# Patient Record
Sex: FEMALE | Race: BLACK OR AFRICAN AMERICAN | Employment: FULL TIME | URBAN - METROPOLITAN AREA
[De-identification: names, ages, dates, MRNs, and addresses within clinical notes are randomized per-mention and may not be internally consistent; named-entity substitution may affect disease eponyms.]

---

## 2018-08-03 ENCOUNTER — OFFICE VISIT (OUTPATIENT)
Dept: CARDIOLOGY CLINIC | Age: 33
End: 2018-08-03

## 2018-08-03 VITALS
BODY MASS INDEX: 43.4 KG/M2 | HEART RATE: 78 BPM | HEIGHT: 69 IN | SYSTOLIC BLOOD PRESSURE: 136 MMHG | WEIGHT: 293 LBS | DIASTOLIC BLOOD PRESSURE: 82 MMHG

## 2018-08-03 DIAGNOSIS — R06.09 DOE (DYSPNEA ON EXERTION): ICD-10-CM

## 2018-08-03 DIAGNOSIS — R00.2 PALPITATIONS: Primary | ICD-10-CM

## 2018-08-03 DIAGNOSIS — E66.9 OBESITY (BMI 30-39.9): ICD-10-CM

## 2018-08-03 DIAGNOSIS — Q24.9 CONGENITAL HEART ANOMALY: ICD-10-CM

## 2018-08-03 PROCEDURE — 99204 OFFICE O/P NEW MOD 45 MIN: CPT | Performed by: INTERNAL MEDICINE

## 2018-08-03 PROCEDURE — 93000 ELECTROCARDIOGRAM COMPLETE: CPT | Performed by: INTERNAL MEDICINE

## 2018-08-03 RX ORDER — LANOLIN ALCOHOL/MO/W.PET/CERES
65 CREAM (GRAM) TOPICAL DAILY
COMMUNITY
Start: 2018-07-08 | End: 2019-10-02 | Stop reason: ALTCHOICE

## 2018-08-03 NOTE — PROGRESS NOTES
[address]   Electrophysiology Consultation   Date: 8/3/2018  Reason for Consultation: atrial fibrillation   Consult Requesting Physician: Brianne Archuleta MD      Chief Complaint   Patient presents with    New Patient    Atrial Fibrillation        HPI: Andrea Starkey is a 35 y.o. female with pmh repaired DORV with LV dysfunction. She underwent interventricular tunnel VSD closure in 1985. 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 She developed atrial tachycardia in 2010 and she underwent successful cardioversion. She is referred today for further evaluation of palpitations. She reports she has intermittent palpitations that make her acutely SOB and dizzy, no arrhythmias have been captured by EKG. She reports the episodes last 5-7 minutes in duration. Patient denies lightheadedness, orthopnea, edema, presyncope or syncope. Past Medical History:   Diagnosis Date    Asthma     Migraines, basilar     Palpitations     VSD (ventricular septal defect and aortic arch hypoplasia         Past Surgical History:   Procedure Laterality Date    VSD REPAIR      baby     Allergies:  No Known Allergies    Social History:   reports that she has never smoked. She has never used smokeless tobacco. She reports that she drinks alcohol. She reports that she does not use drugs. Family History:     Reviewed. Denies family history of sudden cardiac death, arrhythmia, premature CAD    Review of System:  All other systems reviewed and are negative except for that noted above.  Pertinent negatives are:   General: negative for fever, chills   Ophthalmic ROS: negative for - eye pain or loss of vision  ENT ROS: negative for - headaches, sore throat   Respiratory: negative for - cough, sputum  Cardiovascular: Reviewed in HPI  Gastrointestinal: negative for - abdominal pain, diarrhea, N/V  Hematology: negative for - bleeding, blood clots, bruising or and the systolic function is normal.  10. Left ventricle is normal in size and the systolic function is normal.  11. The ascending aorta, transverse arch and descending aorta are unobstructed. 12. There is no significant pericardial effusion. Cath:     Medication:  Current Outpatient Prescriptions   Medication Sig Dispense Refill    ferrous sulfate 325 (65 Fe) MG EC tablet Take 65 mg by mouth daily      aspirin 81 MG tablet Take 81 mg by mouth daily      albuterol (PROVENTIL) (2.5 MG/3ML) 0.083% nebulizer solution Take 2.5 mg by nebulization every 6 hours as needed. No current facility-administered medications for this visit. Assessment and plan:     1. Palpitations  Her episodes last 5-7 minutes duration  She reports episodes symptomatic with SOB  Discussed option of ILR placement vs. alivecor  No EKG showing arrhythmia  She decided to proceed with ILR implantation    2. Congenital Heart disease   Stable    3. dyspnea on exertion     Normal ventricular function  4. Obesity   Diet and exercise        Thank you for allowing me to participate in the care of peng Sick. Further evaluation will be based upon the patient's clinical course and testing results. All questions and concerns were addressed to the patient/family. Alternatives to my treatment were discussed. I have discussed the above stated plan and the patient verbalized understanding and agreed with the plan. Scribe's attestation: This note was scribed in the presence of Janee Claros M.D. by Mati Dennis RN.      Physician Attestation: I, Dr. Gareth Diaz, confirm that the scribe's documentation has been prepared under my direction and personally reviewed by me in its entirety. I also confirm that the note above accurately reflects all work, treatment, procedures, and medical decision making performed by me. NOTE: This report was transcribed using voice recognition software.  Every effort was made to ensure accuracy, however, inadvertent computerized transcription errors may be present.      Chanelle Block MD, Dy[facility] Friend [address] [place] [address]   Office: [phone]

## 2018-08-03 NOTE — PATIENT INSTRUCTIONS
condition or this instruction, always ask your healthcare professional. [place] [address] any warranty or liability for your use of this information.

## 2018-09-06 ENCOUNTER — TELEPHONE (OUTPATIENT)
Dept: CARDIOLOGY CLINIC | Age: 33
End: 2018-09-06

## 2018-09-12 PROBLEM — Z45.09 ENCOUNTER FOR ELECTRONIC ANALYSIS OF REVEAL EVENT RECORDER: Status: ACTIVE | Noted: 2018-09-12

## 2018-09-19 ENCOUNTER — PROCEDURE VISIT (OUTPATIENT)
Dept: CARDIOLOGY CLINIC | Age: 33
End: 2018-09-19

## 2018-09-19 DIAGNOSIS — Z45.09 ENCOUNTER FOR ELECTRONIC ANALYSIS OF REVEAL EVENT RECORDER: Primary | ICD-10-CM

## 2018-09-19 DIAGNOSIS — I48.0 PAF (PAROXYSMAL ATRIAL FIBRILLATION) (HCC): ICD-10-CM

## 2018-09-19 PROCEDURE — 93291 INTERROG DEV EVAL SCRMS IP: CPT | Performed by: INTERNAL MEDICINE

## 2018-10-23 ENCOUNTER — NURSE ONLY (OUTPATIENT)
Dept: CARDIOLOGY CLINIC | Age: 33
End: 2018-10-23
Payer: COMMERCIAL

## 2018-10-23 DIAGNOSIS — I48.0 PAF (PAROXYSMAL ATRIAL FIBRILLATION) (HCC): ICD-10-CM

## 2018-10-23 DIAGNOSIS — Z45.09 ENCOUNTER FOR ELECTRONIC ANALYSIS OF REVEAL EVENT RECORDER: ICD-10-CM

## 2018-10-23 PROCEDURE — 93299 PR REM INTERROG ICPMS/SCRMS <30 D TECH REVIEW: CPT | Performed by: INTERNAL MEDICINE

## 2018-10-23 PROCEDURE — 93298 REM INTERROG DEV EVAL SCRMS: CPT | Performed by: INTERNAL MEDICINE

## 2018-12-04 ENCOUNTER — NURSE ONLY (OUTPATIENT)
Dept: CARDIOLOGY CLINIC | Age: 33
End: 2018-12-04
Payer: COMMERCIAL

## 2018-12-04 DIAGNOSIS — Z45.09 ENCOUNTER FOR ELECTRONIC ANALYSIS OF REVEAL EVENT RECORDER: ICD-10-CM

## 2018-12-04 DIAGNOSIS — I48.0 PAF (PAROXYSMAL ATRIAL FIBRILLATION) (HCC): ICD-10-CM

## 2018-12-04 PROCEDURE — 93299 PR REM INTERROG ICPMS/SCRMS <30 D TECH REVIEW: CPT | Performed by: INTERNAL MEDICINE

## 2018-12-04 PROCEDURE — 93298 REM INTERROG DEV EVAL SCRMS: CPT | Performed by: INTERNAL MEDICINE

## 2019-01-07 PROCEDURE — 93298 REM INTERROG DEV EVAL SCRMS: CPT | Performed by: INTERNAL MEDICINE

## 2019-01-07 PROCEDURE — 93299 PR REM INTERROG ICPMS/SCRMS <30 D TECH REVIEW: CPT | Performed by: INTERNAL MEDICINE

## 2019-01-08 ENCOUNTER — NURSE ONLY (OUTPATIENT)
Dept: CARDIOLOGY CLINIC | Age: 34
End: 2019-01-08
Payer: COMMERCIAL

## 2019-01-08 DIAGNOSIS — Z45.09 ENCOUNTER FOR ELECTRONIC ANALYSIS OF REVEAL EVENT RECORDER: ICD-10-CM

## 2019-01-08 DIAGNOSIS — I48.0 PAF (PAROXYSMAL ATRIAL FIBRILLATION) (HCC): ICD-10-CM

## 2019-02-12 ENCOUNTER — NURSE ONLY (OUTPATIENT)
Dept: CARDIOLOGY CLINIC | Age: 34
End: 2019-02-12
Payer: COMMERCIAL

## 2019-02-12 DIAGNOSIS — Z45.09 ENCOUNTER FOR ELECTRONIC ANALYSIS OF REVEAL EVENT RECORDER: ICD-10-CM

## 2019-02-12 DIAGNOSIS — I48.0 PAF (PAROXYSMAL ATRIAL FIBRILLATION) (HCC): ICD-10-CM

## 2019-02-12 PROCEDURE — 93299 PR REM INTERROG ICPMS/SCRMS <30 D TECH REVIEW: CPT | Performed by: INTERNAL MEDICINE

## 2019-02-12 PROCEDURE — 93298 REM INTERROG DEV EVAL SCRMS: CPT | Performed by: INTERNAL MEDICINE

## 2019-03-26 ENCOUNTER — NURSE ONLY (OUTPATIENT)
Dept: CARDIOLOGY CLINIC | Age: 34
End: 2019-03-26
Payer: COMMERCIAL

## 2019-03-26 DIAGNOSIS — Z45.09 ENCOUNTER FOR ELECTRONIC ANALYSIS OF REVEAL EVENT RECORDER: ICD-10-CM

## 2019-03-26 DIAGNOSIS — I48.0 PAF (PAROXYSMAL ATRIAL FIBRILLATION) (HCC): ICD-10-CM

## 2019-03-26 PROCEDURE — 93298 REM INTERROG DEV EVAL SCRMS: CPT | Performed by: INTERNAL MEDICINE

## 2019-03-26 PROCEDURE — 93299 PR REM INTERROG ICPMS/SCRMS <30 D TECH REVIEW: CPT | Performed by: INTERNAL MEDICINE

## 2019-04-30 ENCOUNTER — NURSE ONLY (OUTPATIENT)
Dept: CARDIOLOGY CLINIC | Age: 34
End: 2019-04-30
Payer: COMMERCIAL

## 2019-04-30 DIAGNOSIS — Z45.09 ENCOUNTER FOR ELECTRONIC ANALYSIS OF REVEAL EVENT RECORDER: ICD-10-CM

## 2019-04-30 DIAGNOSIS — I48.0 PAF (PAROXYSMAL ATRIAL FIBRILLATION) (HCC): ICD-10-CM

## 2019-04-30 PROCEDURE — 93299 PR REM INTERROG ICPMS/SCRMS <30 D TECH REVIEW: CPT | Performed by: INTERNAL MEDICINE

## 2019-04-30 PROCEDURE — 93298 REM INTERROG DEV EVAL SCRMS: CPT | Performed by: INTERNAL MEDICINE

## 2019-04-30 NOTE — PROGRESS NOTES
Carelink/loop recorder transmission shows no AF. Symptom recordings show what appears to be ectopy. Implanted for palps. See PACEART report under Cardiology tab.

## 2019-05-03 ENCOUNTER — TELEPHONE (OUTPATIENT)
Dept: CARDIOLOGY CLINIC | Age: 34
End: 2019-05-03

## 2019-05-03 NOTE — TELEPHONE ENCOUNTER
Hospital nurse Benjamin Cowden asked to notify Dr Rondell Ormond about message in in basket. Notified Amor Sim and she will look at what was sent from loop.

## 2019-05-03 NOTE — TELEPHONE ENCOUNTER
Updated remote interrogation sent via "[employer] to Dr Pili Umanzor for review. No symptom episodes from today have been downloaded. If she is not currently at home, she would need to go home to manually send to get any episodes recorded from this morning so we could obtain for review.  kasey

## 2019-05-03 NOTE — TELEPHONE ENCOUNTER
I spoke with pt . She stated that she is having moderate CP in the middle of her chest that is intermittent. She is having palpitations and weakness. Like she needs to lay down. She stated that she just got a loop recorder and pushed the button. She is at work. What should she do?

## 2019-05-06 ENCOUNTER — TELEPHONE (OUTPATIENT)
Dept: CARDIOLOGY | Age: 34
End: 2019-05-06

## 2019-05-06 NOTE — TELEPHONE ENCOUNTER
Left message to call to see if patient received message. She needs an appt with . withdrawn/eyes open wide

## 2019-05-06 NOTE — TELEPHONE ENCOUNTER
Dr. Lauren Yates called and left a voicemail regarding her symptom. Can you please call and verify that she received the message. No episodes on ILR correlating w/symptom. However she did have a 5 sec pause on April 5th.   She needs to be scene in office to [facility] to discuss this

## 2019-05-13 NOTE — PROGRESS NOTES
[address]   Electrophysiology Follow Up   Date: 5/15/2019    CC:  Palpitations/Chestpain    HPI: Renetta Boone is a 35 y.o. female with pmh repaired DORV with LV dysfunction. She underwent interventricular tunnel VSD closure in 1985. 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 She developed atrial tachycardia in 2010 and she underwent successful cardioversion. She is referred today for further evaluation of palpitations. She reports she has intermittent palpitations that make her acutely SOB and dizzy, no arrhythmias have been captured by EKG. She reports the episodes last 5-7 minutes in duration. Patient denies lightheadedness, orthopnea, edema, presyncope or syncope. She is being seen today for a 5 second pause noted on her ILR interrogation. She presents today for follow up for this reason. She did not report symptoms with this episode. She did feel palpitations and she had an episode approximately 3 seconds of NSVT. She is feeling ok today but does have complaints of palpitations and chest pain. Past Medical History:   Diagnosis Date    Asthma     Migraines, basilar     Palpitations     VSD (ventricular septal defect and aortic arch hypoplasia         Past Surgical History:   Procedure Laterality Date    VSD REPAIR      baby     Allergies:  No Known Allergies    Social History:   reports that she has never smoked. She has never used smokeless tobacco. She reports that she drinks alcohol. She reports that she does not use drugs. Family History:     Reviewed. Denies family history of sudden cardiac death, arrhythmia, premature CAD    Review of System:  All other systems reviewed and are negative except for that noted above.  Pertinent negatives are:   General: negative for fever, chills   Ophthalmic ROS: negative for - eye pain or loss of vision  ENT ROS: negative for - headaches, sore throat   Respiratory: negative for - cough, sputum  Cardiovascular: Reviewed in HPI  Gastrointestinal: negative for - abdominal pain, diarrhea, N/V  Hematology: negative for - bleeding, blood clots, bruising or jaundice  Genito-Urinary:  negative for - Dysuria or incontinence  Musculoskeletal: negative for - Joint swelling, muscle pain  Neurological: negative for - confusion, dizziness, headaches   Psychiatric: No anxiety, no depression. Dermatological: negative for - rash    Physical Examination:  Vitals:    05/15/19 1142   BP: 134/84   Pulse: 67      Wt Readings from Last 3 Encounters:   05/15/19 293 lb 6.4 oz (133.1 kg)   18 (!) 304 lb 1.9 oz (137.9 kg)       · Constitutional: Oriented. No distress. · Head: Normocephalic and atraumatic. · Mouth/Throat: Oropharynx is clear and moist.   · Eyes: Conjunctivae normal. EOM are normal.   · Neck: Neck supple. No rigidity. No JVD present. · Cardiovascular: Normal rate, regular rhythm, S1&S2. · Pulmonary/Chest: Bilateral respiratory sounds. No wheezes, No rhonchi. · Abdominal: Soft. Bowel sounds present. No distension, No tenderness. · Musculoskeletal: No tenderness. No edema    · Lymphadenopathy: Has no cervical adenopathy. · Neurological: Alert and oriented. Cranial nerve appears intact, No Gross deficit   · Skin: Skin is warm and dry. No rash noted. · Psychiatric: Has a normal behavior     Labs, diagnostic and imaging results reviewed. Reviewed. No results found for: TSHREFLEX, TSH, CREATININE, AMIOD, AST, ALT      EC/15/19  Sinus Rhythm RBBB  QTc 440    Echo: 3/28/18    Summary:   1. s/p double outlet right ventricle with doubly committed VSD      -s/p interventricular tunnel VSD closure, 10/17/85      -s/p resection of RV muscle bundles, 8/10/00. 2. Compared to the previous echocardiogram of 2017, there is no significant change. 3. Mild LVOT narrowing secondary to interventricular tunnel repair, peak 23 mmHg, mean 12 mmHg. 4. Trivial pulmonary valve stenosis. 5. Mild tricuspid valve regurgitation. 6. No aortic valve regurgitation. 7. Mild right ventricular hypertension (42 mm Hg if CVP assumed to be 10 mm Hg, SBP of 124 mm Hg). 8. The pulmonary arteries are of good size. 9. Right ventricle is mildly dilated and the systolic function is normal.  10. Left ventricle is normal in size and the systolic function is normal.  11. The ascending aorta, transverse arch and descending aorta are unobstructed. 12. There is no significant pericardial effusion. Cath:     Medication:  Current Outpatient Medications   Medication Sig Dispense Refill    ferrous sulfate 325 (65 Fe) MG EC tablet Take 65 mg by mouth daily      aspirin 81 MG tablet Take 81 mg by mouth daily      albuterol (PROVENTIL) (2.5 MG/3ML) 0.083% nebulizer solution Take 2.5 mg by nebulization every 6 hours as needed. No current facility-administered medications for this visit. Assessment and plan:     Palpitations  Her episodes last 5-7 minutes duration  She reports episodes symptomatic with SOB  At least one episode of NSVT      S/p ILR implantation 9/12/18  The CIED was interrogated and programmed and I supervised and reviewed all the data. All findings and changes are in device interrogation sheat and reflect my personal interpretation and changes and is scanned to Epic.    5 second AV block noted on her ILR interrogation. 3 second episode of NSVT  The risks, benefits and alternatives of the procedure were discussed with the patient. The risks including, but not limited to, the risks of bleeding, infection, pain, device malfunction, lead dislodgement, radiation exposure, injury to cardiac and surrounding structures (including pneumothorax), stroke, cardiac perforation, tamponade, need for emergent heart surgery, myocardial infarction and death were discussed in detail. Dual vs single chamber device, including risks and benefits were discussed with patient.        NSVT on device interrogation   Discussion of EPS to determine if inducible and if inducible we will implant a     High grade AV block   One episode during awake hours. Can be due to AV node disease due to past surgery. More likely paroxysmal and therefore unpredictable if it can occur again and how severe. Congenital Heart disease   Stable    dyspnea on exertion   Normal ventricular function    Obesity   Body mass index is 43.33 kg/m². Diet and exercise discussed    Plan:   I think both NSVT and AV block are related to surgical scar. She will likely benefit from a Permanent pacemaker due to high risk of recurrence. I think we can do an electrophysiological study(EPS) and assess conduction and more importantly inducibility of VT and if positive, to proceed with ICD. Will start with electrophysiological study(EPS) and then decide about Permanent pacemaker (?defib RV lead?) vs ICD    - The patient is counseled to follow a low salt diet to assure blood pressure remains controlled for cardiovascular risk factor modification.   - The patient is counseled to avoid excess caffeine, and energy drinks as this may exacerbated ectopy and arrhythmia. - The patient is counseled to get regular exercise 3-5 times per week to control cardiovascular risk factors. - The patient is counseled to lose weigt to control cardiovascular risk factors. Thank you for allowing me to participate in the care of Debi Hightower. Further evaluation will be based upon the patient's clinical course and testing results. All questions and concerns were addressed to the patient/family. Alternatives to my treatment were discussed. I have discussed the above stated plan and the patient verbalized understanding and agreed with the plan. NOTE: This report was transcribed using voice recognition software. Every effort was made to ensure accuracy, however, inadvertent computerized transcription errors may be present.      Tenzin Shepard MD, [facility], [address] [facility]   Office: [phone]       Scribe attestation:  This note was scribed in the presence of Ashish Bowers MD by Edwina Bell RN    I, Dr. Ashish Bowers personally performed the services described in this documentation as scribed by RN in my presence, and it is both accurate and complete.

## 2019-05-15 ENCOUNTER — PROCEDURE VISIT (OUTPATIENT)
Dept: CARDIOLOGY CLINIC | Age: 34
End: 2019-05-15
Payer: COMMERCIAL

## 2019-05-15 ENCOUNTER — OFFICE VISIT (OUTPATIENT)
Dept: CARDIOLOGY CLINIC | Age: 34
End: 2019-05-15
Payer: COMMERCIAL

## 2019-05-15 VITALS
HEART RATE: 67 BPM | SYSTOLIC BLOOD PRESSURE: 134 MMHG | BODY MASS INDEX: 43.4 KG/M2 | WEIGHT: 293 LBS | HEIGHT: 69 IN | DIASTOLIC BLOOD PRESSURE: 84 MMHG

## 2019-05-15 DIAGNOSIS — Z45.09 ENCOUNTER FOR ELECTRONIC ANALYSIS OF REVEAL EVENT RECORDER: ICD-10-CM

## 2019-05-15 DIAGNOSIS — I47.29 NSVT (NONSUSTAINED VENTRICULAR TACHYCARDIA): Primary | ICD-10-CM

## 2019-05-15 DIAGNOSIS — Q24.9 CONGENITAL HEART ANOMALY: ICD-10-CM

## 2019-05-15 DIAGNOSIS — I44.1 AV BLOCK, 2ND DEGREE: ICD-10-CM

## 2019-05-15 DIAGNOSIS — I48.0 PAF (PAROXYSMAL ATRIAL FIBRILLATION) (HCC): ICD-10-CM

## 2019-05-15 PROCEDURE — 99215 OFFICE O/P EST HI 40 MIN: CPT | Performed by: INTERNAL MEDICINE

## 2019-05-15 PROCEDURE — 93000 ELECTROCARDIOGRAM COMPLETE: CPT | Performed by: INTERNAL MEDICINE

## 2019-05-15 PROCEDURE — 93291 INTERROG DEV EVAL SCRMS IP: CPT | Performed by: INTERNAL MEDICINE

## 2019-05-15 NOTE — LETTER
[facility]  EP Procedure Sheet  Dell Ortiz  1985  EP Procedures     Pacemaker implant (single/dual) xxx EP Study    ICD implant (single/dual)  Atrial flutter ablation (MEJIA Y/N)    Biv implant ICD  Cryoablation    Biv implant PPM  Atrial fibrillation ablation (MEJIA Yes)    Generator Change (PPM/ICD/BiV)  SVT ablation    Lead revision (RV/LA/RA) (<1 month)  VT ablation      Lead extraction +/- upgrade (BiV/PPM/ICD)  VT Ischemic/ non-ischemic    Loop implant/ removal  VT RVOT    Cardioversion  VT Left sided    MEJIA  AVN ablation     Equipment     [employer]onic   JULIA Mapping System    [place]  [id number] [address] [employer]  CryoAblation    [employer]  Laser Lead Extraction    Special Equipment       EP Procedures Scheduling Request    # hours Requested     Specific Day    Anesthesia    CT surgery backup    Location [facility]     Pre-Procedure Labs / Imaging     PT/INR  Type & cross    CBC  Units PRBC    BMP/Mg  Units FFP    Venogram  CXR    Echo  Cardiac CTA for Pulmonary vein mapping     RN INITIALS:RH  Patient Instructions  Do not eat or drink after midnight the night prior to procedure  Dx:NSVT/Pauses

## 2019-05-15 NOTE — PROGRESS NOTES
Patient comes in for interrogation of her implanted loop recorder. Interrogation shows 1 pause in April and NSVT on May the 3rd. She will see Dr. Isabella Gonzalez today.

## 2019-06-13 ENCOUNTER — TELEPHONE (OUTPATIENT)
Dept: CARDIOLOGY CLINIC | Age: 34
End: 2019-06-13

## 2019-06-13 NOTE — TELEPHONE ENCOUNTER
----- Message from Zonia Cuellar MA sent at 6/13/2019 10:01 AM EDT -----      ----- Message -----  From: Channing Moritz  Sent: 6/13/2019   8:50 AM  To: Zonia Cuellar MA    Mother (Rhett Peterson) upset, states pt should have an appt. Soon with [facility]. Her daughter is having heart difficulties and requesting to speak directly to [facility]. States if pt can't be seen promptly she will have to go to another doctor. Please call Rhett Peterson at [phone].  Lonnie Adrian

## 2019-06-13 NOTE — TELEPHONE ENCOUNTER
----- Message from Glendy Barreto MA sent at 6/13/2019 10:01 AM EDT -----      ----- Message -----  From: Stephanie Fowler  Sent: 6/13/2019   8:50 AM  To: Glendy Barreto MA    Mother (Rosalino Shelleyradha) upset, states pt should have an appt. Soon with [facility]. Her daughter is having heart difficulties and requesting to speak directly to [facility]. States if pt can't be seen promptly she will have to go to another doctor. Please call Rosalino Presley at [phone].  Enrigue Schlatter

## 2019-06-14 ENCOUNTER — TELEPHONE (OUTPATIENT)
Dept: CARDIOLOGY CLINIC | Age: 34
End: 2019-06-14

## 2019-06-14 NOTE — TELEPHONE ENCOUNTER
Ms. Mariana Villegas called office c/o frequent bursts of palps since yesterday; her phone number is [phone]. She feels very tired with some cp and sob. She has been in bed most of the last 24hrs. She recently saw Dr. Chirag Whitaker in office. She has svt ablation scheduled 7/19/19. I texted him this message. GEN: Well appearing, in no apparent distress. Obese, sitting upright in chair.    HEAD:  Normocephalic, atraumatic.    EYES:  Clear conjunctivae without injection, drainage or discharge.    ENMT:  Nasal mucosa moist; mouth moist without ulcerations or lesions; throat moist without erythema, exudate, ulcerations or lesions.    NECK:  Supple, no masses. Normal ROM.    CARDIAC:  RRR, normal S1 and S2, no murmurs, rubs or gallops.    RESP:  Respiratory rate and effort appear normal; lungs are clear to auscultation bilaterally; no rhonchi, rales or wheezes.    ABDOMEN:  Soft, non-tender, non-distended, no masses. Normal BS throughout.    MUSCULOSKELETAL: (+) B/L leg swelling, no calf tenderness. Patient able to ambulate but difficulty at baseline 2/2 obesity.  NEURO:  AAO x 3.     SKIN:  Normal skin color for age and race, well-perfused; warm and dry.

## 2019-06-14 NOTE — TELEPHONE ENCOUNTER
Patient complains of some shortness of breath and chest pain. Her ILR looked grossly normal from today will forward to [facility].  I told the patient if she was feeling that bad she needed to be evaluated in the ED

## 2019-07-05 ENCOUNTER — TELEPHONE (OUTPATIENT)
Dept: CARDIOLOGY CLINIC | Age: 34
End: 2019-07-05

## 2019-07-22 ENCOUNTER — TELEPHONE (OUTPATIENT)
Dept: CARDIOLOGY CLINIC | Age: 34
End: 2019-07-22

## 2019-07-24 ENCOUNTER — NURSE ONLY (OUTPATIENT)
Dept: CARDIOLOGY CLINIC | Age: 34
End: 2019-07-24
Payer: COMMERCIAL

## 2019-07-24 ENCOUNTER — OFFICE VISIT (OUTPATIENT)
Dept: CARDIOLOGY CLINIC | Age: 34
End: 2019-07-24
Payer: COMMERCIAL

## 2019-07-24 VITALS
SYSTOLIC BLOOD PRESSURE: 100 MMHG | WEIGHT: 284 LBS | HEART RATE: 80 BPM | HEIGHT: 69 IN | BODY MASS INDEX: 42.06 KG/M2 | DIASTOLIC BLOOD PRESSURE: 68 MMHG

## 2019-07-24 DIAGNOSIS — Q24.9 CONGENITAL HEART ANOMALY: ICD-10-CM

## 2019-07-24 DIAGNOSIS — Z45.09 ENCOUNTER FOR ELECTRONIC ANALYSIS OF REVEAL EVENT RECORDER: ICD-10-CM

## 2019-07-24 DIAGNOSIS — I44.2 AV BLOCK, COMPLETE (HCC): Primary | ICD-10-CM

## 2019-07-24 DIAGNOSIS — I48.0 PAF (PAROXYSMAL ATRIAL FIBRILLATION) (HCC): ICD-10-CM

## 2019-07-24 DIAGNOSIS — E66.9 OBESITY (BMI 30-39.9): ICD-10-CM

## 2019-07-24 DIAGNOSIS — R00.2 PALPITATION: ICD-10-CM

## 2019-07-24 DIAGNOSIS — R55 SYNCOPE, UNSPECIFIED SYNCOPE TYPE: ICD-10-CM

## 2019-07-24 DIAGNOSIS — I47.29 NSVT (NONSUSTAINED VENTRICULAR TACHYCARDIA): ICD-10-CM

## 2019-07-24 PROCEDURE — 99215 OFFICE O/P EST HI 40 MIN: CPT | Performed by: INTERNAL MEDICINE

## 2019-07-24 PROCEDURE — 93291 INTERROG DEV EVAL SCRMS IP: CPT | Performed by: INTERNAL MEDICINE

## 2019-07-24 RX ORDER — PYRIDOXINE HCL (VITAMIN B6) 25 MG
25 TABLET ORAL 3 TIMES DAILY
COMMUNITY
End: 2019-10-02

## 2019-07-24 NOTE — PROGRESS NOTES
pregnancy. Palpitations  Her episodes last 5-7 minutes duration  She reports episodes symptomatic with SOB  At least one episode of NSVT  No correlation with rhythm      S/p ILR implantation 9/12/18  The CIED was interrogated and programmed and I supervised and reviewed all the data. All findings and changes are in device interrogation sheat and reflect my personal interpretation and changes and is scanned to Epic.    5 second AV block noted on her ILR interrogation. X2    3 second episode of NSVT, no recurrence   The risks, benefits and alternatives of the procedure were discussed with the patient. The risks including, but not limited to, the risks of bleeding, infection, pain, device malfunction, lead dislodgement, radiation exposure, injury to cardiac and surrounding structures (including pneumothorax), stroke, cardiac perforation, tamponade, need for emergent heart surgery, myocardial infarction and death were discussed in detail. Dual vs single chamber device, including risks and benefits were discussed with patient. Given being pregnant, the additional risk to fetus and her were discussed  Will use shielding       NSVT    No VT noted on today's device interrogation      High grade AV block   2 episodes during awake hours, one with near syncope. Can be due to AV node disease due to past surgery. More likely paroxysmal and therefore unpredictable if it can occur again and how severe. Needs Permanent pacemaker     Congenital Heart disease   Stable. [facility] at [facility]    dyspnea on exertion   Normal ventricular function    Obesity   Body mass index is 41.94 kg/m². Diet and exercise discussed    Plan:   Schedule PPM        - The patient is counseled to follow a low salt diet to assure blood pressure remains controlled for cardiovascular risk factor modification.   - The patient is counseled to avoid excess caffeine, and energy drinks as this may exacerbated ectopy and arrhythmia.    - The patient is counseled to get regular exercise 3-5 times per week to control cardiovascular risk factors. - The patient is counseled to lose weigt to control cardiovascular risk factors. Thank you for allowing me to participate in the care of Sugey Khan. Further evaluation will be based upon the patient's clinical course and testing results. All questions and concerns were addressed to the patient/family. Alternatives to my treatment were discussed. I have discussed the above stated plan and the patient verbalized understanding and agreed with the plan. NOTE: This report was transcribed using voice recognition software. Every effort was made to ensure accuracy, however, inadvertent computerized transcription errors may be present. Boubacar Cortez MD, [facility] [address]   Office: [phone]     Scribe attestation:  This note was scribed in the presence of Boubacar Cortez MD by Zenaida Velázquez, ESPERANZA    I, Dr. Boubacar Cortez personally performed the services described in this documentation as scribed by RN in my presence, and it is both accurate and complete.

## 2019-07-24 NOTE — LETTER
[facility]  EP Procedure Sheet    Libertad Kelsey  1985    EP Procedures    xxx Pacemaker implant (single)  EP Study    ICD implant (single/dual)  Atrial flutter ablation (MEJIA Y/N)    Biv implant ICD  Cryoablation    Biv implant PPM  Atrial fibrillation ablation (MEJIA Yes)    Generator Change (PPM/ICD/BiV)  SVT ablation    Lead revision (RV/LA/RA) (<1 month)  VT ablation      Lead extraction +/- upgrade (BiV/PPM/ICD)  VT Ischemic/ non-ischemic    Loop implant/ removal  VT RVOT    Cardioversion  VT Left sided    MEJIA  AVN ablation     Equipment    xxx Medtronic   JULIA Mapping System    [employer]  [employer] Mapping System    [employer]  CryoAblation    [employer]  Laser Lead Extraction     EP Procedures Scheduling Request    # hours Requested     Specific Day Next week   Anesthesia    CT surgery backup    Overnight stay    Location [facility]     Pre-Procedure Labs / Imaging     PT/INR  Type & cross    CBC  Units PRBC    BMP/Mg  Units FFP    Venogram  CXR    Echo  Cardiac CTA for Pulmonary vein mapping     RN INITIALS:RH     Patient Instructions  Do not eat or drink after midnight the night prior to procedure  Dx:Syncope/Pause

## 2019-07-31 ENCOUNTER — APPOINTMENT (OUTPATIENT)
Dept: ULTRASOUND IMAGING | Age: 34
End: 2019-07-31
Attending: INTERNAL MEDICINE
Payer: COMMERCIAL

## 2019-07-31 ENCOUNTER — HOSPITAL ENCOUNTER (OUTPATIENT)
Dept: CARDIAC CATH/INVASIVE PROCEDURES | Age: 34
Discharge: HOME | End: 2019-08-01
Attending: INTERNAL MEDICINE | Admitting: INTERNAL MEDICINE
Payer: COMMERCIAL

## 2019-07-31 PROBLEM — I44.2 CHB (COMPLETE HEART BLOCK) (HCC): Status: ACTIVE | Noted: 2019-07-31

## 2019-07-31 LAB
GFR AFRICAN AMERICAN: >60
GFR NON-AFRICAN AMERICAN: >60
GLUCOSE BLD-MCNC: 92 MG/DL (ref 70–99)
HEMOGLOBIN: 11.1 G/DL (ref 12–16)
PERFORMED ON: ABNORMAL
PLATELET # BLD: 281 K/UL (ref 135–450)
POC BUN: 5 MG/DL (ref 7–18)
POC CREATININE: 0.7 MG/DL (ref 0.6–1.1)
POC HEMATOCRIT: 32 % (ref 36–48)
POC POTASSIUM: 3.6 MMOL/L (ref 3.5–5.1)
POC SAMPLE TYPE: ABNORMAL
POC SODIUM: 138 MMOL/L (ref 136–145)
WBC # BLD: 4.2 K/UL (ref 4–11)

## 2019-07-31 PROCEDURE — 85018 HEMOGLOBIN: CPT

## 2019-07-31 PROCEDURE — 84132 ASSAY OF SERUM POTASSIUM: CPT

## 2019-07-31 PROCEDURE — 85048 AUTOMATED LEUKOCYTE COUNT: CPT

## 2019-07-31 PROCEDURE — 2580000003 HC RX 258: Performed by: INTERNAL MEDICINE

## 2019-07-31 PROCEDURE — 6370000000 HC RX 637 (ALT 250 FOR IP): Performed by: INTERNAL MEDICINE

## 2019-07-31 PROCEDURE — 99152 MOD SED SAME PHYS/QHP 5/>YRS: CPT

## 2019-07-31 PROCEDURE — 2500000003 HC RX 250 WO HCPCS

## 2019-07-31 PROCEDURE — 82947 ASSAY GLUCOSE BLOOD QUANT: CPT

## 2019-07-31 PROCEDURE — 84295 ASSAY OF SERUM SODIUM: CPT

## 2019-07-31 PROCEDURE — 85014 HEMATOCRIT: CPT

## 2019-07-31 PROCEDURE — 36415 COLL VENOUS BLD VENIPUNCTURE: CPT

## 2019-07-31 PROCEDURE — 33208 INSRT HEART PM ATRIAL & VENT: CPT | Performed by: INTERNAL MEDICINE

## 2019-07-31 PROCEDURE — 76817 TRANSVAGINAL US OBSTETRIC: CPT

## 2019-07-31 PROCEDURE — C1898 LEAD, PMKR, OTHER THAN TRANS: HCPCS

## 2019-07-31 PROCEDURE — 99153 MOD SED SAME PHYS/QHP EA: CPT

## 2019-07-31 PROCEDURE — C1785 PMKR, DUAL, RATE-RESP: HCPCS

## 2019-07-31 PROCEDURE — C1730 CATH, EP, 19 OR FEW ELECT: HCPCS

## 2019-07-31 PROCEDURE — 33208 INSRT HEART PM ATRIAL & VENT: CPT

## 2019-07-31 PROCEDURE — 85049 AUTOMATED PLATELET COUNT: CPT

## 2019-07-31 PROCEDURE — 6360000002 HC RX W HCPCS

## 2019-07-31 PROCEDURE — C1894 INTRO/SHEATH, NON-LASER: HCPCS

## 2019-07-31 PROCEDURE — 82565 ASSAY OF CREATININE: CPT

## 2019-07-31 PROCEDURE — 6360000002 HC RX W HCPCS: Performed by: INTERNAL MEDICINE

## 2019-07-31 PROCEDURE — 84520 ASSAY OF UREA NITROGEN: CPT

## 2019-07-31 RX ORDER — FERROUS SULFATE 325(65) MG
325 TABLET ORAL DAILY
Status: DISCONTINUED | OUTPATIENT
Start: 2019-07-31 | End: 2019-08-01 | Stop reason: HOSPADM

## 2019-07-31 RX ORDER — ACETAMINOPHEN 325 MG/1
650 TABLET ORAL EVERY 4 HOURS PRN
Status: DISCONTINUED | OUTPATIENT
Start: 2019-07-31 | End: 2019-08-01 | Stop reason: HOSPADM

## 2019-07-31 RX ORDER — CEFAZOLIN SODIUM 2 G/100ML
2 INJECTION, SOLUTION INTRAVENOUS EVERY 8 HOURS
Status: COMPLETED | OUTPATIENT
Start: 2019-07-31 | End: 2019-08-01

## 2019-07-31 RX ORDER — ALBUTEROL SULFATE 2.5 MG/3ML
2.5 SOLUTION RESPIRATORY (INHALATION) EVERY 6 HOURS PRN
Status: DISCONTINUED | OUTPATIENT
Start: 2019-07-31 | End: 2019-08-01 | Stop reason: HOSPADM

## 2019-07-31 RX ORDER — SODIUM CHLORIDE 0.9 % (FLUSH) 0.9 %
10 SYRINGE (ML) INJECTION PRN
Status: DISCONTINUED | OUTPATIENT
Start: 2019-07-31 | End: 2019-08-01 | Stop reason: HOSPADM

## 2019-07-31 RX ORDER — SODIUM CHLORIDE 0.9 % (FLUSH) 0.9 %
10 SYRINGE (ML) INJECTION EVERY 12 HOURS SCHEDULED
Status: DISCONTINUED | OUTPATIENT
Start: 2019-07-31 | End: 2019-08-01 | Stop reason: HOSPADM

## 2019-07-31 RX ORDER — PNV,CALCIUM 72/IRON/FOLIC ACID 27 MG-1 MG
1 TABLET ORAL DAILY
Status: DISCONTINUED | OUTPATIENT
Start: 2019-07-31 | End: 2019-08-01 | Stop reason: HOSPADM

## 2019-07-31 RX ORDER — LANOLIN ALCOHOL/MO/W.PET/CERES
25 CREAM (GRAM) TOPICAL 3 TIMES DAILY
Status: DISCONTINUED | OUTPATIENT
Start: 2019-07-31 | End: 2019-08-01 | Stop reason: HOSPADM

## 2019-07-31 RX ORDER — ASPIRIN 81 MG/1
81 TABLET, CHEWABLE ORAL DAILY
Status: DISCONTINUED | OUTPATIENT
Start: 2019-07-31 | End: 2019-08-01 | Stop reason: HOSPADM

## 2019-07-31 RX ADMIN — Medication 10 ML: at 21:19

## 2019-07-31 RX ADMIN — ACETAMINOPHEN 650 MG: 325 TABLET, FILM COATED ORAL at 21:52

## 2019-07-31 RX ADMIN — Medication 25 MG: at 14:05

## 2019-07-31 RX ADMIN — Medication 25 MG: at 21:19

## 2019-07-31 RX ADMIN — CEFAZOLIN SODIUM 2 G: 2 INJECTION, SOLUTION INTRAVENOUS at 18:04

## 2019-07-31 RX ADMIN — ASPIRIN 81 MG 81 MG: 81 TABLET ORAL at 16:08

## 2019-07-31 RX ADMIN — ACETAMINOPHEN 650 MG: 325 TABLET, FILM COATED ORAL at 16:08

## 2019-07-31 ASSESSMENT — PAIN SCALES - GENERAL
PAINLEVEL_OUTOF10: 0
PAINLEVEL_OUTOF10: 6
PAINLEVEL_OUTOF10: 8
PAINLEVEL_OUTOF10: 6

## 2019-07-31 NOTE — H&P
[facility]   Electrophysiology       CC:  Syncope with pause    HPI: Sugey Khan is a 35 y.o. female with pmh repaired DORV with LV dysfunction. She underwent interventricular tunnel VSD closure in 1985. 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 She developed atrial tachycardia in 2010 and she underwent successful cardioversion. She is referred today for further evaluation of palpitations. She reports she has intermittent palpitations that make her acutely SOB and dizzy, no arrhythmias have been captured by EKG. She reports the episodes last 5-7 minutes in duration. Today Jean Claude Baldwin presents for a visit to discuss episode of syncope related to a pause that was recorded on her ILR. She is 8 weeks pregnant today. We discussed the necessity of a pacemaker as before to protect restriction of blood flow for her and her baby. Past Medical History:   Diagnosis Date    Asthma     Migraines, basilar     Palpitations     VSD (ventricular septal defect and aortic arch hypoplasia         Past Surgical History:   Procedure Laterality Date    VSD REPAIR      baby     Allergies:  No Known Allergies    Social History:   reports that she has never smoked. She has never used smokeless tobacco. She reports that she drinks alcohol. She reports that she does not use drugs. Family History:     Reviewed. Denies family history of sudden cardiac death, arrhythmia, premature CAD    Review of System:  All other systems reviewed and are negative except for that noted above.  Pertinent negatives are:   General: negative for fever, chills   Ophthalmic ROS: negative for - eye pain or loss of vision  ENT ROS: negative for - headaches, sore throat   Respiratory: negative for - cough, sputum  Cardiovascular: Reviewed in HPI  Gastrointestinal: negative for - abdominal pain, diarrhea, N/V  Hematology: negative for - bleeding, blood clots, bruising or ascending aorta, transverse arch and descending aorta are unobstructed. 8. There is no significant pericardial effusion. 9. Compared to the previous echocardiogram of 3/28/2018, there is no longer RV hypertension. Echo: 3/28/18    Summary:   1. s/p double outlet right ventricle with doubly committed VSD      -s/p interventricular tunnel VSD closure, 10/17/85      -s/p resection of RV muscle bundles, 8/10/00. 2. Compared to the previous echocardiogram of 2/23/2017, there is no significant change. 3. Mild LVOT narrowing secondary to interventricular tunnel repair, peak 23 mmHg, mean 12 mmHg. 4. Trivial pulmonary valve stenosis. 5. Mild tricuspid valve regurgitation. 6. No aortic valve regurgitation. 7. Mild right ventricular hypertension (42 mm Hg if CVP assumed to be 10 mm Hg, SBP of 124 mm Hg). 8. The pulmonary arteries are of good size. 9. Right ventricle is mildly dilated and the systolic function is normal.  10. Left ventricle is normal in size and the systolic function is normal.  11. The ascending aorta, transverse arch and descending aorta are unobstructed. 12. There is no significant pericardial effusion. Cath:     Medication:  Current Outpatient Medications   Medication Sig Dispense Refill    Prenatal Vit-Fe Fumarate-FA (PREPLUS PO) Take 1 tablet by mouth daily      pyridoxine (B-6) 25 MG tablet Take 25 mg by mouth 3 times daily      ferrous sulfate 325 (65 Fe) MG EC tablet Take 65 mg by mouth daily      aspirin 81 MG tablet Take 81 mg by mouth daily      albuterol (PROVENTIL) (2.5 MG/3ML) 0.083% nebulizer solution Take 2.5 mg by nebulization every 6 hours as needed. No current facility-administered medications for this visit. Assessment and plan:   Syncope/Pause   D/t syncopal episode related to AV block we discussed need for pacemaker. All risks and benefits discussed with patient and family regarding risks with pregnancy.      Palpitations  Her episodes last 5-7

## 2019-08-01 VITALS
WEIGHT: 286.82 LBS | RESPIRATION RATE: 16 BRPM | HEART RATE: 81 BPM | OXYGEN SATURATION: 100 % | BODY MASS INDEX: 42.48 KG/M2 | DIASTOLIC BLOOD PRESSURE: 71 MMHG | SYSTOLIC BLOOD PRESSURE: 115 MMHG | TEMPERATURE: 98.1 F | HEIGHT: 69 IN

## 2019-08-01 PROCEDURE — 2580000003 HC RX 258: Performed by: INTERNAL MEDICINE

## 2019-08-01 PROCEDURE — 6370000000 HC RX 637 (ALT 250 FOR IP): Performed by: INTERNAL MEDICINE

## 2019-08-01 PROCEDURE — 93279 PRGRMG DEV EVAL PM/LDLS PM: CPT | Performed by: INTERNAL MEDICINE

## 2019-08-01 PROCEDURE — 6360000002 HC RX W HCPCS: Performed by: INTERNAL MEDICINE

## 2019-08-01 RX ADMIN — Medication 25 MG: at 08:57

## 2019-08-01 RX ADMIN — Medication 10 ML: at 08:57

## 2019-08-01 RX ADMIN — ASPIRIN 81 MG 81 MG: 81 TABLET ORAL at 08:56

## 2019-08-01 RX ADMIN — Medication 27 MG: at 08:56

## 2019-08-01 RX ADMIN — ACETAMINOPHEN 650 MG: 325 TABLET, FILM COATED ORAL at 08:56

## 2019-08-01 RX ADMIN — FERROUS SULFATE TAB 325 MG (65 MG ELEMENTAL FE) 325 MG: 325 (65 FE) TAB at 08:56

## 2019-08-01 RX ADMIN — CEFAZOLIN SODIUM 2 G: 2 INJECTION, SOLUTION INTRAVENOUS at 03:05

## 2019-08-01 ASSESSMENT — PAIN DESCRIPTION - PAIN TYPE: TYPE: ACUTE PAIN

## 2019-08-01 ASSESSMENT — PAIN DESCRIPTION - LOCATION: LOCATION: ARM

## 2019-08-01 ASSESSMENT — PAIN SCALES - GENERAL: PAINLEVEL_OUTOF10: 5

## 2019-08-01 ASSESSMENT — PAIN DESCRIPTION - DESCRIPTORS: DESCRIPTORS: DISCOMFORT

## 2019-08-01 ASSESSMENT — PAIN DESCRIPTION - ORIENTATION: ORIENTATION: LEFT

## 2019-08-01 NOTE — PROGRESS NOTES
Data- discharge order received, pt verbalized agreement to discharge, disposition to previous residence, no needs for HHC/DME. Action- discharge instructions prepared and given to pt and family, pt verbalized understanding. Medication information packet given r/t NEW and/or CHANGED prescriptions emphasizing name/purpose/side effects, pt verbalized understanding. Discharge instruction summary: Diet- general, no caffine, Activity- as tolerated, Primary Care Physician as follows: Delvin Weaver -[phone] f/u appointment made for pt within a week, prescription medications filled in pt preferred pharmacy. Response- Pt belongings gathered, IV removed. Disposition is home (no HHC/DME needs), transported with belongings, taken to lobby via w/c w/ family, no complications.

## 2019-08-01 NOTE — DISCHARGE SUMMARY
[address] [place] [address] SUMMARY      Patient ID:  Kranthi Lizama  [id number] 29 y.o. 1985    Admit date: 7/31/2019    Discharge date:      Admitting Physician: Shari Weldon MD     Discharge Physician: Shari Weldon     Admission Diagnoses: Paroxysmal atrial fibrillation [I48.0]  CHB (complete heart block) ([facility] [I44.2]    Discharge Diagnoses:   Patient Active Problem List   Diagnosis    PAF (paroxysmal atrial fibrillation) ([facility]    Encounter for electronic analysis of reveal event recorder    CHB (complete heart block) [facility])        Discharged Condition: good    Hospital Course: Kranthi Lizama was admitted for Permanent pacemaker implant. No complications    Consults: none    Significant Diagnostic Studies:   Echocardiography: not done  Stress test:   Labs:   Lab Results   Component Value Date    CREATININE 0.7 07/31/2019      Lab Results   Component Value Date    WBC 4.2 07/31/2019    HGB 11.1 (L) 07/31/2019     07/31/2019    No results found for: INR, PROTIME No results found for: BNP      Disposition: home    Patient Instructions:    Danny Keyes   Home Medication Instructions VOB:[id number]    Printed on:08/01/19 0955   Medication Information                      albuterol (PROVENTIL) (2.5 MG/3ML) 0.083% nebulizer solution  Take 2.5 mg by nebulization every 6 hours as needed.              aspirin 81 MG tablet  Take 81 mg by mouth daily             ferrous sulfate 325 (65 Fe) MG EC tablet  Take 65 mg by mouth daily             Prenatal Vit-Fe Fumarate-FA (PREPLUS PO)  Take 1 tablet by mouth daily             pyridoxine (B-6) 25 MG tablet  Take 25 mg by mouth 3 times daily               Current Discharge Medication List      CONTINUE these medications which have NOT CHANGED    Details   Prenatal Vit-Fe Fumarate-FA (PREPLUS PO) Take 1 tablet by mouth daily      pyridoxine (B-6) 25 MG tablet Take 25 mg by mouth 3 times daily      ferrous sulfate 325 (65 Fe) MG EC tablet Take 65 mg

## 2019-08-07 ENCOUNTER — NURSE ONLY (OUTPATIENT)
Dept: CARDIOLOGY CLINIC | Age: 34
End: 2019-08-07
Payer: COMMERCIAL

## 2019-08-07 DIAGNOSIS — I48.0 PAF (PAROXYSMAL ATRIAL FIBRILLATION) (HCC): ICD-10-CM

## 2019-08-07 DIAGNOSIS — Z95.0 PACEMAKER: Primary | ICD-10-CM

## 2019-08-07 DIAGNOSIS — I44.2 CHB (COMPLETE HEART BLOCK) (HCC): ICD-10-CM

## 2019-08-07 PROCEDURE — 93280 PM DEVICE PROGR EVAL DUAL: CPT | Performed by: INTERNAL MEDICINE

## 2019-08-09 ENCOUNTER — TELEPHONE (OUTPATIENT)
Dept: CARDIOLOGY CLINIC | Age: 34
End: 2019-08-09

## 2019-08-09 NOTE — TELEPHONE ENCOUNTER
LT ankle swelling, SOB, CP - sharp in between her heart and her device. She states that she is staying with chuck Leon while recovering and she cant run the check till later today- about 1:00. I advised her to go to the hospital with worsening s/s. She verbalized understanding.

## 2019-08-16 ENCOUNTER — TELEPHONE (OUTPATIENT)
Dept: CARDIOLOGY CLINIC | Age: 34
End: 2019-08-16

## 2019-08-27 ENCOUNTER — TELEPHONE (OUTPATIENT)
Dept: CARDIOLOGY CLINIC | Age: 34
End: 2019-08-27

## 2019-08-27 NOTE — TELEPHONE ENCOUNTER
[employer] had pacemaker implanted a few weeks ago. She tried to send a transmission and it failed, she called the company and they told her she needed to call here because the device isn't registered yet. Please call her to advise when this will be done and when she can start transmitting. Thank you.

## 2019-08-28 ENCOUNTER — APPOINTMENT (OUTPATIENT)
Dept: GENERAL RADIOLOGY | Age: 34
End: 2019-08-28
Payer: COMMERCIAL

## 2019-08-28 ENCOUNTER — HOSPITAL ENCOUNTER (OUTPATIENT)
Age: 34
Setting detail: OBSERVATION
Discharge: HOME | End: 2019-08-31
Attending: EMERGENCY MEDICINE | Admitting: HOSPITALIST
Payer: COMMERCIAL

## 2019-08-28 DIAGNOSIS — R06.00 DYSPNEA AND RESPIRATORY ABNORMALITIES: Primary | ICD-10-CM

## 2019-08-28 DIAGNOSIS — R06.89 DYSPNEA AND RESPIRATORY ABNORMALITIES: Primary | ICD-10-CM

## 2019-08-28 DIAGNOSIS — R07.9 CHEST PAIN, UNSPECIFIED TYPE: ICD-10-CM

## 2019-08-28 LAB
A/G RATIO: 1.1 (ref 1.1–2.2)
ALBUMIN SERPL-MCNC: 3.7 G/DL (ref 3.4–5)
ALP BLD-CCNC: 43 U/L (ref 40–129)
ALT SERPL-CCNC: 65 U/L (ref 10–40)
ANION GAP SERPL CALCULATED.3IONS-SCNC: 10 MMOL/L (ref 3–16)
AST SERPL-CCNC: 37 U/L (ref 15–37)
BASOPHILS ABSOLUTE: 0.1 K/UL (ref 0–0.2)
BASOPHILS RELATIVE PERCENT: 0.9 %
BILIRUB SERPL-MCNC: <0.2 MG/DL (ref 0–1)
BUN BLDV-MCNC: 8 MG/DL (ref 7–20)
CALCIUM SERPL-MCNC: 9 MG/DL (ref 8.3–10.6)
CHLORIDE BLD-SCNC: 103 MMOL/L (ref 99–110)
CO2: 22 MMOL/L (ref 21–32)
CREAT SERPL-MCNC: <0.5 MG/DL (ref 0.6–1.1)
EOSINOPHILS ABSOLUTE: 0.2 K/UL (ref 0–0.6)
EOSINOPHILS RELATIVE PERCENT: 2.6 %
GFR AFRICAN AMERICAN: >60
GFR NON-AFRICAN AMERICAN: >60
GLOBULIN: 3.3 G/DL
GLUCOSE BLD-MCNC: 105 MG/DL (ref 70–99)
HCT VFR BLD CALC: 32 % (ref 36–48)
HCT VFR BLD CALC: 32.7 % (ref 36–48)
HEMOGLOBIN: 10.5 G/DL (ref 12–16)
IMMATURE RETIC FRACT: 0.52 (ref 0.21–0.37)
INR BLD: 1.02 (ref 0.86–1.14)
LV EF: 55 %
LVEF MODALITY: NORMAL
LYMPHOCYTES ABSOLUTE: 1.6 K/UL (ref 1–5.1)
LYMPHOCYTES RELATIVE PERCENT: 26.7 %
MAGNESIUM: 1.9 MG/DL (ref 1.8–2.4)
MCH RBC QN AUTO: 24.1 PG (ref 26–34)
MCHC RBC AUTO-ENTMCNC: 32.7 G/DL (ref 31–36)
MCV RBC AUTO: 73.5 FL (ref 80–100)
MONOCYTES ABSOLUTE: 0.7 K/UL (ref 0–1.3)
MONOCYTES RELATIVE PERCENT: 12.3 %
NEUTROPHILS ABSOLUTE: 3.4 K/UL (ref 1.7–7.7)
NEUTROPHILS RELATIVE PERCENT: 57.5 %
PDW BLD-RTO: 15 % (ref 12.4–15.4)
PLATELET # BLD: 231 K/UL (ref 135–450)
PMV BLD AUTO: 7.8 FL (ref 5–10.5)
POTASSIUM REFLEX MAGNESIUM: 3.4 MMOL/L (ref 3.5–5.1)
PRO-BNP: 76 PG/ML (ref 0–124)
PROTHROMBIN TIME: 11.6 SEC (ref 9.8–13)
RBC # BLD: 4.35 M/UL (ref 4–5.2)
RETICULOCYTE ABSOLUTE COUNT: 0.09 M/UL (ref 0.02–0.1)
RETICULOCYTE COUNT PCT: 2.12 % (ref 0.5–2.18)
SODIUM BLD-SCNC: 135 MMOL/L (ref 136–145)
TOTAL PROTEIN: 7 G/DL (ref 6.4–8.2)
TROPONIN: <0.01 NG/ML
WBC # BLD: 5.9 K/UL (ref 4–11)

## 2019-08-28 PROCEDURE — 93280 PM DEVICE PROGR EVAL DUAL: CPT | Performed by: INTERNAL MEDICINE

## 2019-08-28 PROCEDURE — 84484 ASSAY OF TROPONIN QUANT: CPT

## 2019-08-28 PROCEDURE — 96361 HYDRATE IV INFUSION ADD-ON: CPT

## 2019-08-28 PROCEDURE — 83880 ASSAY OF NATRIURETIC PEPTIDE: CPT

## 2019-08-28 PROCEDURE — 99285 EMERGENCY DEPT VISIT HI MDM: CPT

## 2019-08-28 PROCEDURE — 82728 ASSAY OF FERRITIN: CPT

## 2019-08-28 PROCEDURE — 6370000000 HC RX 637 (ALT 250 FOR IP): Performed by: HOSPITALIST

## 2019-08-28 PROCEDURE — 99245 OFF/OP CONSLTJ NEW/EST HI 55: CPT | Performed by: INTERNAL MEDICINE

## 2019-08-28 PROCEDURE — 36415 COLL VENOUS BLD VENIPUNCTURE: CPT

## 2019-08-28 PROCEDURE — 80053 COMPREHEN METABOLIC PANEL: CPT

## 2019-08-28 PROCEDURE — 93010 ELECTROCARDIOGRAM REPORT: CPT | Performed by: INTERNAL MEDICINE

## 2019-08-28 PROCEDURE — 94760 N-INVAS EAR/PLS OXIMETRY 1: CPT

## 2019-08-28 PROCEDURE — 83550 IRON BINDING TEST: CPT

## 2019-08-28 PROCEDURE — 93005 ELECTROCARDIOGRAM TRACING: CPT | Performed by: EMERGENCY MEDICINE

## 2019-08-28 PROCEDURE — 83540 ASSAY OF IRON: CPT

## 2019-08-28 PROCEDURE — 85610 PROTHROMBIN TIME: CPT

## 2019-08-28 PROCEDURE — 85045 AUTOMATED RETICULOCYTE COUNT: CPT

## 2019-08-28 PROCEDURE — 99213 OFFICE O/P EST LOW 20 MIN: CPT | Performed by: INTERNAL MEDICINE

## 2019-08-28 PROCEDURE — G0378 HOSPITAL OBSERVATION PER HR: HCPCS

## 2019-08-28 PROCEDURE — 93306 TTE W/DOPPLER COMPLETE: CPT

## 2019-08-28 PROCEDURE — 96360 HYDRATION IV INFUSION INIT: CPT

## 2019-08-28 PROCEDURE — 85025 COMPLETE CBC W/AUTO DIFF WBC: CPT

## 2019-08-28 PROCEDURE — 2580000003 HC RX 258: Performed by: HOSPITALIST

## 2019-08-28 PROCEDURE — 83735 ASSAY OF MAGNESIUM: CPT

## 2019-08-28 PROCEDURE — 71046 X-RAY EXAM CHEST 2 VIEWS: CPT

## 2019-08-28 RX ORDER — POTASSIUM CHLORIDE 20 MEQ/1
40 TABLET, EXTENDED RELEASE ORAL ONCE
Status: COMPLETED | OUTPATIENT
Start: 2019-08-28 | End: 2019-08-28

## 2019-08-28 RX ORDER — ACETAMINOPHEN 325 MG/1
650 TABLET ORAL EVERY 4 HOURS PRN
Status: DISCONTINUED | OUTPATIENT
Start: 2019-08-28 | End: 2019-08-31 | Stop reason: HOSPADM

## 2019-08-28 RX ORDER — ASPIRIN 81 MG/1
81 TABLET ORAL DAILY
Status: DISCONTINUED | OUTPATIENT
Start: 2019-08-28 | End: 2019-08-31 | Stop reason: HOSPADM

## 2019-08-28 RX ORDER — 0.9 % SODIUM CHLORIDE 0.9 %
1000 INTRAVENOUS SOLUTION INTRAVENOUS ONCE
Status: COMPLETED | OUTPATIENT
Start: 2019-08-28 | End: 2019-08-28

## 2019-08-28 RX ORDER — SODIUM CHLORIDE 0.9 % (FLUSH) 0.9 %
10 SYRINGE (ML) INJECTION PRN
Status: DISCONTINUED | OUTPATIENT
Start: 2019-08-28 | End: 2019-08-31 | Stop reason: HOSPADM

## 2019-08-28 RX ORDER — SODIUM CHLORIDE 0.9 % (FLUSH) 0.9 %
10 SYRINGE (ML) INJECTION EVERY 12 HOURS SCHEDULED
Status: DISCONTINUED | OUTPATIENT
Start: 2019-08-28 | End: 2019-08-31 | Stop reason: HOSPADM

## 2019-08-28 RX ORDER — MAGNESIUM SULFATE 1 G/100ML
1 INJECTION INTRAVENOUS PRN
Status: DISCONTINUED | OUTPATIENT
Start: 2019-08-28 | End: 2019-08-31 | Stop reason: HOSPADM

## 2019-08-28 RX ORDER — POTASSIUM CHLORIDE 7.45 MG/ML
10 INJECTION INTRAVENOUS PRN
Status: DISCONTINUED | OUTPATIENT
Start: 2019-08-28 | End: 2019-08-31 | Stop reason: HOSPADM

## 2019-08-28 RX ORDER — FUROSEMIDE 20 MG/1
20 TABLET ORAL DAILY PRN
Status: DISCONTINUED | OUTPATIENT
Start: 2019-08-28 | End: 2019-08-31 | Stop reason: HOSPADM

## 2019-08-28 RX ADMIN — ASPIRIN 81 MG: 81 TABLET, COATED ORAL at 08:27

## 2019-08-28 RX ADMIN — SODIUM CHLORIDE 1000 ML: 9 INJECTION, SOLUTION INTRAVENOUS at 06:09

## 2019-08-28 RX ADMIN — POTASSIUM CHLORIDE 40 MEQ: 1500 TABLET, EXTENDED RELEASE ORAL at 06:09

## 2019-08-28 RX ADMIN — Medication 10 ML: at 23:00

## 2019-08-28 ASSESSMENT — PAIN SCALES - GENERAL
PAINLEVEL_OUTOF10: 0

## 2019-08-28 ASSESSMENT — HEART SCORE: ECG: 0

## 2019-08-28 NOTE — CONSULTS
32yo G1 BF at 13 weeks gestation with long hx of cardiac problems being taken care of by [facility] presented with acute SOB. PMHx see chart  PSHx see chart, also tonsils and adenoids. Meds-see chart     Soc Hx- works for [employer], denies tobacco, alcohol, etc.  Birth control- condoms. PE    HEENT unremarkable  Abdomen soft  FHT found and normal rate. IUP 13 weeks  Multiple cardiac issues    Plan is per cardiology.   Routine care per [facility]

## 2019-08-28 NOTE — ED PROVIDER NOTES
repair at 4 months    PACEMAKER PLACEMENT  07/31/2019    TONSILLECTOMY      and adnoids removed    VSD REPAIR      baby         CURRENT MEDICATIONS       Previous Medications    ALBUTEROL (PROVENTIL) (2.5 MG/3ML) 0.083% NEBULIZER SOLUTION    Take 2.5 mg by nebulization every 6 hours as needed. ASPIRIN 81 MG TABLET    Take 81 mg by mouth daily    FERROUS SULFATE 325 (65 FE) MG EC TABLET    Take 65 mg by mouth daily    PRENATAL VIT-FE FUMARATE-FA (PREPLUS PO)    Take 1 tablet by mouth daily    PYRIDOXINE (B-6) 25 MG TABLET    Take 25 mg by mouth 3 times daily       ALLERGIES     Caffeine and Red dye    FAMILY HISTORY     History reviewed. No pertinent family history.        SOCIAL HISTORY       Social History     Socioeconomic History    Marital status: Single     Spouse name: None    Number of children: None    Years of education: None    Highest education level: None   Occupational History    None   Social Needs    Financial resource strain: None    Food insecurity:     Worry: None     Inability: None    Transportation needs:     Medical: None     Non-medical: None   Tobacco Use    Smoking status: Never Smoker    Smokeless tobacco: Never Used   Substance and Sexual Activity    Alcohol use: Yes     Comment: occ    Drug use: No    Sexual activity: None   Lifestyle    Physical activity:     Days per week: None     Minutes per session: None    Stress: None   Relationships    Social connections:     Talks on phone: None     Gets together: None     Attends [employer] service: None     Active member of club or organization: None     Attends meetings of clubs or organizations: None     Relationship status: None    Intimate partner violence:     Fear of current or ex partner: None     Emotionally abused: None     Physically abused: None     Forced sexual activity: None   Other Topics Concern    None   Social History Narrative    None       SCREENINGS      Heart Score for chest pain patients  History: Performed by ED Physician - none    LABS:  Labs Reviewed   CBC WITH AUTO DIFFERENTIAL - Abnormal; Notable for the following components:       Result Value    Hemoglobin 10.5 (*)     Hematocrit 32.0 (*)     MCV 73.5 (*)     MCH 24.1 (*)     All other components within normal limits    Narrative:     Performed at:  [facility] [address], [facility], [address]   Phone [phone]   COMPREHENSIVE METABOLIC PANEL W/ REFLEX TO MG FOR LOW K - Abnormal; Notable for the following components:    Sodium 135 (*)     Potassium reflex Magnesium 3.4 (*)     Glucose 105 (*)     CREATININE <0.5 (*)     ALT 65 (*)     All other components within normal limits    Narrative:     Performed at:  [facility] [address], [facility], [address]   Phone [phone] PEPTIDE    Narrative:     Performed at:  [facility] [address], [facility], [address]   Phone [phone]   TROPONIN    Narrative:     Performed at:  [facility] [address], [facility], [address]   Phone [phone]   PROTIME-INR    Narrative:     Performed at:  [facility] [address], [facility], [address]   Phone [phone]   MAGNESIUM    Narrative:     Performed at:  [facility] [address], [facility], [address]   Phone [phone]       All other labs were within normal range or not returned as of this dictation. EMERGENCY DEPARTMENT COURSE and DIFFERENTIAL DIAGNOSIS/MDM:   Vitals:    Vitals:    08/28/19 0120 08/28/19 0140 08/28/19 0240 08/28/19 0300   BP: (!) 105/44 (!) 101/54 120/63 118/60   Pulse: 72 79 72 76   Resp:       Temp:       SpO2: 96% 100% 99% 100%   Weight:       Height:           Adult female who is 13 weeks pregnant who comes in for shortness of breath and chest pain.   The patient's chart is reviewed she has extensive cardiac history including atrial fibrillation, AV block. VSD. The patient's chart also revealed that she had a pacemaker placed July 31, 2019. Laboratory studies are nondiagnostic. I did discuss with the patient the risk versus benefit of obtaining a chest x-ray. No acute process is seen. Pacemaker leads appear in place. Given the patient extensive cardiac history and the fact that she has had these episodes in the past and was recently seen by cardiology also the fact that she is pregnant I believe that the patient is high risk for serious or life-threatening emergencies and therefore she should be admitted to the hospital for further care. I discussed this with the patient and she is agreeable with this plan. I spoke to the hospitalist on duty about this patient and he is agreed to admit this patient to his service. CRITICAL CARE TIME   None       CONSULTS:  IP CONSULT TO HOSPITALIST  IP CONSULT TO CARDIOLOGY    PROCEDURES:  Unless otherwise noted above, none     Procedures    FINAL IMPRESSION      1. Dyspnea and respiratory abnormalities    2.  Chest pain, unspecified type          DISPOSITION/PLAN   DISPOSITION Admitted 08/28/2019 03:19:28 AM      PATIENT REFERREDTO:  Nazario Barber MD  [facility].  [address]            DISCHARGEMEDICATIONS:  New Prescriptions    No medications on file          (Please note that portions of this note were completed with a voice recognition program.  Efforts were made to edit the dictations but occasionally words are mis-transcribed.)    Rose Mary Gan MD (electronically signed)  Attending Emergency Physician       Rose Mary Gan MD  08/28/19 0666

## 2019-08-28 NOTE — ED NOTES
Report called to , RN verbalized understanding and denied any need for further information, patient visible on tele monitor on unit, patient to be transported to unit at this time      Oralia Aguilar RN  08/28/19 9910

## 2019-08-28 NOTE — H&P
_    [facility] HOSPITALIST HISTORY AND PHYSICAL    8/28/2019 3:23 AM        Patient Information:  Jeni Serra is a 29 y.o. female [id number]    PCP:  Austin Knutson MD (Tel: [phone] )        Date of Service:   Pt seen/examined on 8/28/2019     Placed in Observation. Chief complaint:    Chief Complaint   Patient presents with    Shortness of Breath     pt states shortness of breath woke her up out of her sleep, had CP enroute to hospital, 3 months pregnant first pregnancy, extensive cardiac hx       History of Present Illness:  Starr Mckenzie is a 29 y.o. female who presented with   · Mode of arrival : Walk in  · States that she woke up from sleep feeling SOB and \" feeling frozen and unable to respond \" for few minutes per her BF. Tried to sit upright by keeping pillows under her head but remained w/ SOB symptoms . Decided to come to ED. Also Chest pain and palpitations reported en route to hospital . Chest pain was substernal location , w/ no radiation and resolved in few minutes only   · No calf pain   · No recent Travel   · Has a PPM and Loop Recorder in place . History obtained from   · Patient      · Prior chart  As well       So far, ED Findings/Workup/Labs shows :   · BP. Stable     · HR. Stable     · Saturations. Stable   · Temperature. Afebrile       · Imaging done in ED as below. And is/are s/o No acute or concerning findings/pathology noted on review   · Pertinent Abnormal Labs and assessment as below. While in ED, patient care and medications given :   · Imaging  CXR  , done in ED   · ASA       Currently, on my evaluation, patient is :   · Since arrival, post above Rx, patient is feeling OK   · No acute symptoms currently @ rest   · Does get SOB w/ exertion though   · No chest pain on inspiration   · No chest pain @ rest   · No hypoxemia   · No tachycardia noted     10 complete review of symptoms performed. Pertinent positives and negatives listed above in HPI.      REVIEW OF SYSTEMS:     Constitutional:  Negative for fever, chills or night sweats  ENT:   Negative for rhinorrhea, epistaxis, hoarseness, sore throat. Respiratory:  As above Cardiovascular: as above    Gastrointestinal:   Negative for nausea, vomiting, diarrhea  Genitourinary:   Negative for polyuria, dysuria   Hematologic/Lymphatic:   Negative for  bleeding tendency, easy bruising  Musculoskeletal:   Negative for myalgias and arthralgias  Neurologic:   Negative for  Confusion, dysarthria. Skin:   Negative for itching,rash  Psychiatric:  Negative for depression, anxiety, agitation. Endocrine:  Negative for polydipsia, polyuria,heat /cold intolerance. Past Medical History:         has a past medical history of Asthma, CHF (congestive heart failure) ([facility] Congenital heart defect, Migraines, basilar, Palpitations, Sciatica, and VSD (ventricular septal defect and aortic arch hypoplasia. Past Surgical History:         has a past surgical history that includes VSD repair; Cardiac surgery; pacemaker placement (07/31/2019); and Tonsillectomy. Medications:      Current Outpatient Medications on File Prior to Encounter   Medication Sig Dispense Refill    Prenatal Vit-Fe Fumarate-FA (PREPLUS PO) Take 1 tablet by mouth daily      aspirin 81 MG tablet Take 81 mg by mouth daily      albuterol (PROVENTIL) (2.5 MG/3ML) 0.083% nebulizer solution Take 2.5 mg by nebulization every 6 hours as needed.  pyridoxine (B-6) 25 MG tablet Take 25 mg by mouth 3 times daily      ferrous sulfate 325 (65 Fe) MG EC tablet Take 65 mg by mouth daily           Allergies: Allergies   Allergen Reactions    Caffeine Hives and Palpitations    Red Dye Hives and Palpitations          Social History:   reports that she has never smoked. She has never used smokeless tobacco. She reports that she drinks alcohol. She reports that she does not use drugs. Family History:      History reviewed.  No pertinent family Admission EKG reviewed. EKG prn chest pain. Cardiac enzymes on admission in ED is negative. Will trend cardiac enzymes further while inpatient. Fasting lipid panel in AM. => Medication Plan : ASA QD . Further Mgt per cardiology recommendations   · PAF w/ CHB  : recent PPM placement in 8/2019 by EP/Cardiology . ? Device interrogation in AM   · H/o VSD repair in past in 1985   · H/o double chamber right ventricle requiring RV muscle bundle resection in 2000  · H/o AT s/p CVN in past in 2010   · Syncope 2/2 SSS/pause  : is now s/p PPM recently placed by EP   · Hypokalemia : replace prn   · Hyponatremia : replace w/ IVF prn   · Anemia , Microcytic : Suspected iron deficiency anemia : Will Check iron studies . Check B12/Folate levels . =>  Planned PRBC Transfusion. Monitor H/H post Transfusion => further on, will follow a restrictive transfusion protocol with PRBC for H/H < 7 or symptomatic anemia or acute Blood loss => Will f/u trend during inpatient stay and monitor closely. · Pregnant patient + ~ 3 months : gets her Obg care @        · Home medications for Chronic medical problems reviewed. · Home medications Held/Resumed, and Pertinent changes made in home medications on admission, as needed, according to current medical status, as mentioned above. Please see EPIC orders for detailed recommendations of plan and medications       · DVT Prophylaxis : SCDs   · GI Prophylaxis :  None as per orders   · Diet : NPO       · Code status : Full   · PT/OT and ambulatory Eval Status: Ambulate As tolerated   · Probable LOS & future Disposition planned post discharge  - home in 1-2 days       Medical Decision Making : HIGH     Total patient Care time spent in evaluating the patient an discussing plan with appropriate staff/patient/family members is 48 min       Rissa Lester MD    Hospitalist, [facility].    8/28/2019 3:51 AM

## 2019-08-28 NOTE — PLAN OF CARE
Problem: Pain Control  Goal: Maintain pain level at or below patient's acceptable level (or 5 if patient is unable to determine acceptable level)  Outcome: Ongoing  Denies pain when assessed, did complain of 4/10 pain to student but said it resolved when reassessed     Problem: Respiratory  Goal: O2 Sat > 90%  Outcome: Ongoing  Pt on room air, denies any issues or SOB

## 2019-08-29 LAB
A/G RATIO: 1.1 (ref 1.1–2.2)
ALBUMIN SERPL-MCNC: 3.5 G/DL (ref 3.4–5)
ALP BLD-CCNC: 42 U/L (ref 40–129)
ALT SERPL-CCNC: 61 U/L (ref 10–40)
ANION GAP SERPL CALCULATED.3IONS-SCNC: 11 MMOL/L (ref 3–16)
AST SERPL-CCNC: 35 U/L (ref 15–37)
BILIRUB SERPL-MCNC: 0.3 MG/DL (ref 0–1)
BUN BLDV-MCNC: 8 MG/DL (ref 7–20)
CALCIUM SERPL-MCNC: 9.1 MG/DL (ref 8.3–10.6)
CHLORIDE BLD-SCNC: 105 MMOL/L (ref 99–110)
CHOLESTEROL, TOTAL: 153 MG/DL (ref 0–199)
CO2: 21 MMOL/L (ref 21–32)
CREAT SERPL-MCNC: <0.5 MG/DL (ref 0.6–1.1)
D DIMER: 791 NG/ML DDU (ref 0–229)
FERRITIN: 28.5 NG/ML (ref 15–150)
GFR AFRICAN AMERICAN: >60
GFR NON-AFRICAN AMERICAN: >60
GLOBULIN: 3.3 G/DL
GLUCOSE BLD-MCNC: 106 MG/DL (ref 70–99)
HCT VFR BLD CALC: 33.9 % (ref 36–48)
HDLC SERPL-MCNC: 53 MG/DL (ref 40–60)
HEMOGLOBIN: 10.8 G/DL (ref 12–16)
IRON SATURATION: 18 % (ref 15–50)
IRON: 59 UG/DL (ref 37–145)
LDL CHOLESTEROL CALCULATED: 85 MG/DL
MAGNESIUM: 1.9 MG/DL (ref 1.8–2.4)
MCH RBC QN AUTO: 23.8 PG (ref 26–34)
MCHC RBC AUTO-ENTMCNC: 31.9 G/DL (ref 31–36)
MCV RBC AUTO: 74.6 FL (ref 80–100)
PDW BLD-RTO: 15.4 % (ref 12.4–15.4)
PLATELET # BLD: 232 K/UL (ref 135–450)
PMV BLD AUTO: 8.1 FL (ref 5–10.5)
POTASSIUM REFLEX MAGNESIUM: 4.1 MMOL/L (ref 3.5–5.1)
RBC # BLD: 4.54 M/UL (ref 4–5.2)
SODIUM BLD-SCNC: 137 MMOL/L (ref 136–145)
TOTAL IRON BINDING CAPACITY: 337 UG/DL (ref 260–445)
TOTAL PROTEIN: 6.8 G/DL (ref 6.4–8.2)
TRIGL SERPL-MCNC: 75 MG/DL (ref 0–150)
VLDLC SERPL CALC-MCNC: 15 MG/DL
WBC # BLD: 5.2 K/UL (ref 4–11)

## 2019-08-29 PROCEDURE — 85379 FIBRIN DEGRADATION QUANT: CPT

## 2019-08-29 PROCEDURE — 94760 N-INVAS EAR/PLS OXIMETRY 1: CPT

## 2019-08-29 PROCEDURE — 83735 ASSAY OF MAGNESIUM: CPT

## 2019-08-29 PROCEDURE — 80061 LIPID PANEL: CPT

## 2019-08-29 PROCEDURE — 99225 PR SBSQ OBSERVATION CARE/DAY 25 MINUTES: CPT | Performed by: INTERNAL MEDICINE

## 2019-08-29 PROCEDURE — 2580000003 HC RX 258: Performed by: HOSPITALIST

## 2019-08-29 PROCEDURE — 99219 PR INITIAL OBSERVATION CARE/DAY 50 MINUTES: CPT | Performed by: INTERNAL MEDICINE

## 2019-08-29 PROCEDURE — G0378 HOSPITAL OBSERVATION PER HR: HCPCS

## 2019-08-29 PROCEDURE — 85027 COMPLETE CBC AUTOMATED: CPT

## 2019-08-29 PROCEDURE — 80053 COMPREHEN METABOLIC PANEL: CPT

## 2019-08-29 PROCEDURE — 6370000000 HC RX 637 (ALT 250 FOR IP): Performed by: HOSPITALIST

## 2019-08-29 PROCEDURE — 36415 COLL VENOUS BLD VENIPUNCTURE: CPT

## 2019-08-29 PROCEDURE — 99226 PR SBSQ OBSERVATION CARE/DAY 35 MINUTES: CPT | Performed by: INTERNAL MEDICINE

## 2019-08-29 RX ORDER — FUROSEMIDE 20 MG/1
20 TABLET ORAL DAILY PRN
Qty: 10 TABLET | Refills: 0 | OUTPATIENT
Start: 2019-08-29 | End: 2019-09-08

## 2019-08-29 RX ORDER — PRENATAL VIT/IRON FUM/FOLIC AC 27MG-0.8MG
1 TABLET ORAL DAILY
Status: DISCONTINUED | OUTPATIENT
Start: 2019-08-29 | End: 2019-08-31 | Stop reason: HOSPADM

## 2019-08-29 RX ADMIN — Medication 10 ML: at 09:37

## 2019-08-29 RX ADMIN — Medication 10 ML: at 20:34

## 2019-08-29 RX ADMIN — ASPIRIN 81 MG: 81 TABLET, COATED ORAL at 09:37

## 2019-08-29 RX ADMIN — Medication 1 TABLET: at 09:37

## 2019-08-29 ASSESSMENT — PAIN SCALES - GENERAL
PAINLEVEL_OUTOF10: 0

## 2019-08-29 NOTE — PLAN OF CARE
I was called to initiate the transfer to [facility] per patient /her ob-gynecology request.  I spoke with attending hospitalist as well as pulmonology about need for CTPA . we called transfer center and spoke with hospitalist on the OB/GYN fellow there. After talking with the fellow, it felt like he does not really necessary to do the transfer unless the patient wanted to. Patient was little emotional and upset about mismatch between her OB/GYN and fellow's recommendations. She wants to wait until tomorrow morning and speak with Dr. Irais Adair again-at that point she will make decision about UC transfer. Watch her closely here.

## 2019-08-29 NOTE — PROGRESS NOTES
[facility]   Progress Note  CHF/Pulmonary Hypertension Cardiology    Chief complaint: We are following this patient for shortness of breath, relatively acute  HPI:  Dana Crump is a 30 yo female with a PMH including repaired DORV with LV dysfunction. She underwent interventricular tunnel VSD closure in 1985. She developed a double chamber RV muscle bundle resection in 2000. She developed atrial tachycardia in 2010 and she underwent successful cardioversion. She was referred last month to Dr. Candi Franco for palpitations. These episodes made her acutely SOB and dizzy. She also had syncope that was related to a pause that was recorded on her ILR. She is in her 2nd trimester of pregnancy.     She underwent pacemaker placement by Dr. Candi Franco  Several weeks ago. Last night she woke up from sleep feeling SOB. She felt frozen and was unable to respond for a few minutes. She propped herself up on pillows and felt beter. She came to the ED. She also noticed chest pain and palpitations en route to the hospital.  No calf pain, no recent travel.     She was not hypoxic on admission. troponins were negative and BNP was not elevated. Her chest xray was unremarkable, no pulmonary edema. The patient admits that she had a similar episode of SOB in the past before she became pregnant. The shortness of breath got better on its own. She ws not on any meds on admission     Echo:  8/28/19:   -Normal left ventricle size, wall thickness, and systolic function with an   estimated ejection fraction of 55%.  -Abnormal septal motion.   -Trivial mitral regurgitation.   -Patient has a history of a ventricular septal defect and VSD repair.   -Patient has a history of interventricular tunnel repair.   -Patient has a history of interventricular tunnel repair. The aortic valve   is poorly visualized.  There is a fixed gradient through the LV outflow area.   The peak gradient is estimated at 47 mmHg with a mean pressure

## 2019-08-29 NOTE — CONSULTS
[address]  Advanced CHF/Pulmonary Hypertension   Cardiac Evaluation      Rock Hall  YOB: 1985    REquesting PHysician:  Dr. Samira Jackman      Chief Complaint   Patient presents with    Shortness of Breath     pt states shortness of breath woke her up out of her sleep, had CP enroute to hospital, 3 months pregnant first pregnancy, extensive cardiac hx        History of Present Illness:  Rock Hall is a 28 yo female with a PMH including repaired DORV with LV dysfunction. She underwent interventricular tunnel VSD closure in 1985. She developed a double chamber RV muscle bundle resection in 2000. She developed atrial tachycardia in 2010 and she underwent successful cardioversion. She was referred last month to Dr. Samira Jackman for palpitations. These episodes made her acutely SOB and dizzy. She also had syncope that was related to a pause that was recorded on her ILR. She is in her 2nd trimester of pregnancy. She underwent pacemaker placement by Dr. Samira Jackman  Several weeks ago. Last night she woke up from sleep feeling SOB. She felt frozen and was unable to respond for a few minutes. She propped herself up on pillows and felt beter. She came to the ED. She also noticed chest pain and palpitations en route to the hospital.  No calf pain, no recent travel. She was not hypoxic on admission. troponins were negative and BNP was not elevated. Her chest xray was unremarkable, no pulmonary edema. The patient admits that she had a similar episode of SOB in the past before she became pregnant. The shortness of breath got better on its own. She ws not on any meds on admission    Echo:  8/28/19:   -Normal left ventricle size, wall thickness, and systolic function with an   estimated ejection fraction of 55%.    -Abnormal septal motion.   -Trivial mitral regurgitation.   -Patient has a history of a ventricular septal defect and VSD repair.   -Patient has a history of interventricular throat. · Cardiovascular: Reviewed in HPI  · Respiratory: No cough or wheezing, no sputum production. No hematemesis. · Gastrointestinal: No abdominal pain, appetite loss, blood in stools. No change in bowel or bladder habits. · Genitourinary: No dysuria, trouble voiding, or hematuria. · Musculoskeletal:  No gait disturbance, weakness or joint complaints. · Integumentary: No rash or pruritis. · Neurological: No headache, diplopia, change in muscle strength, numbness or tingling. No change in gait, balance, coordination, mood, affect, memory, mentation, behavior. · Psychiatric: No anxiety, no depression. · Endocrine: No malaise, fatigue or temperature intolerance. No excessive thirst, fluid intake, or urination. No tremor. · Hematologic/Lymphatic: No abnormal bruising or bleeding, blood clots or swollen lymph nodes. · Allergic/Immunologic: No nasal congestion or hives. Physical Examination:    Vitals:    08/28/19 0415 08/28/19 0748 08/28/19 0912 08/28/19 1231   BP: 115/66  127/82 111/72   Pulse: 72  77 73   Resp: 20 18 16 16   Temp: 96.1 °F (35.6 °C)  97.4 °F (36.3 °C) 97.4 °F (36.3 °C)   TempSrc: Temporal  Temporal    SpO2: 98% 98% 99% 100%   Weight: 291 lb 8 oz (132.2 kg)      Height: 5' 7\" (1.702 m)        Body mass index is 45.66 kg/m².      Wt Readings from Last 3 Encounters:   08/28/19 291 lb 8 oz (132.2 kg)   08/01/19 286 lb 13.1 oz (130.1 kg)   07/24/19 284 lb (128.8 kg)     BP Readings from Last 3 Encounters:   08/28/19 111/72   08/01/19 115/71   07/24/19 100/68     Constitutional and General Appearance:   WD/WN in NAD, overweight female  HEENT:  NC/AT  HERVE  No problems with hearing  Skin:  Warm, dry  Respiratory:  · Normal excursion and expansion without use of accessory muscles  · Resp Auscultation: Normal breath sounds without dullness  Cardiovascular:  · The apical impulses not displaced  · Heart tones are crisp and normal  · Cervical veins are not engorged  · The carotid upstroke is

## 2019-08-29 NOTE — PROGRESS NOTES
[facility]    8/29/2019 3:53 PM        Name: David Collins . Admitted: 8/28/2019  Primary Care Provider: Dwayne Davis MD (Tel: [phone])    Brief Course:        CC: Chest pain/sob    Subjective: Myron Cook   Mother at bedside  Pulmonary seeing the     Reviewed interval ancillary notes    Current Medications    prenatal vitamin tablet 1 tablet Daily   aspirin EC tablet 81 mg Daily   sodium chloride flush 0.9 % injection 10 mL 2 times per day   sodium chloride flush 0.9 % injection 10 mL PRN   potassium chloride 10 mEq/100 mL IVPB (Peripheral Line) PRN   magnesium sulfate 1 g in dextrose 5% 100 mL IVPB PRN   magnesium hydroxide (MILK OF MAGNESIA) 400 MG/5ML suspension 30 mL Daily PRN   acetaminophen (TYLENOL) tablet 650 mg Q4H PRN   perflutren lipid microspheres (DEFINITY) injection 1.65 mg ONCE PRN   furosemide (LASIX) tablet 20 mg Daily PRN       Objective:  /61   Pulse 71   Temp 97.8 °F (36.6 °C) (Oral)   Resp 16   Ht 5' 7\" (1.702 m)   Wt 292 lb 9.6 oz (132.7 kg)   LMP 05/26/2019 (Exact Date)   SpO2 99%   BMI 45.83 kg/m²     Intake/Output Summary (Last 24 hours) at 8/29/2019 1553  Last data filed at 8/29/2019 0937  Gross per 24 hour   Intake 10 ml   Output --   Net 10 ml    Wt Readings from Last 3 Encounters:   08/29/19 292 lb 9.6 oz (132.7 kg)   08/01/19 286 lb 13.1 oz (130.1 kg)   07/24/19 284 lb (128.8 kg)   obese   No significant pitting leg edema  Lungs clear  S1S2 heard  Oral mucosa moist   No palor, no icterus      Labs and Tests:  CBC:   Recent Labs     08/28/19 0100 08/29/19  0426   WBC 5.9 5.2   HGB 10.5* 10.8*    232     BMP:  Recent Labs     08/28/19 0100 08/29/19  0426   * 137   K 3.4* 4.1    105   CO2 22 21   BUN 8 8   CREATININE <0.5* <0.5*   GLUCOSE 105* 106*     Hepatic: Recent Labs     08/28/19  0100 08/29/19  0426   AST 37 35   ALT 65* 61*   BILITOT <0.2

## 2019-08-30 ENCOUNTER — APPOINTMENT (OUTPATIENT)
Dept: CT IMAGING | Age: 34
End: 2019-08-30
Payer: COMMERCIAL

## 2019-08-30 PROCEDURE — 94760 N-INVAS EAR/PLS OXIMETRY 1: CPT

## 2019-08-30 PROCEDURE — 6370000000 HC RX 637 (ALT 250 FOR IP): Performed by: HOSPITALIST

## 2019-08-30 PROCEDURE — 99226 PR SBSQ OBSERVATION CARE/DAY 35 MINUTES: CPT | Performed by: INTERNAL MEDICINE

## 2019-08-30 PROCEDURE — 99225 PR SBSQ OBSERVATION CARE/DAY 25 MINUTES: CPT | Performed by: INTERNAL MEDICINE

## 2019-08-30 PROCEDURE — 2580000003 HC RX 258: Performed by: HOSPITALIST

## 2019-08-30 PROCEDURE — G0378 HOSPITAL OBSERVATION PER HR: HCPCS

## 2019-08-30 RX ADMIN — Medication 10 ML: at 11:32

## 2019-08-30 RX ADMIN — Medication 10 ML: at 22:36

## 2019-08-30 RX ADMIN — ASPIRIN 81 MG: 81 TABLET, COATED ORAL at 11:31

## 2019-08-30 ASSESSMENT — PAIN SCALES - GENERAL
PAINLEVEL_OUTOF10: 0
PAINLEVEL_OUTOF10: 0

## 2019-08-30 NOTE — PROGRESS NOTES
gradient of   27 mmHg. It is unclear if the obstruction is due to the VSD repair or if  Merry Smolder is some aortic stenosis. The obstruction is moderate.   -Normal diastolic function. Avg. E/e=8.65   -Bubble study performed with no obvious right to left shunt.   -Pacemaker wire noted in the right heart. ROS:  Today she continues to be breathless with conversation. She is audibly wheezing. She tells me that she has used an inhaler in the past, not on one now. All cardiac testing unremarkable for cause for shortness of breath. She has not taken any lasix at this time. Was seen yesterday by pulmonary and CTPA ordered. It is to be performed later today.     Medications/Labs all Reviewed    Lab Results   Component Value Date    WBC 5.2 08/29/2019    HGB 10.8 (L) 08/29/2019    HCT 33.9 (L) 08/29/2019    MCV 74.6 (L) 08/29/2019     08/29/2019     Lab Results   Component Value Date    CREATININE <0.5 (L) 08/29/2019    BUN 8 08/29/2019     08/29/2019    K 4.1 08/29/2019     08/29/2019    CO2 21 08/29/2019     Lab Results   Component Value Date    INR 1.02 08/28/2019    PROTIME 11.6 08/28/2019        Physical Examination:    BP (!) 142/64   Pulse 73   Temp 97.6 °F (36.4 °C) (Oral)   Resp 16   Ht 5' 7\" (1.702 m)   Wt 293 lb 3.2 oz (133 kg)   LMP 05/26/2019 (Exact Date)   SpO2 98%   BMI 45.92 kg/m²      WD/WN, audibly wheezing  HEENT:  NC/AT  Respiratory: short of breath with conversation  · Resp Assessment: Normal respiratory effort  · Resp Auscultation: Clear to auscultation bilaterally   Cardiovascular:  · Auscultation: regular rate and rhythm, normal S1S2, no murmur, rub or gallop  · Palpation:  Nl PMI  · JVP:  normal  · Extremities: No Edema  Abdomen:  · Soft, non-tender  · Normal bowel sounds  Extremities:  ·  No Cyanosis or Clubbing  Neurological/Psychiatric:  · Oriented to time, place, and person  · Non-anxious  Skin Warm and dry    Lab Results   Component Value Date     08/29/2019  08/28/2019    K 4.1 08/29/2019    K 3.4 08/28/2019    BUN 8 08/29/2019    BUN 8 08/28/2019    CREATININE <0.5 08/29/2019    CREATININE <0.5 08/28/2019    CREATININE 0.7 07/31/2019    GLUCOSE 106 08/29/2019    GLUCOSE 105 08/28/2019     Lab Results   Component Value Date    PROBNP 76 08/28/2019     Lab Results   Component Value Date    ALT 61 (H) 08/29/2019    ALT 65 (H) 08/28/2019    AST 35 08/29/2019    AST 37 08/28/2019     Lab Results   Component Value Date    HGB 10.8 08/29/2019    HGB 10.5 08/28/2019    HCT 33.9 08/29/2019    HCT 32.0 08/28/2019    HCT 32.7 08/28/2019     08/29/2019     08/28/2019     Lab Results   Component Value Date    TRIG 75 08/29/2019    HDL 53 08/29/2019    LDLCALC 85 08/29/2019       Labs were reviewed including labs from other hospital systems through [facility]. Cardiac testing was reviewed including echos, nuclear scans, cardiac catheterization, including from other hospital systems through [facility]. Assessment:    1. Dyspnea and respiratory abnormalities    2. Chest pain, unspecified type     3. Congenital heart disease     Plan:  Unclear what etiology is for her SOB  No obvious reason for shortness of breath. Suggest giving her a prescription for low dose lasix to have at home to try if this occurs again. Obviously with being pregnant, do not want to use lasix daily. I am happy to follow the patient up in the office  Send in lasix 20 mg po qd prn SOB  CTPA being performed today. CHF education reinforced. ~salt restriction  ~fluid restriction  ~medication compliance  ~daily weights and notify of any significant weight gain/loss  ~establish with CHF nurse  ~outpatient follow-up with our CHF team    If CTPA is positive for PE, she will need to be initiated on anticoagulants with the help of her OB.       NYHA Class: 3    Drake Andrade MD, 8/30/2019 11:05 AM

## 2019-08-31 ENCOUNTER — APPOINTMENT (OUTPATIENT)
Dept: CT IMAGING | Age: 34
End: 2019-08-31
Payer: COMMERCIAL

## 2019-08-31 VITALS
DIASTOLIC BLOOD PRESSURE: 71 MMHG | HEIGHT: 67 IN | WEIGHT: 291.5 LBS | RESPIRATION RATE: 16 BRPM | OXYGEN SATURATION: 97 % | BODY MASS INDEX: 45.75 KG/M2 | TEMPERATURE: 98.3 F | HEART RATE: 76 BPM | SYSTOLIC BLOOD PRESSURE: 108 MMHG

## 2019-08-31 PROCEDURE — G0378 HOSPITAL OBSERVATION PER HR: HCPCS

## 2019-08-31 PROCEDURE — 99225 PR SBSQ OBSERVATION CARE/DAY 25 MINUTES: CPT | Performed by: INTERNAL MEDICINE

## 2019-08-31 PROCEDURE — 71260 CT THORAX DX C+: CPT

## 2019-08-31 PROCEDURE — 6370000000 HC RX 637 (ALT 250 FOR IP): Performed by: HOSPITALIST

## 2019-08-31 PROCEDURE — 94760 N-INVAS EAR/PLS OXIMETRY 1: CPT

## 2019-08-31 PROCEDURE — 2580000003 HC RX 258: Performed by: HOSPITALIST

## 2019-08-31 PROCEDURE — 6360000004 HC RX CONTRAST MEDICATION: Performed by: INTERNAL MEDICINE

## 2019-08-31 RX ORDER — FUROSEMIDE 20 MG/1
20 TABLET ORAL DAILY PRN
Qty: 10 TABLET | Refills: 0 | Status: SHIPPED | OUTPATIENT
Start: 2019-08-31 | End: 2020-09-03 | Stop reason: ALTCHOICE

## 2019-08-31 RX ADMIN — Medication 1 TABLET: at 08:22

## 2019-08-31 RX ADMIN — Medication 10 ML: at 08:22

## 2019-08-31 RX ADMIN — IOPAMIDOL 75 ML: 755 INJECTION, SOLUTION INTRAVENOUS at 12:18

## 2019-08-31 RX ADMIN — ASPIRIN 81 MG: 81 TABLET, COATED ORAL at 08:22

## 2019-08-31 ASSESSMENT — PAIN SCALES - GENERAL
PAINLEVEL_OUTOF10: 0
PAINLEVEL_OUTOF10: 0

## 2019-08-31 NOTE — PROGRESS NOTES
Call placed to the clinical  regarding my concern with the patient wanting to be discharged d/t radiology not wanting to place a second shield on patient. I also personally called and spoke to the radiologist technician who explained to me that she is the on who refused to place a second shield on the patient because \" new studies show that the marino create more radiation for the patient and her baby. \"  After speaking with radiology they have agreed to allow the patient to come down and use a second shield even if its not recommended at this time. I went in and spoke with the patient and her family to make them aware of the reasoning behind the problem with the second shield, I also let them know that radiology would be willing to scan her with two shields if that's what she wants at this time. After patient was informed she stated she will let me know if she still wants to be scanned or not after talking it over with her family. Hospitalist made aware at this time.    Kaylah Lainez RN

## 2019-09-03 LAB
EKG ATRIAL RATE: 76 BPM
EKG DIAGNOSIS: NORMAL
EKG P AXIS: 32 DEGREES
EKG P-R INTERVAL: 180 MS
EKG Q-T INTERVAL: 422 MS
EKG QRS DURATION: 148 MS
EKG QTC CALCULATION (BAZETT): 474 MS
EKG R AXIS: -10 DEGREES
EKG T AXIS: 10 DEGREES
EKG VENTRICULAR RATE: 76 BPM

## 2019-09-26 ENCOUNTER — FOLLOWUP TELEPHONE ENCOUNTER (OUTPATIENT)
Dept: INPATIENT UNIT | Age: 34
End: 2019-09-26

## 2019-09-27 ENCOUNTER — TELEPHONE (OUTPATIENT)
Dept: CARDIOLOGY CLINIC | Age: 34
End: 2019-09-27

## 2019-10-02 ENCOUNTER — NURSE ONLY (OUTPATIENT)
Dept: CARDIOLOGY CLINIC | Age: 34
End: 2019-10-02
Payer: COMMERCIAL

## 2019-10-02 ENCOUNTER — OFFICE VISIT (OUTPATIENT)
Dept: CARDIOLOGY CLINIC | Age: 34
End: 2019-10-02
Payer: COMMERCIAL

## 2019-10-02 VITALS
SYSTOLIC BLOOD PRESSURE: 102 MMHG | HEIGHT: 68 IN | WEIGHT: 293 LBS | BODY MASS INDEX: 44.41 KG/M2 | DIASTOLIC BLOOD PRESSURE: 54 MMHG

## 2019-10-02 DIAGNOSIS — I47.29 NSVT (NONSUSTAINED VENTRICULAR TACHYCARDIA): ICD-10-CM

## 2019-10-02 DIAGNOSIS — I48.0 PAF (PAROXYSMAL ATRIAL FIBRILLATION) (HCC): Primary | ICD-10-CM

## 2019-10-02 DIAGNOSIS — I48.0 PAF (PAROXYSMAL ATRIAL FIBRILLATION) (HCC): ICD-10-CM

## 2019-10-02 DIAGNOSIS — R06.02 SOB (SHORTNESS OF BREATH): ICD-10-CM

## 2019-10-02 DIAGNOSIS — I44.2 CHB (COMPLETE HEART BLOCK) (HCC): ICD-10-CM

## 2019-10-02 DIAGNOSIS — Z95.0 PACEMAKER: ICD-10-CM

## 2019-10-02 DIAGNOSIS — I44.2 AV BLOCK, COMPLETE (HCC): ICD-10-CM

## 2019-10-02 DIAGNOSIS — I48.92 ATRIAL FLUTTER, UNSPECIFIED TYPE (HCC): ICD-10-CM

## 2019-10-02 PROCEDURE — 93280 PM DEVICE PROGR EVAL DUAL: CPT | Performed by: INTERNAL MEDICINE

## 2019-10-02 PROCEDURE — 99214 OFFICE O/P EST MOD 30 MIN: CPT | Performed by: INTERNAL MEDICINE

## 2019-10-07 ENCOUNTER — TELEPHONE (OUTPATIENT)
Dept: CARDIOLOGY CLINIC | Age: 34
End: 2019-10-07

## 2019-10-23 ENCOUNTER — PROCEDURE VISIT (OUTPATIENT)
Dept: CARDIOLOGY CLINIC | Age: 34
End: 2019-10-23

## 2019-10-23 DIAGNOSIS — Z95.0 PACEMAKER: Primary | ICD-10-CM

## 2019-11-08 ENCOUNTER — TELEPHONE (OUTPATIENT)
Dept: CARDIOLOGY CLINIC | Age: 34
End: 2019-11-08

## 2019-12-12 ENCOUNTER — TELEPHONE (OUTPATIENT)
Dept: CARDIOLOGY CLINIC | Age: 34
End: 2019-12-12

## 2019-12-12 ENCOUNTER — NURSE ONLY (OUTPATIENT)
Dept: CARDIOLOGY CLINIC | Age: 34
End: 2019-12-12

## 2019-12-12 ENCOUNTER — OFFICE VISIT (OUTPATIENT)
Dept: CARDIOLOGY CLINIC | Age: 34
End: 2019-12-12
Payer: COMMERCIAL

## 2019-12-12 VITALS
HEIGHT: 69 IN | RESPIRATION RATE: 20 BRPM | SYSTOLIC BLOOD PRESSURE: 109 MMHG | WEIGHT: 293 LBS | DIASTOLIC BLOOD PRESSURE: 70 MMHG | BODY MASS INDEX: 43.4 KG/M2 | HEART RATE: 74 BPM

## 2019-12-12 DIAGNOSIS — Z95.0 PACEMAKER: ICD-10-CM

## 2019-12-12 DIAGNOSIS — Q24.9 CONGENITAL HEART DEFECT: ICD-10-CM

## 2019-12-12 DIAGNOSIS — I50.32 CHRONIC DIASTOLIC HEART FAILURE (HCC): ICD-10-CM

## 2019-12-12 DIAGNOSIS — I44.2 CHB (COMPLETE HEART BLOCK) (HCC): ICD-10-CM

## 2019-12-12 DIAGNOSIS — I48.0 PAF (PAROXYSMAL ATRIAL FIBRILLATION) (HCC): Primary | ICD-10-CM

## 2019-12-12 DIAGNOSIS — R00.2 PALPITATION: ICD-10-CM

## 2019-12-12 DIAGNOSIS — I47.29 NSVT (NONSUSTAINED VENTRICULAR TACHYCARDIA): ICD-10-CM

## 2019-12-12 PROCEDURE — 93280 PM DEVICE PROGR EVAL DUAL: CPT | Performed by: INTERNAL MEDICINE

## 2019-12-12 PROCEDURE — 99214 OFFICE O/P EST MOD 30 MIN: CPT | Performed by: INTERNAL MEDICINE

## 2019-12-12 RX ORDER — METOPROLOL SUCCINATE 50 MG/1
50 TABLET, EXTENDED RELEASE ORAL
COMMUNITY
Start: 2019-11-26 | End: 2020-06-25

## 2019-12-12 RX ORDER — MOMETASONE FUROATE 220 UG/1
INHALANT RESPIRATORY (INHALATION)
Refills: 1 | COMMUNITY
Start: 2019-10-05 | End: 2021-02-26 | Stop reason: ALTCHOICE

## 2019-12-12 RX ORDER — SOTALOL HYDROCHLORIDE 80 MG/1
TABLET ORAL
Refills: 2 | COMMUNITY
Start: 2019-11-25 | End: 2021-01-08

## 2019-12-27 ENCOUNTER — TELEPHONE (OUTPATIENT)
Dept: CARDIOLOGY CLINIC | Age: 34
End: 2019-12-27

## 2020-01-08 ENCOUNTER — TELEPHONE (OUTPATIENT)
Dept: CARDIOLOGY CLINIC | Age: 35
End: 2020-01-08

## 2020-01-08 NOTE — TELEPHONE ENCOUNTER
Patient called. Short of breath and had a couple episodes of feeling like she could pass out. She is pregnant and I advised her to send a transmission and go to the ED.

## 2020-01-23 ENCOUNTER — TELEPHONE (OUTPATIENT)
Dept: CARDIOLOGY CLINIC | Age: 35
End: 2020-01-23

## 2020-03-17 ENCOUNTER — TELEPHONE (OUTPATIENT)
Dept: CARDIOLOGY CLINIC | Age: 35
End: 2020-03-17

## 2020-03-24 ENCOUNTER — TELEPHONE (OUTPATIENT)
Dept: CARDIOLOGY CLINIC | Age: 35
End: 2020-03-24

## 2020-03-25 ENCOUNTER — TELEPHONE (OUTPATIENT)
Dept: CARDIOLOGY CLINIC | Age: 35
End: 2020-03-25

## 2020-04-09 ENCOUNTER — TELEPHONE (OUTPATIENT)
Dept: CARDIOLOGY CLINIC | Age: 35
End: 2020-04-09

## 2020-04-09 NOTE — TELEPHONE ENCOUNTER
Remote received. Carelink transmission shows normal sensing and pacing function. Current NSR. AP 24.2%.  <.01%. 7 NSVT episodes noted. Last on 4/6/2020, longest 4 seconds with a Max A/V rate of 100/205 bpm. 63 Fast A & V. Last on 4/8/2020, longest 1 minute 41 seconds with a Max A/V of 207/207 bpm. Patient remains on metoprolol and sotalol. See interrogation for more details. Please see interrogation for more detail. Please advise.  Thanks

## 2020-04-10 ENCOUNTER — VIRTUAL VISIT (OUTPATIENT)
Dept: CARDIOLOGY CLINIC | Age: 35
End: 2020-04-10
Payer: COMMERCIAL

## 2020-04-10 VITALS — HEIGHT: 69 IN | BODY MASS INDEX: 43.4 KG/M2 | WEIGHT: 293 LBS

## 2020-04-10 PROCEDURE — 99214 OFFICE O/P EST MOD 30 MIN: CPT | Performed by: INTERNAL MEDICINE

## 2020-04-10 NOTE — PROGRESS NOTES
[address]   Electrophysiology Follow Up   Date: 4/10/2020    CC:  Syncope with pause    HPI: Gabe Hodges is a 29 y.o. female with pmh repaired DORV with LV dysfunction. She underwent interventricular tunnel VSD closure in 1985. 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 She developed atrial tachycardia in 2010 and she underwent successful cardioversion. She is referred today for further evaluation of palpitations. She reports she has intermittent palpitations that make her acutely SOB and dizzy, no arrhythmias have been captured by EKG. She reports the episodes last 5-7 minutes in duration. 8/2/19: Insertion of dual chamber pacemaker for CHB    Friday 9/27/19 she had chest pain and went to the ED and they discharged her. Saturday she had episodes of shortness of breath and her heart racing and she felt like she could barely walk. She laid down and rested and the episode eased up. She said when she was driving here she felt light headed. Her OB/GYN told her she could not take Lasix because of the amniotic fluid could be affected. On 10/2/19 she had a hospital admission for asthma related symptoms with treatment including albuterol, prednisone and corticosteroid inhaler. She had episodes of atrial fib/flutter with aRVR. Device interrogation today shows JADEN 14.8,  AP 0.2%,  <0.1%      Interval history:  She had her baby about 2 months ago. She has been having episodes of fluttering that come and go. The last one that was very severe was on 4/8 , 10:30 pmWe discussed her device interrogation. She has been off her beta blockers and we encouraged her to restart this.      Past Medical History:   Diagnosis Date    Asthma     [facility]     Congenital heart defect     Migraines, basilar     Palpitations     Sciatica     lower right    VSD (ventricular septal defect and aortic arch hypoplasia         Past

## 2020-05-18 ENCOUNTER — TELEPHONE (OUTPATIENT)
Dept: CARDIOLOGY CLINIC | Age: 35
End: 2020-05-18

## 2020-05-18 NOTE — TELEPHONE ENCOUNTER
Remote transmission received. Carelink transmission shows normal sensing and pacing function. Current EGM from [facility]. Battery life 14.3 years. AP 22.8%.  <0.1%. 1 NSVT episode noted on 5/16/2020 lasting 1 seconds with an Avg A/V rate of 70/222 bpm. 137 Fast A&V. Last on 5/16/2020, longest 2 minutes 44 seconds with a Max A/V rate of 182/182 bpm all EGM's 1:1. See interrogation for more details. Please advise.  Thanks

## 2020-05-18 NOTE — TELEPHONE ENCOUNTER
Dr. Martin Lies aware and reviewed transmission. No changes for now with her medications. No EP changes required.     Marlen Gramajo, SUKUMAR-CNP

## 2020-05-18 NOTE — TELEPHONE ENCOUNTER
Called and spoke with Robyn. She stated on Saturday 5/16 between the times of 4-6 pm she was woken up from her sleep because she couldn't breath. Correlates with the NSVT episode she had. Informed her that I would let Dr. Ball Learn know. I did let her know that he did review the transmission sent and did not want to make any changes at this time. I told her to keep us informed of her symptoms and episodes.

## 2020-06-18 ENCOUNTER — TELEPHONE (OUTPATIENT)
Dept: CARDIOLOGY CLINIC | Age: 35
End: 2020-06-18

## 2020-06-18 ENCOUNTER — NURSE ONLY (OUTPATIENT)
Dept: CARDIOLOGY CLINIC | Age: 35
End: 2020-06-18
Payer: COMMERCIAL

## 2020-06-18 PROCEDURE — 93296 REM INTERROG EVL PM/IDS: CPT | Performed by: INTERNAL MEDICINE

## 2020-06-18 PROCEDURE — 93294 REM INTERROG EVL PM/LDLS PM: CPT | Performed by: INTERNAL MEDICINE

## 2020-06-18 NOTE — LETTER
[address] [facility] [phone]  [address] [facility] [phone]    Pacemaker/Defibrillator Clinic          06/18/20        Marika Yang  [address] [facility]         Dear Marika Yang    This letter is to inform you that we received the transmission from your monitor at home that checks your implanted heart device. The next date you should transmit will be 10-19-20. If your report requires attention, we will notify you. Please be aware that the remote device transmission sites are periodically monitored only during regular business hours during which simultaneous in-office device clinics are being run. If your transmission requires attention, we will contact you as soon as possible. Thank you.             [address]

## 2020-06-25 ENCOUNTER — TELEPHONE (OUTPATIENT)
Dept: CARDIOLOGY CLINIC | Age: 35
End: 2020-06-25

## 2020-06-25 RX ORDER — METOPROLOL SUCCINATE 50 MG/1
75 TABLET, EXTENDED RELEASE ORAL DAILY
Qty: 30 TABLET | Refills: 0
Start: 2020-06-25 | End: 2021-05-26

## 2020-07-16 ENCOUNTER — VIRTUAL VISIT (OUTPATIENT)
Dept: CARDIOLOGY CLINIC | Age: 35
End: 2020-07-16
Payer: COMMERCIAL

## 2020-07-16 ENCOUNTER — NURSE ONLY (OUTPATIENT)
Dept: CARDIOLOGY CLINIC | Age: 35
End: 2020-07-16

## 2020-07-16 PROCEDURE — 99214 OFFICE O/P EST MOD 30 MIN: CPT | Performed by: INTERNAL MEDICINE

## 2020-07-16 NOTE — PROGRESS NOTES
[address]   Electrophysiology Virtual visit  Date: 7/16/2020    CC:  Syncope with pause    HPI: Kayla Umanzor is a 29 y.o. female with pmh repaired DORV with LV dysfunction. She underwent interventricular tunnel VSD closure in 1985. 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 She developed atrial tachycardia in 2010 and she underwent successful cardioversion. She is referred today for further evaluation of palpitations. She reports she has intermittent palpitations that make her acutely SOB and dizzy, no arrhythmias have been captured by EKG. She reports the episodes last 5-7 minutes in duration. 8/2/19: Insertion of dual chamber pacemaker for CHB    Friday 9/27/19 she had chest pain and went to the ED and they discharged her. Saturday she had episodes of shortness of breath and her heart racing and she felt like she could barely walk. She laid down and rested and the episode eased up. She said when she was driving here she felt light headed. Her OB/GYN told her she could not take Lasix because of the amniotic fluid could be affected. On 10/2/19 she had a hospital admission for asthma related symptoms with treatment including albuterol, prednisone and corticosteroid inhaler. She had episodes of atrial fib/flutter with aRVR. Interval history: Patient presents to the office via virtual visit. She is doing well from a cardiac standpoint. She recalls three episodes since her last appointment that she was presyncopal. She said the episodes last from a few seconds to 1 minute. We discussed supportive therapy including maintaining hydration.      Past Medical History:   Diagnosis Date    Asthma     CHF [facility]congestive heart failure) ([facility])     Congenital heart defect     Migraines, basilar     Palpitations     Sciatica     lower right    VSD (ventricular septal defect and aortic arch hypoplasia         Past Surgical History: Procedure Laterality Date    CARDIAC SURGERY      ventricular septal defect repair at 4 months    PACEMAKER PLACEMENT  07/31/2019    TONSILLECTOMY      and adnoids removed    VSD REPAIR      baby     Allergies: Allergies   Allergen Reactions    Caffeine Hives and Palpitations    Red Dye Hives and Palpitations       Social History:   reports that she has never smoked. She has never used smokeless tobacco. She reports current alcohol use. She reports that she does not use drugs. Family History:     Reviewed. Denies family history of sudden cardiac death, arrhythmia, premature CAD    Review of System:  All other systems reviewed and are negative except for that noted above. Pertinent negatives are:   General: negative for fever, chills   Ophthalmic ROS: negative for - eye pain or loss of vision  ENT ROS: negative for - headaches, sore throat   Respiratory: negative for - cough, sputum  Cardiovascular: Reviewed in HPI  Gastrointestinal: negative for - abdominal pain, diarrhea, N/V  Hematology: negative for - bleeding, blood clots, bruising or jaundice  Genito-Urinary:  negative for - Dysuria or incontinence  Musculoskeletal: negative for - Joint swelling, muscle pain  Neurological: negative for - confusion, dizziness, headaches   Psychiatric: No anxiety, no depression. Dermatological: negative for - rash    Physical Examination:  There were no vitals filed for this visit. Wt Readings from Last 3 Encounters:   04/10/20 295 lb (133.8 kg)   12/12/19 (!) 320 lb (145.2 kg)   10/02/19 298 lb (135.2 kg)       · Constitutional: Oriented. No distress. · Head: Normocephalic and atraumatic. · Mouth/Throat:    · Eyes: Conjunctivae normal. EOM are normal.   · Neck:  t.    · Cardiovascular:    · Pulmonary/Chest:     · Abdominal:   · Musculoskeletal:    · Lymphadenopathy:    · Neurological: Alert and oriented. · Skin:  No rash noted in visible areas.    · Psychiatric: Has a normal behavior     Labs, diagnostic and imaging results reviewed. Reviewed. Lab Results   Component Value Date    CREATININE <0.5 2019    CREATININE <0.5 2019    AST 35 2019    ALT 61 2019     EC20    None VV    Echo:  19  Summary   -Normal left ventricle size, wall thickness, and systolic function with an   estimated ejection fraction of 55%. -Abnormal septal motion.   -Trivial mitral regurgitation.   -Patient has a history of a ventricular septal defect and VSD repair.   -Patient has a history of interventricular tunnel repair.   -Patient has a history of interventricular tunnel repair. The aortic valve   is poorly visualized. There is a fixed gradient through the LV outflow area. The peak gradient is estimated at 47 mmHg with a mean pressure gradient of   27 mmHg. It is unclear if the obstruction is due to the VSD repair or if   there is some aortic stenosis. The obstruction is moderate.   -Normal diastolic function. Avg. E/e=8.65   -Bubble study performed with no obvious right to left shunt.   -Pacemaker wire noted in the right heart. Echo 19  Summary:   1. Double outlet right ventricle with normally related great vessels and a doubly committed VSD      -s/p interventricular tunnel VSD closure, 10/17/85      -s/p resection of RV muscle bundles, 8/10/00.   2. Mild LVOT narrowing secondary to interventricular tunnel repair, peak gradient 28 mmHg, mean gradient 14 mmHg. 3. No aortic valve regurgitation. 4. Left ventricle is normal in size and the systolic function is normal.   5. Mild tricuspid valve regurgitation. 6. Right ventricle is mildly dilated and the systolic function is normal.   7. The ascending aorta, transverse arch and descending aorta are unobstructed. 8. There is no significant pericardial effusion. 9. Compared to the previous echocardiogram of 3/28/2018, there is no longer RV hypertension.     Echo: 3/28/18   Summary:   1. s/p double outlet right ventricle with doubly committed VSD      -s/p interventricular tunnel VSD closure, 10/17/85      -s/p resection of RV muscle bundles, 8/10/00. 2. Compared to the previous echocardiogram of 2/23/2017, there is no significant change. 3. Mild LVOT narrowing secondary to interventricular tunnel repair, peak 23 mmHg, mean 12 mmHg. 4. Trivial pulmonary valve stenosis. 5. Mild tricuspid valve regurgitation. 6. No aortic valve regurgitation. 7. Mild right ventricular hypertension (42 mm Hg if CVP assumed to be 10 mm Hg, SBP of 124 mm Hg). 8. The pulmonary arteries are of good size. 9. Right ventricle is mildly dilated and the systolic function is normal.  10. Left ventricle is normal in size and the systolic function is normal.  11. The ascending aorta, transverse arch and descending aorta are unobstructed. 12. There is no significant pericardial effusion. Medication:  Current Outpatient Medications   Medication Sig Dispense Refill    metoprolol succinate (TOPROL XL) 50 MG extended release tablet Take 1.5 tablets by mouth daily 30 tablet 0    sotalol (BETAPACE) 80 MG tablet TK 1 T PO Q 12 H  2    ASMANEX, 30 METERED DOSES, 220 MCG/INH inhaler INHALE 1 PUFF INTO THE LUNGS DAILY  1    furosemide (LASIX) 20 MG tablet Take 1 tablet by mouth daily as needed (shortness of breath) 10 tablet 0    Prenatal Vit-Fe Fumarate-FA (PREPLUS PO) Take 1 tablet by mouth daily      aspirin 81 MG tablet Take 81 mg by mouth daily      albuterol (PROVENTIL) (2.5 MG/3ML) 0.083% nebulizer solution Take 2.5 mg by nebulization every 6 hours as needed. No current facility-administered medications for this visit.         Assessment and plan:     Dizziness   Episodes are likely to some degree related to drop in BP as per her description    Hydration    Atrial tachycardia  -Symptomatic with these episodes in the past  -On sotalol 80 mg  -Toprol XL 75 mg      Atrial fibrillation and flutter and rapid ventricular response  -No anticoagulation for now  -Will consider ablation in future if frequency and symptoms mandate it and if patient prefers it over medical therapy  - very low burden      Dual chamber PPM 7/1/19  The CIED was interrogated and programmed and I supervised and reviewed all the data. All findings and changes are in device interrogation sheat and reflect my personal interpretation and changes and is scanned to Epic. AP 19.5%,  <0.1%    Syncope/Pause  -No further episodes. Did have presyncope  -Encouraged supportive measures including maintaining hydration  - s/p Permanent pacemaker        Palpitations  -Episodes of symptomatic NSVT noted on her PPM implantation  -Symptoms seem to correlated more with Atrial fibrillation and flutter  See \"dizziness\" above      NSVT   -sotalol and beta-blocker   -Will monitor  -Symptomatic with even short episodes      High grade AV block  -S/p Dual chamber PPM 7/31/19       Congenital Heart disease  -Stable. Followed at [facility]    dyspnea on exertion  -Normal ventricular function     Obesity  - Excessive weight is complicating assessment and treatment. It is placing patient at risk for multiple co-morbidities as well as early death and contributing to the patient's presentation. - discussed weight management with diet and exercise        Plan:   6 month f/u        - The patient is counseled to follow a low salt diet to assure blood pressure remains controlled for cardiovascular risk factor modification.   - The patient is counseled to avoid excess caffeine, and energy drinks as this may exacerbated ectopy and arrhythmia. - The patient is counseled to get regular exercise 3-5 times per week to control cardiovascular risk factors. - The patient is counseled to lose weigt to control cardiovascular risk factors. Thank you for allowing me to participate in the care of Jeffery Sibley. Further evaluation will be based upon the patient's clinical course and testing results.      All questions and concerns were addressed to the patient/family. Alternatives to my treatment were discussed. I have discussed the above stated plan and the patient verbalized understanding and agreed with the plan. NOTE: This report was transcribed using voice recognition software. Every effort was made to ensure accuracy, however, inadvertent computerized transcription errors may be present. Rema Whaley MD, [facility] [address] [facility]   Office: [phone]      Scribe attestation: This note was scribed in the presence of Rema Whaley MD by Silvia Briggs RN    I, Dr. Rema Whaley personally performed the services described in this documentation as scribed by RN in my presence, and it is both accurate and complete.        Kevan Brewer is a 29 y.o. female being evaluated by a Virtual Visit (video visit) encounter to address concerns as mentioned above. A caregiver was present when appropriate. Due to this being a TeleHealth encounter (During GLOZW-22 public health emergency), evaluation of the following organ systems was limited: Vitals/Constitutional/EENT/Resp/CV/GI//MS/Neuro/Skin/Heme-Lymph-Imm. Pursuant to the emergency declaration under the [facility], [address] [place] [address] authority and the [employer] Act, this Virtual Visit was conducted with patient's (and/or legal guardian's) consent, to reduce the patient's risk of exposure to COVID-19 and provide necessary medical care. The patient (and/or legal guardian) has also been advised to contact this office for worsening conditions or problems, and seek emergency medical treatment and/or call 911 if deemed necessary. Patient identification was verified at the start of the visit: Yes    Total time spent for this encounter: Not billed by time    Services were provided through a video synchronous discussion virtually to substitute for in-person clinic visit.  Patient and provider were located at their individual homes. --Rema Whaley MD on 7/16/2020 at 1:40 PM    An electronic signature was used to authenticate this note.

## 2020-09-02 ENCOUNTER — TELEPHONE (OUTPATIENT)
Dept: CARDIOLOGY CLINIC | Age: 35
End: 2020-09-02

## 2020-09-02 NOTE — TELEPHONE ENCOUNTER
She probably needs to contact her PCP regarding the n/v.  [facility] has 3 holds tomorrow may offer one of those

## 2020-09-02 NOTE — TELEPHONE ENCOUNTER
We received remote transmission from patient's monitor at home. Transmission shows normal sensing and pacing function. Current ECG ASVS, NSR. Battery life 13.9 years. 1 NSVT episode on 7/23/2020 lasting 1 second. 225 Fast A and V episodes noted. Last on 9/2/2020, longest 2 minutes, Max A/V 171/171 bpm. EP will review. See interrogation under cardiology tab in the [address] [place] [address] [address] field for more details. Called a spoke with Tracee Bright. She has note bee feeling well since Monday. Her Breathing has been off/SOB, CP yesterday and last night-Heart racing. She is feeling very nauseous. She has not been able to keep anything down since Monday. PCP thought mayeb she needed to go to ER or come in for an ECG. Patient wanted to send a transmission first. Would prefer not to go to ER if unnecessary. Please advise.  Thanks

## 2020-09-02 NOTE — PROGRESS NOTES
[address]   Electrophysiology Virtual visit  Date: 9/3/2020    CC:  Syncope with pause    HPI: Sunshine Brice is a 28 y.o. female with pmh repaired DORV with LV dysfunction. She underwent interventricular tunnel VSD closure in 1985. 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 She developed atrial tachycardia in 2010 and she underwent successful cardioversion. She is referred today for further evaluation of palpitations. She reports she has intermittent palpitations that make her acutely SOB and dizzy, no arrhythmias have been captured by EKG. She reports the episodes last 5-7 minutes in duration. 8/2/19: Insertion of dual chamber pacemaker for CHB    Friday 9/27/19 she had chest pain and went to the ED and they discharged her. Saturday she had episodes of shortness of breath and her heart racing and she felt like she could barely walk. She laid down and rested and the episode eased up. She said when she was driving here she felt light headed. Her OB/GYN told her she could not take Lasix because of the amniotic fluid could be affected. On 10/2/19 she had a hospital admission for asthma related symptoms with treatment including albuterol, prednisone and corticosteroid inhaler. She had episodes of atrial fib/flutter with aRVR. Interval history:  Scott Keene presents to the office via virtual visit. We discussed her rapid heart rates on her device interrogation. She has complaints of nausea that we are unsure if it is causing episodes of tachycardia.     Past Medical History:   Diagnosis Date    Asthma     CHF (congestive heart failure) ([facility]     Congenital heart defect     Migraines, basilar     Palpitations     Sciatica     lower right    VSD (ventricular septal defect and aortic arch hypoplasia         Past Surgical History:   Procedure Laterality Date    CARDIAC SURGERY      ventricular septal defect repair at 4 months    PACEMAKER PLACEMENT  07/31/2019    TONSILLECTOMY      and adnoids removed    VSD REPAIR      baby     Allergies: Allergies   Allergen Reactions    Caffeine Hives and Palpitations    Red Dye Hives and Palpitations       Social History:   reports that she has never smoked. She has never used smokeless tobacco. She reports current alcohol use. She reports that she does not use drugs. Family History:     Reviewed. Denies family history of sudden cardiac death, arrhythmia, premature CAD    Review of System:  All other systems reviewed and are negative except for that noted above. Pertinent negatives are:   General: negative for fever, chills   Ophthalmic ROS: negative for - eye pain or loss of vision  ENT ROS: negative for - headaches, sore throat   Respiratory: negative for - cough, sputum  Cardiovascular: Reviewed in HPI  Gastrointestinal: negative for - abdominal pain, diarrhea, N/V  Hematology: negative for - bleeding, blood clots, bruising or jaundice  Genito-Urinary:  negative for - Dysuria or incontinence  Musculoskeletal: negative for - Joint swelling, muscle pain  Neurological: negative for - confusion, dizziness, headaches   Psychiatric: No anxiety, no depression. Dermatological: negative for - rash    Physical Examination:  There were no vitals filed for this visit. Wt Readings from Last 3 Encounters:   04/10/20 295 lb (133.8 kg)   12/12/19 (!) 320 lb (145.2 kg)   10/02/19 298 lb (135.2 kg)       · Constitutional: Oriented. No distress. · Head: Normocephalic and atraumatic. · Mouth/Throat:    · Eyes: Conjunctivae normal. EOM are normal.   · Neck:     · Cardiovascular:    · Pulmonary/Chest:     · Abdominal:   · Musculoskeletal:    · Lymphadenopathy:    · Neurological: Alert and oriented. · Skin:  No rash noted in visible areas. · Psychiatric: Has a normal behavior     Labs, diagnostic and imaging results reviewed. Reviewed.    Lab Results   Component Value Date    CREATININE <0.5 08/29/2019 CREATININE <0.5 2019    AST 35 2019    ALT 61 2019     EC/3/20    None VV    Echo:  19  Summary   -Normal left ventricle size, wall thickness, and systolic function with an   estimated ejection fraction of 55%. -Abnormal septal motion.   -Trivial mitral regurgitation.   -Patient has a history of a ventricular septal defect and VSD repair.   -Patient has a history of interventricular tunnel repair.   -Patient has a history of interventricular tunnel repair. The aortic valve   is poorly visualized. There is a fixed gradient through the LV outflow area. The peak gradient is estimated at 47 mmHg with a mean pressure gradient of   27 mmHg. It is unclear if the obstruction is due to the VSD repair or if   there is some aortic stenosis. The obstruction is moderate.   -Normal diastolic function. Avg. E/e=8.65   -Bubble study performed with no obvious right to left shunt.   -Pacemaker wire noted in the right heart. Echo 19  Summary:   1. Double outlet right ventricle with normally related great vessels and a doubly committed VSD      -s/p interventricular tunnel VSD closure, 10/17/85      -s/p resection of RV muscle bundles, 8/10/00.   2. Mild LVOT narrowing secondary to interventricular tunnel repair, peak gradient 28 mmHg, mean gradient 14 mmHg. 3. No aortic valve regurgitation. 4. Left ventricle is normal in size and the systolic function is normal.   5. Mild tricuspid valve regurgitation. 6. Right ventricle is mildly dilated and the systolic function is normal.   7. The ascending aorta, transverse arch and descending aorta are unobstructed. 8. There is no significant pericardial effusion. 9. Compared to the previous echocardiogram of 3/28/2018, there is no longer RV hypertension.     Echo: 3/28/18   Summary:   1. s/p double outlet right ventricle with doubly committed VSD      -s/p interventricular tunnel VSD closure, 10/17/85      -s/p resection of RV muscle bundles, 8/10/00. 2. Compared to the previous echocardiogram of 2/23/2017, there is no significant change. 3. Mild LVOT narrowing secondary to interventricular tunnel repair, peak 23 mmHg, mean 12 mmHg. 4. Trivial pulmonary valve stenosis. 5. Mild tricuspid valve regurgitation. 6. No aortic valve regurgitation. 7. Mild right ventricular hypertension (42 mm Hg if CVP assumed to be 10 mm Hg, SBP of 124 mm Hg). 8. The pulmonary arteries are of good size. 9. Right ventricle is mildly dilated and the systolic function is normal.  10. Left ventricle is normal in size and the systolic function is normal.  11. The ascending aorta, transverse arch and descending aorta are unobstructed. 12. There is no significant pericardial effusion. Medication:  Current Outpatient Medications   Medication Sig Dispense Refill    metoprolol succinate (TOPROL XL) 50 MG extended release tablet Take 1.5 tablets by mouth daily 30 tablet 0    sotalol (BETAPACE) 80 MG tablet TK 1 T PO Q 12 H  2    ASMANEX, 30 METERED DOSES, 220 MCG/INH inhaler INHALE 1 PUFF INTO THE LUNGS DAILY  1    aspirin 81 MG tablet Take 81 mg by mouth daily      albuterol (PROVENTIL) (2.5 MG/3ML) 0.083% nebulizer solution Take 2.5 mg by nebulization every 6 hours as needed. No current facility-administered medications for this visit. Assessment and plan:     Nausea     She is describing episodes of stomach upset and nausea since her childbirth. Over the last few days also she has felt to her heart racing faster. I reviewed pacemaker interrogation and there is definitely one episode of SVT that was shortened about a minute. There are several episodes of tachycardia with heart rate of 146 bpm which could be sinus tach or atrial runs. These were right longer. However I cannot differentiated due to the length of the episodes and not seeing the beginning and end of it.      May be caused by her episodes of tachycardia   Discussed increasing her sotalol to 120 mg BID and see if this helps her symptoms   Increase sotalol to 120 mg BID   If this does not help her symptoms we recommend she follow up with her OB and PCP   Hydration     Monitor for sotalol side effects  Palpitations  -Episodes of symptomatic NSVT noted on her PPM implantation in the past  -Symptoms seem to correlated more with Atrial fibrillation and flutter  See \"nausea\" above    Atrial fibrillation and flutter and rapid ventricular response  Atrial tachycardia  -Symptomatic with these episodes in the past  -Increas sotalol to 120 mg BID  -Start the increased dose Friday 9/4/2020 and have an ECG 9/8/2020  -Toprol XL 75 mg  -No anticoagulation for now  -Will consider ablation in future if frequency and symptoms mandate it and if patient prefers it over medical therapy      High grade AV block  -S/p Dual chamber PPM 7/31/19  The CIED was interrogated and programmed and I supervised and reviewed all the data. All findings and changes are in device interrogation sheat and reflect my personal interpretation and changes and is scanned to Epic. AP 1.2%,  0.1% 13.9 years remaining battery    Syncope/Pause  -No further episodes. Did have presyncope  -Encouraged supportive measures including maintaining hydration  - s/p Permanent pacemaker       NSVT   -sotalol and beta-blocker   -Will monitor  -Symptomatic with even short episodes  No recent ones             Congenital Heart disease  -Stable. Followed at [facility]    dyspnea on exertion  -Normal ventricular function     Obesity  - Excessive weight is complicating assessment and treatment. It is placing patient at risk for multiple co-morbidities as well as early death and contributing to the patient's presentation.   - discussed weight management with diet and exercise        Plan:   -Increas sotalol to 120 mg BID  -Start the increased dose Friday 9/4/2020 and have an ECG 9/8/2020        - The patient is counseled to follow a low salt diet to assure blood pressure remains controlled for cardiovascular risk factor modification.   - The patient is counseled to avoid excess caffeine, and energy drinks as this may exacerbated ectopy and arrhythmia. - The patient is counseled to get regular exercise 3-5 times per week to control cardiovascular risk factors. - The patient is counseled to lose weigt to control cardiovascular risk factors. Thank you for allowing me to participate in the care of Virginia Killian. Further evaluation will be based upon the patient's clinical course and testing results. All questions and concerns were addressed to the patient/family. Alternatives to my treatment were discussed. I have discussed the above stated plan and the patient verbalized understanding and agreed with the plan. NOTE: This report was transcribed using voice recognition software. Every effort was made to ensure accuracy, however, inadvertent computerized transcription errors may be present. Russ Cabot, MD, [address]   Office: [phone]    Scribe attestation: This note was scribed in the presence of Russ Cabot, MD by Shannon Daniel RN    I, Dr. Russ Cabot personally performed the services described in this documentation as scribed by RN in my presence, and it is both accurate and complete.        Virginia Killian is a 28 y.o. female being evaluated by a Virtual Visit (video visit) encounter to address concerns as mentioned above. A caregiver was present when appropriate. Due to this being a TeleHealth encounter (During EMSZD-25 public health emergency), evaluation of the following organ systems was limited: Vitals/Constitutional/EENT/Resp/CV/GI//MS/Neuro/Skin/Heme-Lymph-Imm.   Pursuant to the emergency declaration under the [address] waiver authority and the [employer] Act, this Virtual Visit was conducted with patient's (and/or legal guardian's) consent, to reduce the patient's risk of exposure to COVID-19 and provide necessary medical care. The patient (and/or legal guardian) has also been advised to contact this office for worsening conditions or problems, and seek emergency medical treatment and/or call 911 if deemed necessary. Patient identification was verified at the start of the visit: Yes    Total time spent for this encounter: Not billed by time    Services were provided through a video synchronous discussion virtually to substitute for in-person clinic visit. Patient and provider were located at their individual homes. --Marin Joe MD on 9/3/2020 at 8:53 AM    An electronic signature was used to authenticate this note.

## 2020-09-03 ENCOUNTER — VIRTUAL VISIT (OUTPATIENT)
Dept: CARDIOLOGY CLINIC | Age: 35
End: 2020-09-03
Payer: COMMERCIAL

## 2020-09-03 ENCOUNTER — TELEPHONE (OUTPATIENT)
Dept: CARDIOLOGY CLINIC | Age: 35
End: 2020-09-03

## 2020-09-03 PROCEDURE — 99214 OFFICE O/P EST MOD 30 MIN: CPT | Performed by: INTERNAL MEDICINE

## 2020-10-06 ENCOUNTER — TELEPHONE (OUTPATIENT)
Dept: CARDIOLOGY CLINIC | Age: 35
End: 2020-10-06

## 2020-10-06 NOTE — TELEPHONE ENCOUNTER
We received remote transmission from patient's monitor at home. Transmission shows normal sensing and pacing function. Current ECG ASVS- 66 bpm. AP 25.1%.  <0.1%. 2 NSVT episodes noted. Last on 10/2/2020, longest 1 second. 97 Fast A &V episodes noted. Last on 10/5/2020, longest seen 12 minutes. EP physician will review. See interrogation under cardiology tab in the [address] [place] [address] [address] field for more details.

## 2020-10-06 NOTE — TELEPHONE ENCOUNTER
She has been having SOB and palpitations and headache for the past few days. She sent a phone transmission today and would like MXA to look at it and let her know what to do .  She is taking meds as prescribed and will be getting an EKG at [facility] today and will have it sent to [facility]

## 2020-10-06 NOTE — TELEPHONE ENCOUNTER
Pt calling to let MXKIRTI know that she got her EKG done at [address] [place] [address] by Dr Kristy Kayser pls call to advise thank you

## 2020-10-06 NOTE — TELEPHONE ENCOUNTER
Call placed to [facility] and discussed that MEL does not believe her symptoms are cardiac related

## 2020-10-19 ENCOUNTER — NURSE ONLY (OUTPATIENT)
Dept: CARDIOLOGY CLINIC | Age: 35
End: 2020-10-19
Payer: COMMERCIAL

## 2020-10-19 PROCEDURE — 93296 REM INTERROG EVL PM/IDS: CPT | Performed by: INTERNAL MEDICINE

## 2020-10-19 PROCEDURE — 93294 REM INTERROG EVL PM/LDLS PM: CPT | Performed by: INTERNAL MEDICINE

## 2020-10-19 NOTE — LETTER
[address] [facility] [phone]  [address] [facility] [phone]    Pacemaker/Defibrillator Clinic          10/19/20        Selina Carrasco  [address] [facility]         Deahilary Carrasco    This letter is to inform you that we received the transmission from your monitor at home that checks your implanted heart device. The next date your monitor will automatically transmit will be 1-19-21. If your report needs attention we will notify you. Your device and monitor are wireless and most transmit cellularly, but please periodically check your monitor is still plugged in to the electrical outlet. If you still use the telephone land line to send please ensure the connection to the phone ricky is secure. This will help to ensure successful automatic transmissions in the future. Also, the monitor needs to be close to you while sleeping at night. Please be aware that the remote device transmission sites are periodically monitored only during regular business hours during which simultaneous in-office device clinics are being run. If your transmission requires attention, we will contact you as soon as possible. Thank you.             [facility]

## 2020-10-19 NOTE — PROGRESS NOTES
We received remote transmission from patient's monitor at home. Transmission shows normal sensing and pacing function. Pt has AT and NSVT recordings. Pt is on Toprol. EP physician will review. See interrogation under cardiology tab in the [address] [place] [address] [address] field for more details.

## 2020-12-03 ENCOUNTER — TELEPHONE (OUTPATIENT)
Dept: CARDIOLOGY CLINIC | Age: 35
End: 2020-12-03

## 2020-12-03 NOTE — TELEPHONE ENCOUNTER
We received remote transmission from patient's monitor at home. Transmission shows normal sensing and pacing function. Current ECG ASVS at 75 bpm.   Battery life 13.6 years  AP 6.5%.  <0.1%. 4 NSVT episode noted. Last on 11/26/2020, longest 4 seconds. 232 Fast A&V episodes noted. Last on 12/1/2020, longest 2 1/2 minutes  Patient remains on metoprolol and sotalol. EP will review. See interrogation under cardiology tab in the [address] [place] [address] [address] field for more details. Please advise.

## 2020-12-03 NOTE — TELEPHONE ENCOUNTER
Pt calling has been having SOB, Chest Pains, Fluttering, Racing she is going to send a remote transmission now. Needs to know what to do? The racing has been going on 1 to 2 times per week and the CP and SOB started yesterday and into last night.  Pls call to advise Thank you

## 2020-12-23 ENCOUNTER — APPOINTMENT (OUTPATIENT)
Dept: GENERAL RADIOLOGY | Age: 35
End: 2020-12-23
Payer: COMMERCIAL

## 2020-12-23 ENCOUNTER — TELEPHONE (OUTPATIENT)
Dept: CARDIOLOGY CLINIC | Age: 35
End: 2020-12-23

## 2020-12-23 ENCOUNTER — HOSPITAL ENCOUNTER (EMERGENCY)
Age: 35
Discharge: HOME | End: 2020-12-23
Attending: EMERGENCY MEDICINE
Payer: COMMERCIAL

## 2020-12-23 VITALS
HEART RATE: 73 BPM | TEMPERATURE: 98.2 F | OXYGEN SATURATION: 99 % | BODY MASS INDEX: 45.78 KG/M2 | SYSTOLIC BLOOD PRESSURE: 129 MMHG | RESPIRATION RATE: 15 BRPM | WEIGHT: 293 LBS | DIASTOLIC BLOOD PRESSURE: 80 MMHG

## 2020-12-23 LAB
A/G RATIO: 1.2 (ref 1.1–2.2)
ALBUMIN SERPL-MCNC: 4.2 G/DL (ref 3.4–5)
ALP BLD-CCNC: 53 U/L (ref 40–129)
ALT SERPL-CCNC: 10 U/L (ref 10–40)
ANION GAP SERPL CALCULATED.3IONS-SCNC: 10 MMOL/L (ref 3–16)
APTT: 28.6 SEC (ref 24.2–36.2)
AST SERPL-CCNC: 13 U/L (ref 15–37)
BASOPHILS ABSOLUTE: 0 K/UL (ref 0–0.2)
BASOPHILS RELATIVE PERCENT: 0.5 %
BILIRUB SERPL-MCNC: 0.6 MG/DL (ref 0–1)
BUN BLDV-MCNC: 6 MG/DL (ref 7–20)
CALCIUM SERPL-MCNC: 9.4 MG/DL (ref 8.3–10.6)
CHLORIDE BLD-SCNC: 104 MMOL/L (ref 99–110)
CO2: 24 MMOL/L (ref 21–32)
CREAT SERPL-MCNC: 0.6 MG/DL (ref 0.6–1.1)
EKG ATRIAL RATE: 84 BPM
EKG DIAGNOSIS: NORMAL
EKG P AXIS: 39 DEGREES
EKG P-R INTERVAL: 124 MS
EKG Q-T INTERVAL: 410 MS
EKG QRS DURATION: 144 MS
EKG QTC CALCULATION (BAZETT): 484 MS
EKG R AXIS: -10 DEGREES
EKG T AXIS: 12 DEGREES
EKG VENTRICULAR RATE: 84 BPM
EOSINOPHILS ABSOLUTE: 0.1 K/UL (ref 0–0.6)
EOSINOPHILS RELATIVE PERCENT: 1.6 %
GFR AFRICAN AMERICAN: >60
GFR NON-AFRICAN AMERICAN: >60
GLOBULIN: 3.5 G/DL
GLUCOSE BLD-MCNC: 96 MG/DL (ref 70–99)
HCG QUALITATIVE: NEGATIVE
HCT VFR BLD CALC: 34.8 % (ref 36–48)
HEMOGLOBIN: 11.5 G/DL (ref 12–16)
INR BLD: 1.2 (ref 0.86–1.14)
LYMPHOCYTES ABSOLUTE: 1.3 K/UL (ref 1–5.1)
LYMPHOCYTES RELATIVE PERCENT: 24.3 %
MCH RBC QN AUTO: 24.8 PG (ref 26–34)
MCHC RBC AUTO-ENTMCNC: 33 G/DL (ref 31–36)
MCV RBC AUTO: 75.1 FL (ref 80–100)
MONOCYTES ABSOLUTE: 0.4 K/UL (ref 0–1.3)
MONOCYTES RELATIVE PERCENT: 7.3 %
NEUTROPHILS ABSOLUTE: 3.6 K/UL (ref 1.7–7.7)
NEUTROPHILS RELATIVE PERCENT: 66.3 %
PDW BLD-RTO: 13.4 % (ref 12.4–15.4)
PLATELET # BLD: 289 K/UL (ref 135–450)
PMV BLD AUTO: 7.9 FL (ref 5–10.5)
POTASSIUM SERPL-SCNC: 3.8 MMOL/L (ref 3.5–5.1)
PRO-BNP: 179 PG/ML (ref 0–124)
PROTHROMBIN TIME: 13.9 SEC (ref 10–13.2)
RBC # BLD: 4.63 M/UL (ref 4–5.2)
SODIUM BLD-SCNC: 138 MMOL/L (ref 136–145)
TOTAL PROTEIN: 7.7 G/DL (ref 6.4–8.2)
TROPONIN: <0.01 NG/ML
TROPONIN: <0.01 NG/ML
WBC # BLD: 5.5 K/UL (ref 4–11)

## 2020-12-23 PROCEDURE — 93010 ELECTROCARDIOGRAM REPORT: CPT | Performed by: INTERNAL MEDICINE

## 2020-12-23 PROCEDURE — 84703 CHORIONIC GONADOTROPIN ASSAY: CPT

## 2020-12-23 PROCEDURE — 80053 COMPREHEN METABOLIC PANEL: CPT

## 2020-12-23 PROCEDURE — 85730 THROMBOPLASTIN TIME PARTIAL: CPT

## 2020-12-23 PROCEDURE — 85610 PROTHROMBIN TIME: CPT

## 2020-12-23 PROCEDURE — 84484 ASSAY OF TROPONIN QUANT: CPT

## 2020-12-23 PROCEDURE — 93005 ELECTROCARDIOGRAM TRACING: CPT | Performed by: EMERGENCY MEDICINE

## 2020-12-23 PROCEDURE — 6370000000 HC RX 637 (ALT 250 FOR IP): Performed by: PHYSICIAN ASSISTANT

## 2020-12-23 PROCEDURE — 99283 EMERGENCY DEPT VISIT LOW MDM: CPT

## 2020-12-23 PROCEDURE — 85025 COMPLETE CBC W/AUTO DIFF WBC: CPT

## 2020-12-23 PROCEDURE — 83880 ASSAY OF NATRIURETIC PEPTIDE: CPT

## 2020-12-23 PROCEDURE — 6360000002 HC RX W HCPCS: Performed by: PHYSICIAN ASSISTANT

## 2020-12-23 PROCEDURE — 96374 THER/PROPH/DIAG INJ IV PUSH: CPT

## 2020-12-23 PROCEDURE — 71045 X-RAY EXAM CHEST 1 VIEW: CPT

## 2020-12-23 PROCEDURE — 96375 TX/PRO/DX INJ NEW DRUG ADDON: CPT

## 2020-12-23 RX ORDER — PENICILLIN V POTASSIUM 500 MG/1
500 TABLET ORAL 4 TIMES DAILY
Qty: 28 TABLET | Refills: 0 | Status: SHIPPED | OUTPATIENT
Start: 2020-12-23 | End: 2020-12-30

## 2020-12-23 RX ORDER — HYDROCODONE BITARTRATE AND ACETAMINOPHEN 5; 325 MG/1; MG/1
2 TABLET ORAL ONCE
Status: COMPLETED | OUTPATIENT
Start: 2020-12-23 | End: 2020-12-23

## 2020-12-23 RX ORDER — ONDANSETRON 2 MG/ML
4 INJECTION INTRAMUSCULAR; INTRAVENOUS ONCE
Status: COMPLETED | OUTPATIENT
Start: 2020-12-23 | End: 2020-12-23

## 2020-12-23 RX ORDER — HYDROCODONE BITARTRATE AND ACETAMINOPHEN 5; 325 MG/1; MG/1
1 TABLET ORAL EVERY 6 HOURS PRN
Qty: 10 TABLET | Refills: 0 | Status: SHIPPED | OUTPATIENT
Start: 2020-12-23 | End: 2020-12-26

## 2020-12-23 RX ORDER — ONDANSETRON 4 MG/1
4 TABLET, ORALLY DISINTEGRATING ORAL EVERY 8 HOURS PRN
Qty: 20 TABLET | Refills: 0 | Status: SHIPPED | OUTPATIENT
Start: 2020-12-23 | End: 2021-02-26 | Stop reason: ALTCHOICE

## 2020-12-23 RX ORDER — MORPHINE SULFATE 4 MG/ML
4 INJECTION, SOLUTION INTRAMUSCULAR; INTRAVENOUS EVERY 30 MIN PRN
Status: DISCONTINUED | OUTPATIENT
Start: 2020-12-23 | End: 2020-12-23 | Stop reason: HOSPADM

## 2020-12-23 RX ADMIN — MORPHINE SULFATE 4 MG: 4 INJECTION, SOLUTION INTRAMUSCULAR; INTRAVENOUS at 12:06

## 2020-12-23 RX ADMIN — ONDANSETRON 4 MG: 2 INJECTION INTRAMUSCULAR; INTRAVENOUS at 12:05

## 2020-12-23 RX ADMIN — HYDROCODONE BITARTRATE AND ACETAMINOPHEN 2 TABLET: 5; 325 TABLET ORAL at 16:53

## 2020-12-23 ASSESSMENT — PAIN SCALES - GENERAL: PAINLEVEL_OUTOF10: 10

## 2020-12-23 ASSESSMENT — ENCOUNTER SYMPTOMS
RHINORRHEA: 0
DIARRHEA: 0
VOMITING: 0
COUGH: 0
NAUSEA: 0
SHORTNESS OF BREATH: 0
ABDOMINAL PAIN: 0

## 2020-12-23 ASSESSMENT — PAIN DESCRIPTION - LOCATION: LOCATION: CHEST;TEETH

## 2020-12-23 NOTE — ED PROVIDER NOTES
As physician-in-triage, I performed a medical screening history and physical exam on Nyla Jacques. I also cared for and evaluated the patient in conjunction with the ED Advanced Practice Provider. All diagnostic, treatment, and disposition decisions were made by myself in conjunction with the advanced practice provider. For all further details of the patient's emergency department visit, please see the advanced practice provider's documentation. Double outlet right ventricle with doubly committed ventricular septal defect status post repair (1985); Double-chambered right ventricle (RV) after 1st surgery - status post repair (2000); Left ventricular outflow tract narrowing - mild;   Paroxysmal complete AV block;   NSVT (nonsustained ventricular tachycardia); Non-sustained atrial tachycardia (NSAT); Atrial flutter, unspecified type    Chronic dental pain, no leukocytosis, she has no evidence of troponin leak or ACS, no referred cardiac pain, outpatient follow-up with dentistry, oral antibiotics analgesia analgesia provided in the ER. She is return if she is worse or new symptoms.       Impression: Dental pain acute on subacute, atypical chest pain,     Jm Damon MD  45/85/50 1622       Jm Damon MD  82/57/35 [id number]

## 2020-12-23 NOTE — ED PROVIDER NOTES
[address] ENCOUNTER        Pt Name: Jaja Cuellar  MRN: [id number]  [place] 1985  Date of evaluation: 12/23/2020  Provider: Zahida Lieberman PA-C  PCP: Ian Arana MD     I have seen and evaluated this patient with my supervising physician Keanu Nelson MD.    [address] [place] [address]       Chief Complaint   Patient presents with    Dental Pain     Pt was to get her tooth pulled, but dentist wanted 1000 out-of-pocket. Pain returned last night    Shortness of Breath     Pt also started with chest pain last night and SOB while driving in today       HISTORY OF PRESENT ILLNESS   (Location, Timing/Onset, Context/Setting, Quality, Duration, Modifying Factors, Severity, Associated Signs and Symptoms)  Note limiting factors. Jaja Cuellar is a 28 y.o. female presents to the emergency department today for evaluation for dental pain, chest pain and shortness of breath. The patient states that she has been experiencing pain to her left upper tooth and she states that this is actually been ongoing for several months. She states that she is having trouble getting into a dentist as she is a heart patient. Patient states that she had a congenital heart defect, last surgery was when she was 13years of age. She states that she also has a pacemaker in place and is followed by Ibeth Huggins. patient states that over the past couple of days she has had increasing pain to her tooth. She states that it got better with antibiotics. She states that she is no longer on antibiotics. The patient states that her pain to her tooth is sharp, constant and is rated as a 10/10. The patient states that she was driving here due to her dental pain, and she states that she began to experience a midsternal chest pain, and she states that she did have some shortness of breath. The patient appears to be very anxious.   Is that her symptoms could be because of her dental pain, although due to her history she was unsure and she came to the emergency room. The patient at this time denies any shortness of breath most of her pain is to her tooth she has not had any fever chills per no cough or congestion she is not had any abdominal pain, nausea, vomiting or diarrhea. No urinary symptoms. She denies any known contacts with COVID-19. She denies any facial swelling. No voice changes. Nursing Notes were all reviewed and agreed with or any disagreements were addressed in the HPI. REVIEW OF SYSTEMS    (2-9 systems for level 4, 10 or more for level 5)     Review of Systems   Constitutional: Negative for activity change, appetite change, chills and fever. HENT: Positive for dental problem. Negative for congestion and rhinorrhea. Respiratory: Negative for cough and shortness of breath. Cardiovascular: Positive for chest pain. Gastrointestinal: Negative for abdominal pain, diarrhea, nausea and vomiting. Genitourinary: Negative for difficulty urinating, dysuria and hematuria. Positives and Pertinent negatives as per HPI. Except as noted above in the ROS, all other systems were reviewed and negative. PAST MEDICAL HISTORY     Past Medical History:   Diagnosis Date    Asthma     CH[facility]congestive [facility] failure) ([facility]     Congenital heart defect     Migraines, basilar     Palpitations     Sciatica     lower right    VSD (ventricular septal defect and aortic arch hypoplasia          SURGICAL HISTORY     Past Surgical History:   Procedure Laterality Date    CARDIAC SURGERY      ventricular septal defect repair at 4 months    PACEMAKER PLACEMENT  07/31/2019    TONSILLECTOMY      and adnoids removed    VSD REPAIR      baby         CURRENTMEDICATIONS       Previous Medications    ALBUTEROL (PROVENTIL) (2.5 MG/3ML) 0.083% NEBULIZER SOLUTION    Take 2.5 mg by nebulization every 6 hours as needed.     ASMANEX, 30 METERED DOSES, 220 MCG/INH INHALER    INHALE 1 PUFF INTO THE LUNGS DAILY    ASPIRIN 81 MG TABLET    Take 81 mg by mouth daily    METOPROLOL SUCCINATE (TOPROL XL) 50 MG EXTENDED RELEASE TABLET    Take 1.5 tablets by mouth daily    SOTALOL (BETAPACE) 80 MG TABLET    TK 1 T PO Q 12 H         ALLERGIES     Caffeine and Red dye    FAMILYHISTORY       Family History   Problem Relation Age of Onset    No Known Problems Mother     No Known Problems Father           SOCIAL HISTORY       Social History     Tobacco Use    Smoking status: Never Smoker    Smokeless tobacco: Never Used   Substance Use Topics    Alcohol use: Yes     Comment: occ    Drug use: No       SCREENINGS             PHYSICAL EXAM    (up to 7 for level 4, 8 or more for level 5)     ED Triage Vitals [12/23/20 1030]   BP Temp Temp Source Pulse Resp SpO2 Height Weight   (!) 154/90 98.2 °F (36.8 °C) Temporal 89 16 100 % -- (!) 310 lb (140.6 kg)       Physical Exam  Vitals signs and nursing note reviewed. Constitutional:       Appearance: She is well-developed. She is not diaphoretic. HENT:      Head: Normocephalic and atraumatic. Right Ear: External ear normal.      Left Ear: External ear normal.      Nose: Nose normal.      Mouth/Throat:      Mouth: Mucous membranes are moist.      Pharynx: Oropharynx is clear. No posterior oropharyngeal erythema. Comments: Dental carry noted to tooth #13. No dental abscess. No submental or sublingual edema. No overlying facial erythema, warmth or edema. No voice changes. Tolerating oral secretions well  Eyes:      General:         Right eye: No discharge. Left eye: No discharge. Neck:      Musculoskeletal: Normal range of motion and neck supple. Trachea: No tracheal deviation. Cardiovascular:      Rate and Rhythm: Normal rate and regular rhythm. Heart sounds: No murmur. Pulmonary:      Effort: Pulmonary effort is normal. No respiratory distress. Breath sounds: Normal breath sounds. No wheezing or rales.    Abdominal: General: Bowel sounds are normal. There is no distension. Palpations: Abdomen is soft. Tenderness: There is no abdominal tenderness. There is no guarding. Musculoskeletal: Normal range of motion. Skin:     General: Skin is warm and dry. Neurological:      Mental Status: She is alert and oriented to person, place, and time.    Psychiatric:         Behavior: Behavior normal.         DIAGNOSTIC RESULTS   LABS:    Labs Reviewed   CBC WITH AUTO DIFFERENTIAL - Abnormal; Notable for the following components:       Result Value    Hemoglobin 11.5 (*)     Hematocrit 34.8 (*)     MCV 75.1 (*)     MCH 24.8 (*)     All other components within normal limits    Narrative:     Performed at:  [facility] [address]   Phone [phone]   COMPREHENSIVE METABOLIC PANEL - Abnormal; Notable for the following components:    BUN 6 (*)     AST 13 (*)     All other components within normal limits    Narrative:     Performed at:  [facility] [address]   Phone [phone]   PROTIME-INR - Abnormal; Notable for the following components:    Protime 13.9 (*)     INR 1.20 (*)     All other components within normal limits    Narrative:     Performed at:  [facility] [address]   Phone [phone]   BRAIN NATRIURETIC PEPTIDE - Abnormal; Notable for the following components:    Pro- (*)     All other components within normal limits    Narrative:     Performed at:  [facility] [address]   Phone [phone]   TROPONIN    Narrative:     Performed at:  [facility] [address]   Phone [phone]   APTT    Narrative:     Performed at:  [facility] [address] Phone [phone]   HCG, SERUM, QUALITATIVE    Narrative:     Performed at:  [facility]  [address] [facility], [address]   Phone [phone]   TROPONIN    Narrative:     Performed at:  [facility]  [address] [facility], [address]   Phone [phone]       All other labs were within normal range or not returned as of this dictation. EKG: All EKG's are interpreted by the Emergency Department Physician in the absence of a cardiologist.  Please see their note for interpretation of EKG. RADIOLOGY:   Non-plain film images such as CT, Ultrasound and MRI are read by the radiologist. Plain radiographic images are visualized and preliminarily interpreted by the ED Provider with the below findings:        Interpretation per the Radiologist below, if available at the time of this note:    XR CHEST PORTABLE   Final Result   No active cardiopulmonary disease           Xr Chest Portable    Result Date: 12/23/2020  EXAMINATION: ONE XRAY VIEW OF THE CHEST 12/23/2020 11:50 am COMPARISON: 08/28/2019 HISTORY: ORDERING SYSTEM PROVIDED HISTORY: SOB TECHNOLOGIST PROVIDED HISTORY: Reason for exam:->SOB Reason for Exam: Dental Pain (Pt was to get her tooth pulled, but dentist wanted 1000 out-of-pocket. Pain returned last night) Acuity: Unknown Type of Exam: Unknown FINDINGS: There is a left subclavian dual lead pacer. There has been sternotomy. Cardiac silhouette appears enlarged for the patient's age. Pulmonary vasculature within normal limits. Lungs clear.   Costophrenic angles sharp     No active cardiopulmonary disease           PROCEDURES   Unless otherwise noted below, none     Procedures    CRITICAL CARE TIME   N/A    CONSULTS:  None      EMERGENCY DEPARTMENT COURSE and DIFFERENTIAL DIAGNOSIS/MDM:   Vitals:    Vitals:    12/23/20 1030   BP: (!) 154/90   Pulse: 89   Resp: 16   Temp: 98.2 °F (36.8 °C)   TempSrc: Temporal   SpO2: 100% etiology at this time. FINAL IMPRESSION      1. Pain due to dental caries    2. Chest pain, unspecified type          DISPOSITION/PLAN   DISPOSITION Decision To Discharge 12/23/2020 04:20:20 PM      PATIENT REFERREDTO:  Filipe Sexton MD  [facility]  [address]  [phone]    Schedule an appointment as soon as possible for a visit in 2 days      [facility] [address] [address] [phone]  In 1 week        DISCHARGE MEDICATIONS:  New Prescriptions    HYDROCODONE-ACETAMINOPHEN (NORCO) 5-325 MG PER TABLET    Take 1 tablet by mouth every 6 hours as needed for Pain for up to 3 days. MAGIC MOUTHWASH (MIRACLE MOUTHWASH)    Swish and spit 5 mLs 4 times daily as needed for Dental Pain Equal parts 2% lidocaine, dyphenhydramine, antacid.     ONDANSETRON (ZOFRAN ODT) 4 MG DISINTEGRATING TABLET    Take 1 tablet by mouth every 8 hours as needed for Nausea    PENICILLIN V POTASSIUM (VEETID) 500 MG TABLET    Take 1 tablet by mouth 4 times daily for 7 days       DISCONTINUED MEDICATIONS:  Discontinued Medications    No medications on file              (Please note that portions of this note were completed with a voice recognition program.  Efforts were made to edit the dictations but occasionally words are mis-transcribed.)    Leidy Ford PA-C (electronically signed)            Leidy Ford PA-C  12/23/20 4040

## 2020-12-23 NOTE — TELEPHONE ENCOUNTER
Pt calling for MXA she is coming in to [facility] ER, she has a tooth problem that might be infected, she is having chest pains, weakness, SOB and was told to go to ER by her other Dr. John howell

## 2020-12-23 NOTE — ED NOTES
Pt given discharge instructions and prescriptions at this time with verbal understanding.       Barbara Tee RN  12/23/20 1102

## 2020-12-29 ENCOUNTER — TELEPHONE (OUTPATIENT)
Dept: CARDIOLOGY CLINIC | Age: 35
End: 2020-12-29

## 2020-12-29 NOTE — TELEPHONE ENCOUNTER
Seen in ER for heart fluttering. On Thursday she felt like she was going to faint . Last night she had chest pain , SOB , and fluttering . What should do ?

## 2020-12-29 NOTE — TELEPHONE ENCOUNTER
Patient with recurrent chest pain and history of congenital heart disease. Recommend ER and admission for further evaluation.

## 2020-12-29 NOTE — TELEPHONE ENCOUNTER
Called pt about the message below she stated that her cp and sob has not gotten any better per DKW he stated that she needed to go back to the ER with all of the symptoms that she is having. Patient verbalized understanding.

## 2020-12-30 ENCOUNTER — TELEPHONE (OUTPATIENT)
Dept: CARDIOLOGY CLINIC | Age: 35
End: 2020-12-30

## 2021-01-05 ENCOUNTER — TELEPHONE (OUTPATIENT)
Dept: CARDIOLOGY CLINIC | Age: 36
End: 2021-01-05

## 2021-01-05 NOTE — TELEPHONE ENCOUNTER
I called patient to set up a virtual visit for MXA this Friday no answer.  Please schedule between 8-1030 whatever is convenient for her ty

## 2021-01-05 NOTE — TELEPHONE ENCOUNTER
----- Message from Eloina Ledesma MD sent at 1/5/2021 12:55 PM EST -----  Please set up a virtual visit this Friday.   thanks

## 2021-01-06 ENCOUNTER — TELEPHONE (OUTPATIENT)
Dept: CARDIOLOGY CLINIC | Age: 36
End: 2021-01-06

## 2021-01-06 NOTE — TELEPHONE ENCOUNTER
Returned call to [facility] regarding changing her appointment time on Friday.  She can be scheduled at 1030AM.

## 2021-01-08 ENCOUNTER — NURSE ONLY (OUTPATIENT)
Dept: CARDIOLOGY CLINIC | Age: 36
End: 2021-01-08

## 2021-01-08 ENCOUNTER — TELEPHONE (OUTPATIENT)
Dept: CARDIOLOGY CLINIC | Age: 36
End: 2021-01-08

## 2021-01-08 ENCOUNTER — VIRTUAL VISIT (OUTPATIENT)
Dept: CARDIOLOGY CLINIC | Age: 36
End: 2021-01-08
Payer: COMMERCIAL

## 2021-01-08 DIAGNOSIS — I44.30 AV BLOCK: ICD-10-CM

## 2021-01-08 DIAGNOSIS — R00.2 PALPITATION: Primary | ICD-10-CM

## 2021-01-08 DIAGNOSIS — I48.0 PAF (PAROXYSMAL ATRIAL FIBRILLATION) (HCC): ICD-10-CM

## 2021-01-08 DIAGNOSIS — I47.29 NSVT (NONSUSTAINED VENTRICULAR TACHYCARDIA): ICD-10-CM

## 2021-01-08 DIAGNOSIS — R55 SYNCOPE, UNSPECIFIED SYNCOPE TYPE: ICD-10-CM

## 2021-01-08 PROCEDURE — 99214 OFFICE O/P EST MOD 30 MIN: CPT | Performed by: INTERNAL MEDICINE

## 2021-01-08 RX ORDER — SOTALOL HYDROCHLORIDE 80 MG/1
120 TABLET ORAL 2 TIMES DAILY
Qty: 90 TABLET | Refills: 2 | Status: SHIPPED | OUTPATIENT
Start: 2021-01-08 | End: 2022-03-10

## 2021-01-08 NOTE — PROGRESS NOTES
Patient sends manual remote transmission for VV with Dr. Alexandria Jaeger today. All sensing and pacing parameters are within normal range. Current ECG: AS/VS at 90 bpm  Battery life 13.6 years  AP 3.8%.  <0.1%. 1 NSVT episode noted on 1/1/21, lasting 1 second. 11 Fast A&V episodes noted. Last on 1/6/21, longest 1 1/2 minutes. Patient remains on sotalol and metoprolol. Please see interrogation for more detail.

## 2021-01-08 NOTE — PROGRESS NOTES
[address]   Electrophysiology Virtual visit  Date: 1/8/2021    CC:  SOB, palpitations and Chest pain    HPI: Esa Polanco is a 28 y.o. female with pmh repaired DORV with LV dysfunction. She underwent interventricular tunnel VSD closure in 1985. 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 She developed atrial tachycardia in 2010 and she underwent successful cardioversion. She is referred today for further evaluation of palpitations. She reports she has intermittent palpitations that make her acutely SOB and dizzy, no arrhythmias have been captured by EKG. She reports the episodes last 5-7 minutes in duration. 8/2/19: Insertion of dual chamber pacemaker for CHB    Friday 9/27/19 she had chest pain and went to the ED and they discharged her. Saturday she had episodes of shortness of breath and her heart racing and she felt like she could barely walk. She laid down and rested and the episode eased up. She said when she was driving here she felt light headed. Her OB/GYN told her she could not take Lasix because of the amniotic fluid could be affected. On 10/2/19 she had a hospital admission for asthma related symptoms with treatment including albuterol, prednisone and corticosteroid inhaler. She had episodes of atrial fib/flutter with aRVR. 12/23/2020 Presented to the ED with dental pain, chest pain and Shortness of breath      Interval history: Jaylen Bryant presents today for virtual visit. States things are about the same from a cardiac.   Had one episode before varghese that was concerning, during that episode she felt like she was going to fall to the floor and she had to sit down, she also had chest pain and shortness of breath that was relieved with rest.     Past Medical History:   Diagnosis Date    Asthma     [facility]est[facility] failure) ([facility] [address]     Congenital heart defect     Migraines, basilar     Palpitations     Sciatica lower right    VSD (ventricular septal defect and aortic arch hypoplasia         Past Surgical History:   Procedure Laterality Date    CARDIAC SURGERY      ventricular septal defect repair at 4 months    PACEMAKER PLACEMENT  07/31/2019    TONSILLECTOMY      and adnoids removed    VSD REPAIR      baby     Allergies: Allergies   Allergen Reactions    Caffeine Hives and Palpitations    Red Dye Hives and Palpitations       Social History:   reports that she has never smoked. She has never used smokeless tobacco. She reports current alcohol use. She reports that she does not use drugs. Family History:     Reviewed. Denies family history of sudden cardiac death, arrhythmia, premature CAD    Review of System:  All other systems reviewed and are negative except for that noted above. Pertinent negatives are:   General: negative for fever, chills   Ophthalmic ROS: negative for - eye pain or loss of vision  ENT ROS: negative for - headaches, sore throat   Respiratory: negative for - cough, sputum  Cardiovascular: Reviewed in HPI  Gastrointestinal: negative for - abdominal pain, diarrhea, N/V  Hematology: negative for - bleeding, blood clots, bruising or jaundice  Genito-Urinary:  negative for - Dysuria or incontinence  Musculoskeletal: negative for - Joint swelling, muscle pain  Neurological: negative for - confusion, dizziness, headaches   Psychiatric: No anxiety, no depression. Dermatological: negative for - rash    Physical Examination:  There were no vitals filed for this visit. Wt Readings from Last 3 Encounters:   12/23/20 (!) 310 lb (140.6 kg)   04/10/20 295 lb (133.8 kg)   12/12/19 (!) 320 lb (145.2 kg)       · Constitutional: Oriented. No distress. · Head: Normocephalic and atraumatic. · Mouth/Throat:    · Eyes: Conjunctivae normal. EOM are normal.   · Neck:     · Cardiovascular:    · Pulmonary/Chest:     · Abdominal:   · Musculoskeletal:    · Lymphadenopathy:    · Neurological: Alert and oriented. · Skin:  No rash noted in visible areas. · Psychiatric: Has a normal behavior     Labs, diagnostic and imaging results reviewed. Reviewed. Lab Results   Component Value Date    CREATININE 0.6 2020    CREATININE <0.5 2019    AST 13 2020    ALT 10 2020     EC21    None VV    ECHO 2020 [facility]  Summary:     1. Double outlet right ventricle with normally related great vessels and a doubly committed VSD      -s/p interventricular tunnel VSD closure, 10/17/85      -s/p resection of RV muscle bundles, 8/10/00.   2. Mild LVOT narrowing secondary to interventricular tunnel repair, mean gradient 14 mmHg, previously 22 mmHg. 3. Trivial aortic valve regurgitation. 4. Moderate tricuspid valve regurgitation. 5. No right ventricular hypertension. 6. Right ventricle is mildly dilated and the systolic function is normal.   7. Left ventricle is normal in size and the systolic function is normal.   8. There is no significant pericardial effusion. 9. Compared to the previous echocardiogram of 2019, the patient is post-partum and the LVOT gradient has decreased. There is a possible subaortic membrane in the LVOT. Echo:  19  Summary   -Normal left ventricle size, wall thickness, and systolic function with an   estimated ejection fraction of 55%. -Abnormal septal motion.   -Trivial mitral regurgitation.   -Patient has a history of a ventricular septal defect and VSD repair.   -Patient has a history of interventricular tunnel repair.   -Patient has a history of interventricular tunnel repair. The aortic valve   is poorly visualized. There is a fixed gradient through the LV outflow area. The peak gradient is estimated at 47 mmHg with a mean pressure gradient of   27 mmHg. It is unclear if the obstruction is due to the VSD repair or if   there is some aortic stenosis. The obstruction is moderate.   -Normal diastolic function.  Avg. E/e=8.65   -Bubble study performed with no obvious right to left shunt.   -Pacemaker wire noted in the right heart. Echo 7/18/19  Summary:   1. Double outlet right ventricle with normally related great vessels and a doubly committed VSD      -s/p interventricular tunnel VSD closure, 10/17/85      -s/p resection of RV muscle bundles, 8/10/00.   2. Mild LVOT narrowing secondary to interventricular tunnel repair, peak gradient 28 mmHg, mean gradient 14 mmHg. 3. No aortic valve regurgitation. 4. Left ventricle is normal in size and the systolic function is normal.   5. Mild tricuspid valve regurgitation. 6. Right ventricle is mildly dilated and the systolic function is normal.   7. The ascending aorta, transverse arch and descending aorta are unobstructed. 8. There is no significant pericardial effusion. 9. Compared to the previous echocardiogram of 3/28/2018, there is no longer RV hypertension. Medication:  Current Outpatient Medications   Medication Sig Dispense Refill    ondansetron (ZOFRAN ODT) 4 MG disintegrating tablet Take 1 tablet by mouth every 8 hours as needed for Nausea 20 tablet 0    Magic Mouthwash (MIRACLE MOUTHWASH) Swish and spit 5 mLs 4 times daily as needed for Dental Pain Equal parts 2% lidocaine, dyphenhydramine, antacid. 240 mL 0    metoprolol succinate (TOPROL XL) 50 MG extended release tablet Take 1.5 tablets by mouth daily 30 tablet 0    sotalol (BETAPACE) 80 MG tablet TK 1 T PO Q 12 H  2    ASMANEX, 30 METERED DOSES, 220 MCG/INH inhaler INHALE 1 PUFF INTO THE LUNGS DAILY  1    aspirin 81 MG tablet Take 81 mg by mouth daily      albuterol (PROVENTIL) (2.5 MG/3ML) 0.083% nebulizer solution Take 2.5 mg by nebulization every 6 hours as needed. No current facility-administered medications for this visit.         Assessment and plan:     Nausea   No  Further complaints of nausea today    May be caused by her episodes of tachycardia      Hydration       Palpitations  -Episodes of symptomatic exercise 3-5 times per week to control cardiovascular risk factors. - The patient is counseled to lose weigt to control cardiovascular risk factors. Thank you for allowing me to participate in the care of Hua Bear. Further evaluation will be based upon the patient's clinical course and testing results. All questions and concerns were addressed to the patient/family. Alternatives to my treatment were discussed. I have discussed the above stated plan and the patient verbalized understanding and agreed with the plan. NOTE: This report was transcribed using voice recognition software. Every effort was made to ensure accuracy, however, inadvertent computerized transcription errors may be present. Scribe attestation: This note was scribed in the presence of Joseph Gómez MD by Rosalba Ferrara RN    I, Dr. Joseph Gómez personally performed the services described in this documentation as scribed by RN in my presence, and it is both accurate and complete.        Joseph Gómez MD, [address]   Office: [phone]  Hua Bear is a 28 y.o. female being evaluated by a Virtual Visit (video visit) encounter to address concerns as mentioned above. A caregiver was present when appropriate. Due to this being a TeleHealth encounter ([facility]), evaluation of the following organ systems was limited: Vitals/Constitutional/EENT/Resp/CV/GI//MS/Neuro/Skin/Heme-Lymph-Imm. Pursuant to the emergency declaration under the [facility], [address] [place] [address] authority and the [employer] Act, this Virtual Visit was conducted with patient's (and/or legal guardian's) consent, to reduce the patient's risk of exposure to COVID-19 and provide necessary medical care.   The patient (and/or legal guardian) has also been advised to contact this office for worsening conditions or problems, and seek emergency medical treatment and/or call 911 if deemed necessary. Patient identification was verified at the start of the visit: Yes    Total time spent for this encounter: Not billed by time    Services were provided through a video synchronous discussion virtually to substitute for in-person clinic visit. Patient and provider were located at their individual homes. --Oskar Stoner RN on 1/8/2021 at 10:59 AM    An electronic signature was used to authenticate this note.

## 2021-01-19 ENCOUNTER — NURSE ONLY (OUTPATIENT)
Dept: CARDIOLOGY CLINIC | Age: 36
End: 2021-01-19

## 2021-01-19 DIAGNOSIS — I44.2 CHB (COMPLETE HEART BLOCK) (HCC): ICD-10-CM

## 2021-01-19 DIAGNOSIS — Z95.0 PACEMAKER: ICD-10-CM

## 2021-01-19 PROCEDURE — 93294 REM INTERROG EVL PM/LDLS PM: CPT | Performed by: INTERNAL MEDICINE

## 2021-01-19 PROCEDURE — 93296 REM INTERROG EVL PM/IDS: CPT | Performed by: INTERNAL MEDICINE

## 2021-01-19 NOTE — LETTER
[address] [facility] [phone]  Luige Jose [address] [facility] [phone]    Pacemaker/Defibrillator Clinic          01/19/21        Hua Bear  [address] [facility]         Dear Hua Bear    This letter is to inform you that we received the transmission from your monitor at home that checks your implanted heart device. The next date your monitor will automatically transmit will be 4-21-21. If your report needs attention we will notify you. Your device and monitor are wireless and most transmit cellularly, but please periodically check your monitor is still plugged in to the electrical outlet. If you still use the telephone land line to send please ensure the connection to the phone ricky is secure. This will help to ensure successful automatic transmissions in the future. Also, the monitor needs to be close to you while sleeping at night. Please be aware that the remote device transmission sites are periodically monitored only during regular business hours during which simultaneous in-office device clinics are being run. If your transmission requires attention, we will contact you as soon as possible. Thank you.             Jessi 81

## 2021-01-19 NOTE — PROGRESS NOTES
We received remote transmission from patient's monitor at home. Transmission shows normal sensing and pacing function. Noted AT, AF and NSVT. EP physician will review. See interrogation under cardiology tab in the [address] [place] [address] [address] field for more details.

## 2021-02-16 ENCOUNTER — TELEPHONE (OUTPATIENT)
Dept: CARDIOLOGY CLINIC | Age: 36
End: 2021-02-16

## 2021-02-16 NOTE — TELEPHONE ENCOUNTER
We received remote transmission from patient's monitor at home. Transmission shows normal sensing and pacing function. Current ECG ASVS 85-90 bpm.  Battery life 13.5 years  AP 1.9%.  <0.1%. 200 Fast A&V episodes noted. Last on 1/16/2021 2/16/2021 longest 2 1/2 minutes. 5 NSVT episodes noted. Last on 2/5/202, longest 2 seconds. Patient remains on metoprolol and sotalol. Optivol is within normal range. EP will review. See interrogation under cardiology tab in the [address] [place] [address] [address] field for more details. Please advise.  Thanks

## 2021-02-16 NOTE — TELEPHONE ENCOUNTER
Pt said she is having episodes and has sent over a transmission. Asking to review and call to advise.

## 2021-02-16 NOTE — TELEPHONE ENCOUNTER
Discussed with patient who has is having symptoms and near syncope with episodes. She is inquiring about ablation if that would be a good option for her at this point since her symptoms are more frequent. It has been discussed in office before. Will discuss with Dr. Reji Ceja and get back with her later this week. No acute symptoms reported. No medication changes.      Yeyo Rosario, APRN-CNP

## 2021-02-18 ENCOUNTER — TELEPHONE (OUTPATIENT)
Dept: CARDIOLOGY CLINIC | Age: 36
End: 2021-02-18

## 2021-02-18 NOTE — TELEPHONE ENCOUNTER
Spoke with patient. She has been having palpitations since 2/12/2021. She states that normally she has episodes of dizziness and the room is spinning. Most mornings it passes but this time it has not passed. Discussed with  after review of her transmission and episodes she is having do not correlate with her symptoms. She is dizzy constantly worsens when she moves her head or changes positions.  She will go to the ED as recommended by her PCP

## 2021-02-26 ENCOUNTER — APPOINTMENT (OUTPATIENT)
Dept: CT IMAGING | Age: 36
End: 2021-02-26
Payer: COMMERCIAL

## 2021-02-26 ENCOUNTER — TELEPHONE (OUTPATIENT)
Dept: CARDIOLOGY CLINIC | Age: 36
End: 2021-02-26

## 2021-02-26 ENCOUNTER — APPOINTMENT (OUTPATIENT)
Dept: GENERAL RADIOLOGY | Age: 36
End: 2021-02-26
Payer: COMMERCIAL

## 2021-02-26 ENCOUNTER — HOSPITAL ENCOUNTER (OUTPATIENT)
Age: 36
Setting detail: OBSERVATION
Discharge: HOME | End: 2021-02-26
Attending: EMERGENCY MEDICINE
Payer: COMMERCIAL

## 2021-02-26 VITALS
OXYGEN SATURATION: 100 % | WEIGHT: 285 LBS | DIASTOLIC BLOOD PRESSURE: 93 MMHG | HEIGHT: 69 IN | SYSTOLIC BLOOD PRESSURE: 158 MMHG | TEMPERATURE: 97.1 F | HEART RATE: 79 BPM | BODY MASS INDEX: 42.21 KG/M2 | RESPIRATION RATE: 16 BRPM

## 2021-02-26 DIAGNOSIS — R00.2 PALPITATIONS: ICD-10-CM

## 2021-02-26 DIAGNOSIS — R42 DIZZINESS: ICD-10-CM

## 2021-02-26 DIAGNOSIS — R07.9 ACUTE CHEST PAIN: Primary | ICD-10-CM

## 2021-02-26 LAB
A/G RATIO: 1.2 (ref 1.1–2.2)
ALBUMIN SERPL-MCNC: 4.1 G/DL (ref 3.4–5)
ALP BLD-CCNC: 53 U/L (ref 40–129)
ALT SERPL-CCNC: 12 U/L (ref 10–40)
ANION GAP SERPL CALCULATED.3IONS-SCNC: 7 MMOL/L (ref 3–16)
APTT: 28.2 SEC (ref 24.2–36.2)
AST SERPL-CCNC: 12 U/L (ref 15–37)
BASOPHILS ABSOLUTE: 0.1 K/UL (ref 0–0.2)
BASOPHILS RELATIVE PERCENT: 0.9 %
BILIRUB SERPL-MCNC: 0.4 MG/DL (ref 0–1)
BUN BLDV-MCNC: 9 MG/DL (ref 7–20)
CALCIUM SERPL-MCNC: 9 MG/DL (ref 8.3–10.6)
CHLORIDE BLD-SCNC: 102 MMOL/L (ref 99–110)
CO2: 25 MMOL/L (ref 21–32)
CREAT SERPL-MCNC: 0.6 MG/DL (ref 0.6–1.1)
D DIMER: 629 NG/ML DDU (ref 0–229)
EKG ATRIAL RATE: 77 BPM
EKG DIAGNOSIS: NORMAL
EKG P AXIS: 39 DEGREES
EKG P-R INTERVAL: 152 MS
EKG Q-T INTERVAL: 426 MS
EKG QRS DURATION: 142 MS
EKG QTC CALCULATION (BAZETT): 482 MS
EKG R AXIS: -11 DEGREES
EKG T AXIS: 9 DEGREES
EKG VENTRICULAR RATE: 77 BPM
EOSINOPHILS ABSOLUTE: 0.3 K/UL (ref 0–0.6)
EOSINOPHILS RELATIVE PERCENT: 4.9 %
GFR AFRICAN AMERICAN: >60
GFR NON-AFRICAN AMERICAN: >60
GLOBULIN: 3.5 G/DL
GLUCOSE BLD-MCNC: 105 MG/DL (ref 70–99)
HCG QUALITATIVE: NEGATIVE
HCT VFR BLD CALC: 36.4 % (ref 36–48)
HEMOGLOBIN: 11.4 G/DL (ref 12–16)
INR BLD: 1.1 (ref 0.86–1.14)
LYMPHOCYTES ABSOLUTE: 1.4 K/UL (ref 1–5.1)
LYMPHOCYTES RELATIVE PERCENT: 26.1 %
MCH RBC QN AUTO: 23.3 PG (ref 26–34)
MCHC RBC AUTO-ENTMCNC: 31.2 G/DL (ref 31–36)
MCV RBC AUTO: 74.7 FL (ref 80–100)
MONOCYTES ABSOLUTE: 0.4 K/UL (ref 0–1.3)
MONOCYTES RELATIVE PERCENT: 7.9 %
NEUTROPHILS ABSOLUTE: 3.4 K/UL (ref 1.7–7.7)
NEUTROPHILS RELATIVE PERCENT: 60.2 %
PDW BLD-RTO: 13.9 % (ref 12.4–15.4)
PLATELET # BLD: 293 K/UL (ref 135–450)
PMV BLD AUTO: 7.8 FL (ref 5–10.5)
POTASSIUM SERPL-SCNC: 3.9 MMOL/L (ref 3.5–5.1)
PRO-BNP: 245 PG/ML (ref 0–124)
PROTHROMBIN TIME: 12.8 SEC (ref 10–13.2)
RBC # BLD: 4.88 M/UL (ref 4–5.2)
SODIUM BLD-SCNC: 134 MMOL/L (ref 136–145)
TOTAL PROTEIN: 7.6 G/DL (ref 6.4–8.2)
TROPONIN: <0.01 NG/ML
WBC # BLD: 5.6 K/UL (ref 4–11)

## 2021-02-26 PROCEDURE — U0003 INFECTIOUS AGENT DETECTION BY NUCLEIC ACID (DNA OR RNA); SEVERE ACUTE RESPIRATORY SYNDROME CORONAVIRUS 2 (SARS-COV-2) (CORONAVIRUS DISEASE [COVID-19]), AMPLIFIED PROBE TECHNIQUE, MAKING USE OF HIGH THROUGHPUT TECHNOLOGIES AS DESCRIBED BY CMS-2020-01-R: HCPCS

## 2021-02-26 PROCEDURE — 85610 PROTHROMBIN TIME: CPT

## 2021-02-26 PROCEDURE — G0378 HOSPITAL OBSERVATION PER HR: HCPCS

## 2021-02-26 PROCEDURE — 36415 COLL VENOUS BLD VENIPUNCTURE: CPT

## 2021-02-26 PROCEDURE — 71045 X-RAY EXAM CHEST 1 VIEW: CPT

## 2021-02-26 PROCEDURE — 93005 ELECTROCARDIOGRAM TRACING: CPT | Performed by: EMERGENCY MEDICINE

## 2021-02-26 PROCEDURE — 84484 ASSAY OF TROPONIN QUANT: CPT

## 2021-02-26 PROCEDURE — 83880 ASSAY OF NATRIURETIC PEPTIDE: CPT

## 2021-02-26 PROCEDURE — 6360000004 HC RX CONTRAST MEDICATION: Performed by: PHYSICIAN ASSISTANT

## 2021-02-26 PROCEDURE — 80053 COMPREHEN METABOLIC PANEL: CPT

## 2021-02-26 PROCEDURE — 85730 THROMBOPLASTIN TIME PARTIAL: CPT

## 2021-02-26 PROCEDURE — 85025 COMPLETE CBC W/AUTO DIFF WBC: CPT

## 2021-02-26 PROCEDURE — 85379 FIBRIN DEGRADATION QUANT: CPT

## 2021-02-26 PROCEDURE — 6370000000 HC RX 637 (ALT 250 FOR IP): Performed by: PHYSICIAN ASSISTANT

## 2021-02-26 PROCEDURE — 93010 ELECTROCARDIOGRAM REPORT: CPT | Performed by: INTERNAL MEDICINE

## 2021-02-26 PROCEDURE — 84703 CHORIONIC GONADOTROPIN ASSAY: CPT

## 2021-02-26 PROCEDURE — 71260 CT THORAX DX C+: CPT

## 2021-02-26 PROCEDURE — 99283 EMERGENCY DEPT VISIT LOW MDM: CPT

## 2021-02-26 RX ORDER — SOTALOL HYDROCHLORIDE 80 MG/1
120 TABLET ORAL 2 TIMES DAILY
Status: CANCELLED | OUTPATIENT
Start: 2021-02-26

## 2021-02-26 RX ORDER — ASPIRIN 81 MG/1
162 TABLET, CHEWABLE ORAL ONCE
Status: COMPLETED | OUTPATIENT
Start: 2021-02-26 | End: 2021-02-26

## 2021-02-26 RX ORDER — ACETAMINOPHEN 500 MG
500 TABLET ORAL ONCE
Status: COMPLETED | OUTPATIENT
Start: 2021-02-26 | End: 2021-02-26

## 2021-02-26 RX ORDER — ALBUTEROL SULFATE 2.5 MG/3ML
2.5 SOLUTION RESPIRATORY (INHALATION) EVERY 6 HOURS PRN
Status: CANCELLED | OUTPATIENT
Start: 2021-02-26

## 2021-02-26 RX ORDER — POLYETHYLENE GLYCOL 3350 17 G/17G
17 POWDER, FOR SOLUTION ORAL DAILY PRN
Status: CANCELLED | OUTPATIENT
Start: 2021-02-26

## 2021-02-26 RX ORDER — METOCLOPRAMIDE 10 MG/1
10 TABLET ORAL ONCE
Status: COMPLETED | OUTPATIENT
Start: 2021-02-26 | End: 2021-02-26

## 2021-02-26 RX ORDER — ONDANSETRON 2 MG/ML
4 INJECTION INTRAMUSCULAR; INTRAVENOUS EVERY 6 HOURS PRN
Status: CANCELLED | OUTPATIENT
Start: 2021-02-26

## 2021-02-26 RX ORDER — PROMETHAZINE HYDROCHLORIDE 25 MG/1
12.5 TABLET ORAL EVERY 6 HOURS PRN
Status: CANCELLED | OUTPATIENT
Start: 2021-02-26

## 2021-02-26 RX ORDER — SODIUM CHLORIDE 0.9 % (FLUSH) 0.9 %
10 SYRINGE (ML) INJECTION EVERY 12 HOURS SCHEDULED
Status: CANCELLED | OUTPATIENT
Start: 2021-02-26

## 2021-02-26 RX ORDER — ACETAMINOPHEN 650 MG/1
650 SUPPOSITORY RECTAL EVERY 6 HOURS PRN
Status: CANCELLED | OUTPATIENT
Start: 2021-02-26

## 2021-02-26 RX ORDER — ACETAMINOPHEN 325 MG/1
650 TABLET ORAL EVERY 6 HOURS PRN
Status: CANCELLED | OUTPATIENT
Start: 2021-02-26

## 2021-02-26 RX ORDER — SODIUM CHLORIDE 0.9 % (FLUSH) 0.9 %
10 SYRINGE (ML) INJECTION PRN
Status: CANCELLED | OUTPATIENT
Start: 2021-02-26

## 2021-02-26 RX ADMIN — IOPAMIDOL 75 ML: 755 INJECTION, SOLUTION INTRAVENOUS at 14:47

## 2021-02-26 RX ADMIN — NITROGLYCERIN 1 INCH: 20 OINTMENT TOPICAL at 12:49

## 2021-02-26 RX ADMIN — METOCLOPRAMIDE 10 MG: 10 TABLET ORAL at 12:46

## 2021-02-26 RX ADMIN — ACETAMINOPHEN 500 MG: 500 TABLET, FILM COATED ORAL at 15:24

## 2021-02-26 RX ADMIN — ASPIRIN 162 MG: 81 TABLET, CHEWABLE ORAL at 12:46

## 2021-02-26 ASSESSMENT — ENCOUNTER SYMPTOMS
BACK PAIN: 0
DIARRHEA: 0
COLOR CHANGE: 0
COUGH: 1
CONSTIPATION: 0
STRIDOR: 0
SHORTNESS OF BREATH: 1
VOMITING: 0
ABDOMINAL PAIN: 0
NAUSEA: 0
WHEEZING: 0
ABDOMINAL DISTENTION: 0

## 2021-02-26 ASSESSMENT — PAIN SCALES - GENERAL: PAINLEVEL_OUTOF10: 5

## 2021-02-26 ASSESSMENT — PAIN DESCRIPTION - PAIN TYPE: TYPE: ACUTE PAIN

## 2021-02-26 NOTE — Clinical Note
Patient Class: Observation [104]   REQUIRED: Diagnosis: Chest pain [125452]   Estimated Length of Stay: Estimated stay of less than 2 midnights   Telemetry Bed Required?: Yes

## 2021-02-26 NOTE — ED PROVIDER NOTES
[facility]      Pt Name: Yao Fontanez  MRN: [id number]  [place] 1985  Date of evaluation: 2/26/2021  Provider: Lazarus Batter, MD  I independently performed a history and physical on Yao Fontanez. All diagnostic, treatment, and disposition decisions were made by myself in conjunction with the advanced practice provider. HPI: Yao Fontanez presented with   Chief Complaint   Patient presents with    Dizziness     Pt reporting dizziness, palpitations, CP, and SOB with tingling in left hand. Yao Fontanez has a past medical history of Asthma, CHF (congestive heart failure) ([facility] [place] [address] Congenital heart defect, Migraines, basilar, Palpitations, Sciatica, and VSD (ventricular septal defect and aortic arch hypoplasia. She has a past surgical history that includes VSD repair; Cardiac surgery; pacemaker placement (07/31/2019); and Tonsillectomy. No current facility-administered medications on file prior to encounter. Current Outpatient Medications on File Prior to Encounter   Medication Sig Dispense Refill    sotalol (BETAPACE) 80 MG tablet Take 1.5 tablets by mouth 2 times daily 90 tablet 2    metoprolol succinate (TOPROL XL) 50 MG extended release tablet Take 1.5 tablets by mouth daily 30 tablet 0    aspirin 81 MG tablet Take 81 mg by mouth daily       PHYSICAL EXAM  Vitals: BP (!) 158/93   Pulse 79   Temp 97.1 °F (36.2 °C)   Resp 16   Ht 5' 9\" (1.753 m)   Wt 285 lb (129.3 kg)   SpO2 100%   BMI 42.09 kg/m²   Constitutional:  28 y.o. female alert  HENT:  Atraumatic, oral mucosa moist  Neck:  No visible JVD, supple  Chest/Lungs:  Respiratory effort normal, clear, regular  Abdomen:  Non-distended, soft, NT  Back:  No gross deformity  Extremities:  Normal tone and perfusion  Neurologic:  Alert, speech normal, mentation normal, pupils equal, normal coordination of extremities, no facial asymmetry     Medical Decision Making and Plan: Briefly, this is an 28 y. o.female who presented with concern for dizziness, chest pain, palpitations intermittently. Hx of VSD repair, pacemaker placement in 7/2019. Diagnostic results as below. Plan is to admit for further care. For further details of [facility] encounter, please see documentation by advanced practice provider FOSTER Bowens.     Labs Reviewed   CBC WITH AUTO DIFFERENTIAL - Abnormal; Notable for the following components:       Result Value    Hemoglobin 11.4 (*)     MCV 74.7 (*)     MCH 23.3 (*)     All other components within normal limits    Narrative:     Performed at:  [facility] [address]   Phone [phone]   COMPREHENSIVE METABOLIC PANEL - Abnormal; Notable for the following components:    Sodium 134 (*)     Glucose 105 (*)     AST 12 (*)     All other components within normal limits    Narrative:     Performed at:  [facility] [address]   Phone [phone]   BRAIN NATRIURETIC PEPTIDE - Abnormal; Notable for the following components:    Pro- (*)     All other components within normal limits    Narrative:     Performed at:  [facility] [address] [place]   Phone [phone]   D-DIMER, QUANTITATIVE - Abnormal; Notable for the following components:    D-Dimer, Quant 629 (*)     All other components within normal limits    Narrative:     Performed at:  [facility] [address] [place]   Phone [phone]   TROPONIN    Narrative:     Performed at:  [facility] [address] [place]   Phone [phone]   HCG, SERUM, QUALITATIVE    Narrative:     Performed at:  [facility] [address] [place]   Phone [phone]   PROTIME-INR    Narrative:     Performed at:  [facility]  [address] [facility] [address]   Phone [phone]   APTT    Narrative:     Performed at:  [facility]  [address], [address]   Phone [phone]   URINE RT REFLEX TO CULTURE   COVID-19   COVID-19   COVID-19   COVID-19     RADIOLOGY:     Plain x-rays were viewed by me:   CT CHEST PULMONARY EMBOLISM W CONTRAST   Final Result   No evidence of pulmonary embolism or acute pulmonary abnormality. Prominent size of the main pulmonary artery, likely related to mild pulmonary   hypertension. XR CHEST PORTABLE   Preliminary Result   No evidence of acute cardiopulmonary disease with no significant change in   appearance of the chest since examination of 12/23/2020. EKG:  Read by me in the absence of a cardiologist shows: Sinus rhythm, rate 77, right bundle branch block, repolarization abnormalities from conduction delay, axis -11 degrees, prior EKG not currently available    Medications administered:  Medications   metoclopramide (REGLAN) tablet 10 mg (10 mg Oral Given 2/26/21 1246)   aspirin chewable tablet 162 mg (162 mg Oral Given 2/26/21 1246)   nitroglycerin (NITRO-BID) 2 % ointment 1 inch (1 inch Topical Given 2/26/21 1249)   iopamidol (ISOVUE-370) 76 % injection 75 mL (75 mLs Intravenous Given 2/26/21 1447)   acetaminophen (TYLENOL) tablet 500 mg (500 mg Oral Given 2/26/21 1524)     FINAL IMPRESSION:    1. Acute chest pain    2. Dizziness    3.  Palpitations       DISPOSITION Admitted 02/26/2021 04:04:59 PM        Amelia Johnson MD  02/26/21 3088

## 2021-02-26 NOTE — ED NOTES
Pharmacy Medication History Note      List of current medications patient is taking is complete. Source of information: Sheela Jacobo 20 made to medication list:  Medications flagged for removal (include reason, ex. noncompliance):  none    Medications removed (include reason, ex. therapy complete or physician discontinued):  See list below- therapy completed    Medications added/doses adjusted:  none    Other notes (ex. Recent course of antibiotics, Coumadin dosing):  Patient states medication compliance, however no fill history exists since April 2020. Denies use of other OTC or herbal medications. Last dose times updated. Jaida Weaver, PharmD  ED Pharmacist [id number]  2/26/2021    No current facility-administered medications on file prior to encounter. Current Outpatient Medications on File Prior to Encounter   Medication Sig Dispense Refill    sotalol (BETAPACE) 80 MG tablet Take 1.5 tablets by mouth 2 times daily 90 tablet 2    [DISCONTINUED] ondansetron (ZOFRAN ODT) 4 MG disintegrating tablet Take 1 tablet by mouth every 8 hours as needed for Nausea 20 tablet 0    [DISCONTINUED] Magic Mouthwash (MIRACLE MOUTHWASH) Swish and spit 5 mLs 4 times daily as needed for Dental Pain Equal parts 2% lidocaine, dyphenhydramine, antacid. 240 mL 0    metoprolol succinate (TOPROL XL) 50 MG extended release tablet Take 1.5 tablets by mouth daily 30 tablet 0    [DISCONTINUED] ASMANEX, 30 METERED DOSES, 220 MCG/INH inhaler INHALE 1 PUFF INTO THE LUNGS DAILY  1    aspirin 81 MG tablet Take 81 mg by mouth daily      [DISCONTINUED] albuterol (PROVENTIL) (2.5 MG/3ML) 0.083% nebulizer solution Take 2.5 mg by nebulization every 6 hours as needed.

## 2021-02-26 NOTE — TELEPHONE ENCOUNTER
Valeri Patel RN, hospital cardiac nurse, of the [facility] pt on her way to the ER.

## 2021-02-26 NOTE — ED NOTES
Patient wishes to leave against medical advice at this time. Physician aware and no new orders received at this time,  Patient informed that they are leaving against the advice of the attending physician, and that they have been informed of the risk involved in leaving and hereby release the attending physician and [facility]) from all responsibility for any ill effects which may results from such departure. Patient informed may return at any time for continued for further treatment, evaluation or deterioration of disposition. Patient DID  Sign AMA form and paper copy maintained for medical records. Pt upset that she did have prescriptions to be sent home with her. I explained that she would normally get these at the end of her visit with d/c paperwork. She was not happy with this situation. Pt was upset that she was being admitted to a COVID floor. Offered rapid COVID test as an option to then be admitted to another floor. Pt refused and stated she was certain that she does not have COVID.           Rebekah Shah, RN  02/26/21 8752

## 2021-02-26 NOTE — ED PROVIDER NOTES
[facility]        Pt Name: Jeri Munoz  MRN: [id number]  [place] 1985  Date of evaluation: 2/26/2021  Provider: Yadira Bernard PA-C  PCP: Martha Cheung MD     I have seen and evaluated this patient with my supervising physician Paty Barkley, *. CHIEF COMPLAINT       Chief Complaint   Patient presents with    Dizziness     Pt reporting dizziness, palpitations, CP, and SOB with tingling in left hand. HISTORY OF PRESENT ILLNESS   (Location, Timing/Onset, Context/Setting, Quality, Duration, Modifying Factors, Severity, Associated Signs and Symptoms)  Note limiting factors. Jeri Munoz is a 28 y.o. female who presents to the emergency department complaining of intermittent dizziness since last week Wednesday. It is typically elicited with head movement. For 2 days, patient reports nasal congestion. Today, reports productive cough. This afternoon she developed midsternal chest tightness/pressure with shortness of breath and palpitations and left hand tingling. She does have a Medtronic pacemaker. She has a history of heart failure, atrial fibrillation/atrial flutter, and cardiomyopathy. Her Medtronic pacemaker was interrogated recently. Dr. Viviane Ray is her cardiologist.  She takes baby aspirin, sotalol and metoprolol. Nursing Notes were all reviewed and agreed with or any disagreements were addressed in the HPI. REVIEW OF SYSTEMS    (2-9 systems for level 4, 10 or more for level 5)     Review of Systems   Constitutional: Negative for chills and fever. HENT: Negative. Eyes: Negative for visual disturbance. Respiratory: Positive for cough and shortness of breath. Negative for wheezing and stridor. Cardiovascular: Positive for chest pain and palpitations. Negative for leg swelling.    Gastrointestinal: Negative for abdominal distention, abdominal pain, constipation, diarrhea, nausea and dye    FAMILYHISTORY       Family History   Problem Relation Age of Onset    No Known Problems Mother     No Known Problems Father           SOCIAL HISTORY       Social History     Tobacco Use    Smoking status: Never Smoker    Smokeless tobacco: Never Used   Substance Use Topics    Alcohol use: Yes     Comment: occ    Drug use: No       SCREENINGS             PHYSICAL EXAM    (up to 7 for level 4, 8 or more for level 5)     ED Triage Vitals [02/26/21 1216]   BP Temp Temp src Pulse Resp SpO2 Height Weight   (!) 158/93 97.1 °F (36.2 °C) -- 79 16 100 % 5' 9\" (1.753 m) 285 lb (129.3 kg)       Physical Exam  Vitals signs and nursing note reviewed. Constitutional:       Appearance: Normal appearance. She is well-developed. She is obese. She is not toxic-appearing or diaphoretic. HENT:      Head: Normocephalic and atraumatic. Right Ear: External ear normal.      Left Ear: External ear normal.      Nose: Congestion present. Mouth/Throat:      Mouth: Mucous membranes are moist.      Pharynx: Oropharynx is clear. Eyes:      General: No scleral icterus. Right eye: No discharge. Left eye: No discharge. Extraocular Movements: Extraocular movements intact. Conjunctiva/sclera: Conjunctivae normal.      Pupils: Pupils are equal, round, and reactive to light. Neck:      Musculoskeletal: Normal range of motion. Cardiovascular:      Rate and Rhythm: Normal rate. Pulmonary:      Effort: Pulmonary effort is normal.      Breath sounds: Normal breath sounds. Abdominal:      General: Bowel sounds are normal.      Palpations: Abdomen is soft. Tenderness: There is no abdominal tenderness. Musculoskeletal: Normal range of motion. Comments: No acute extremity edema, posterior calf or thigh tenderness, palpable cord. Negative Homans. Skin:     General: Skin is warm and dry. Capillary Refill: Capillary refill takes less than 2 seconds.       Coloration: Skin is not jaundiced or pale. Findings: No bruising, erythema, lesion or rash. Neurological:      General: No focal deficit present. Mental Status: She is alert and oriented to person, place, and time. Comments: No pronator drift, facial droop or slurred speech. Normal finger to nose coordination. Normal rapid alternating hand movement. Normal heel to shin coordination   Birdie Hallpike negative without nystagmus. 5 out of 5 strength in all 4 extremities without focal weakness, paresthesia or radiculopathy.    Psychiatric:         Mood and Affect: Mood normal.         Behavior: Behavior normal.         DIAGNOSTIC RESULTS   LABS:    Labs Reviewed   CBC WITH AUTO DIFFERENTIAL - Abnormal; Notable for the following components:       Result Value    Hemoglobin 11.4 (*)     MCV 74.7 (*)     MCH 23.3 (*)     All other components within normal limits    Narrative:     Performed at:  [facility] [address], [place]   Phone [phone]   COMPREHENSIVE METABOLIC PANEL - Abnormal; Notable for the following components:    Sodium 134 (*)     Glucose 105 (*)     AST 12 (*)     All other components within normal limits    Narrative:     Performed at:  [facility] [address], [place], [address]   Phone [phone]   BRAIN NATRIURETIC PEPTIDE - Abnormal; Notable for the following components:    Pro- (*)     All other components within normal limits    Narrative:     Performed at:  [facility] [address], [place], [address]   Phone [phone]   D-DIMER, QUANTITATIVE - Abnormal; Notable for the following components:    D-Dimer, Quant 629 (*)     All other components within normal limits    Narrative:     Performed at:  [facility] [address], [place], [address]   Phone [phone]   TROPONIN    Narrative:     Performed at:  [facility]) - Patient was given the following medications:  Medications   metoclopramide (REGLAN) tablet 10 mg (10 mg Oral Given 2/26/21 1246)   aspirin chewable tablet 162 mg (162 mg Oral Given 2/26/21 1246)   nitroglycerin (NITRO-BID) 2 % ointment 1 inch (1 inch Topical Given 2/26/21 1249)   iopamidol (ISOVUE-370) 76 % injection 75 mL (75 mLs Intravenous Given 2/26/21 1447)   acetaminophen (TYLENOL) tablet 500 mg (500 mg Oral Given 2/26/21 1524)           This patient presents to the emergency department complaining of intermittent dizziness, left hand tingling, chest pain and palpitations with shortness of breath. She does have significant heart history and therefore I do feel admission is warranted for further evaluation and potentially cardiology consultation. Blood work is stable at this time. Troponin negative. CT scan of the chest shows no evidence of acute PE however does show findings consistent with pulmonary hypertension. Patient understands and agrees with plan for admission. My suspicion is low for carotid dissection, sinus abscess, acute fracture, acute CVA, ICH, SAH, TIA, meningitis, encephalitis, pseudotumor cerebri, temporal arteritis, sentinel bleed from ruptured aneurysm, hypertensive urgency or emergency, subdural hematoma, epidural hematoma, cerebellar compromise, posterior stroke, pneumonia, pneumothorax, PE, myocarditis, pericarditis, endocarditis, acute pulmonary edema, pleural effusion, pericardial effusion, cardiac tamponade, CHF exacerbation, thoracic aortic dissection, esophageal rupture, other life-threatening arrhythmia, hypertensive urgency or emergency, hemothorax, pulmonary contusion, subcutaneous emphysema, flail chest, pneumo mediastinum, rib fracture, pneumonia, pneumothorax, or other concerning pathology. covid 19 swab results pending. FINAL IMPRESSION      1. Acute chest pain    2. Dizziness    3.  Palpitations          DISPOSITION/PLAN   DISPOSITION Decision To Admit 02/26/2021 03:50:51 PM      PATIENT REFERREDTO:  No follow-up provider specified.     DISCHARGE MEDICATIONS:  New Prescriptions    No medications on file       DISCONTINUED MEDICATIONS:  Discontinued Medications    No medications on file              (Please note that portions of this note were completed with a voice recognition program.  Efforts were made to edit the dictations but occasionally words are mis-transcribed.)    Rosalba Davis PA-C (electronically signed)            Rosalba Davis PA-C  02/26/21 0543

## 2021-02-26 NOTE — TELEPHONE ENCOUNTER
Pt calling she is on her way to the ER with chest pain shortness of breath and tingle in her hands pls call to advise thank you

## 2021-02-26 NOTE — ED PROVIDER NOTES
EKG is reviewed myself. Dated today at 1218. Rate 77. Sinus rhythm. Right bundle branch block. No significant change from 23 December 2020.      Hever Norton MD  02/26/21 1026

## 2021-02-27 LAB — SARS-COV-2, PCR: NOT DETECTED

## 2021-03-25 ENCOUNTER — TELEPHONE (OUTPATIENT)
Dept: CARDIOLOGY CLINIC | Age: 36
End: 2021-03-25

## 2021-03-25 NOTE — TELEPHONE ENCOUNTER
Vikash calling from [employer] [facility] asking for pt pacemaker settings faxed to [phone] pls call to advise thank you

## 2021-05-26 ENCOUNTER — NURSE ONLY (OUTPATIENT)
Dept: CARDIOLOGY CLINIC | Age: 36
End: 2021-05-26

## 2021-05-26 ENCOUNTER — TELEPHONE (OUTPATIENT)
Dept: CARDIOLOGY CLINIC | Age: 36
End: 2021-05-26

## 2021-05-26 DIAGNOSIS — Z95.0 PACEMAKER: ICD-10-CM

## 2021-05-26 DIAGNOSIS — I44.2 CHB (COMPLETE HEART BLOCK) (HCC): ICD-10-CM

## 2021-05-26 PROCEDURE — 93296 REM INTERROG EVL PM/IDS: CPT | Performed by: INTERNAL MEDICINE

## 2021-05-26 PROCEDURE — 93294 REM INTERROG EVL PM/LDLS PM: CPT | Performed by: INTERNAL MEDICINE

## 2021-05-26 RX ORDER — METOPROLOL SUCCINATE 100 MG/1
100 TABLET, EXTENDED RELEASE ORAL DAILY
Qty: 90 TABLET | Refills: 3 | Status: SHIPPED | OUTPATIENT
Start: 2021-05-26 | End: 2022-05-03 | Stop reason: DRUGHIGH

## 2021-05-26 NOTE — TELEPHONE ENCOUNTER
We received remote transmission from patient's monitor at home. Transmission shows normal sensing and pacing function. Current ECG: ASVS at 87 bpm.  Battery life 13.2 years  AP 0.9%.  <0.1%. 12 NSVT episodes noted. 665 Fast A&V episodes noted. 2 AT/AF episodes noted. EP will review. See interrogation under cardiology tab in the [address] [place] [address] [address] field for more details. Please advise.  Thanks

## 2021-05-26 NOTE — TELEPHONE ENCOUNTER
Pt calling, last night she woke up with chest pain and now has some pain in her left arm, her had feels weird. Has  flutters with some pain on left side of chest, she is sending over a transmission now.  pls call to advise thank you

## 2021-05-26 NOTE — LETTER
[facility] [phone]  Luige Jose [address] [place] [address] [phone]    Pacemaker/Defibrillator Clinic    05/26/21      Pernell Allen  [facility]       Dear Pernell Allen    This letter is to inform you that we received the transmission from your monitor at home that checks your implanted heart device. The next date your monitor will automatically transmit will be 8-25-21. If your report needs attention we will notify you. Your device and monitor are wireless and most transmit cellularly, but please periodically check your monitor is still plugged in to the electrical outlet. If you still use the telephone land line to send please ensure the connection to the phone ricky is secure. This will help to ensure successful automatic transmissions in the future. Also, the monitor needs to be close to you while sleeping at night. Please be aware that the remote device transmission sites are periodically monitored only during regular business hours during which simultaneous in-office device clinics are being run. If your transmission requires attention, we will contact you as soon as possible. **PLEASE NOTE** that our [facility] policy and processes are changing to ensure a more seamless approach for all parties involved, allowing more time for our nurses to address patient issues and concerns. We will no longer be sending letters for NORMAL remote transmissions. You will be contacted by phone if your transmission requires attention (as previously done), and letters will only be sent regarding monitor disconnections or missed transmissions if you are unable to be reached by phone. Please do not be alarmed by this new process, as we will continue to contact you if your transmission report requires attention. This will be your final \"remote received\" letter.   From this point forward, the [facility] will be utilizing the no news is good news approach. As always, please feel free to contact your nurse with any questions or concerns. Thank you.     [facility], [place]

## 2021-05-26 NOTE — RESULT ENCOUNTER NOTE
Short atrial tach episode. beta-blocker was increased. Reviewed.     Sumi Berkowitz MD [facility]

## 2021-05-26 NOTE — TELEPHONE ENCOUNTER
Increase her toprol XL to 100 mg daily, have her monitor her Blood pressure and symptoms. New script sent to her pharmacy.  Please call and inform the patient

## 2021-05-26 NOTE — PROGRESS NOTES
We received remote transmission from patient's monitor at home. Transmission shows normal sensing and pacing function. Noted ST, AF and NSVT. Pt is not on anti coagualtion meds. EP to review.

## 2021-07-28 ENCOUNTER — APPOINTMENT (OUTPATIENT)
Dept: ULTRASOUND IMAGING | Age: 36
End: 2021-07-28
Payer: COMMERCIAL

## 2021-07-28 ENCOUNTER — APPOINTMENT (OUTPATIENT)
Dept: CT IMAGING | Age: 36
End: 2021-07-28
Payer: COMMERCIAL

## 2021-07-28 ENCOUNTER — HOSPITAL ENCOUNTER (EMERGENCY)
Age: 36
Discharge: HOME | End: 2021-07-28
Attending: STUDENT IN AN ORGANIZED HEALTH CARE EDUCATION/TRAINING PROGRAM
Payer: COMMERCIAL

## 2021-07-28 VITALS
OXYGEN SATURATION: 100 % | HEIGHT: 69 IN | TEMPERATURE: 98.4 F | RESPIRATION RATE: 18 BRPM | BODY MASS INDEX: 43.4 KG/M2 | SYSTOLIC BLOOD PRESSURE: 109 MMHG | HEART RATE: 66 BPM | WEIGHT: 293 LBS | DIASTOLIC BLOOD PRESSURE: 69 MMHG

## 2021-07-28 DIAGNOSIS — R10.31 RIGHT LOWER QUADRANT ABDOMINAL PAIN: Primary | ICD-10-CM

## 2021-07-28 LAB
A/G RATIO: 1.1 (ref 1.1–2.2)
ALBUMIN SERPL-MCNC: 3.7 G/DL (ref 3.4–5)
ALP BLD-CCNC: 49 U/L (ref 40–129)
ALT SERPL-CCNC: 14 U/L (ref 10–40)
ANION GAP SERPL CALCULATED.3IONS-SCNC: 9 MMOL/L (ref 3–16)
AST SERPL-CCNC: 15 U/L (ref 15–37)
BASOPHILS ABSOLUTE: 0 K/UL (ref 0–0.2)
BASOPHILS RELATIVE PERCENT: 0.7 %
BILIRUB SERPL-MCNC: 0.4 MG/DL (ref 0–1)
BILIRUBIN URINE: NEGATIVE
BLOOD, URINE: NEGATIVE
BUN BLDV-MCNC: 8 MG/DL (ref 7–20)
CALCIUM SERPL-MCNC: 9.2 MG/DL (ref 8.3–10.6)
CHLORIDE BLD-SCNC: 105 MMOL/L (ref 99–110)
CLARITY: CLEAR
CO2: 25 MMOL/L (ref 21–32)
COLOR: YELLOW
CREAT SERPL-MCNC: 0.6 MG/DL (ref 0.6–1.1)
EOSINOPHILS ABSOLUTE: 0.3 K/UL (ref 0–0.6)
EOSINOPHILS RELATIVE PERCENT: 4.7 %
GFR AFRICAN AMERICAN: >60
GFR NON-AFRICAN AMERICAN: >60
GLOBULIN: 3.5 G/DL
GLUCOSE BLD-MCNC: 134 MG/DL (ref 70–99)
GLUCOSE URINE: NEGATIVE MG/DL
HCG QUALITATIVE: NEGATIVE
HCT VFR BLD CALC: 34.1 % (ref 36–48)
HEMOGLOBIN: 11.1 G/DL (ref 12–16)
KETONES, URINE: NEGATIVE MG/DL
LEUKOCYTE ESTERASE, URINE: NEGATIVE
LIPASE: 35 U/L (ref 13–60)
LYMPHOCYTES ABSOLUTE: 2.1 K/UL (ref 1–5.1)
LYMPHOCYTES RELATIVE PERCENT: 31.1 %
MAGNESIUM: 1.9 MG/DL (ref 1.8–2.4)
MCH RBC QN AUTO: 24.1 PG (ref 26–34)
MCHC RBC AUTO-ENTMCNC: 32.4 G/DL (ref 31–36)
MCV RBC AUTO: 74.2 FL (ref 80–100)
MICROSCOPIC EXAMINATION: NORMAL
MONOCYTES ABSOLUTE: 0.5 K/UL (ref 0–1.3)
MONOCYTES RELATIVE PERCENT: 8.2 %
NEUTROPHILS ABSOLUTE: 3.7 K/UL (ref 1.7–7.7)
NEUTROPHILS RELATIVE PERCENT: 55.3 %
NITRITE, URINE: NEGATIVE
PDW BLD-RTO: 14.2 % (ref 12.4–15.4)
PH UA: 5.5 (ref 5–8)
PLATELET # BLD: 318 K/UL (ref 135–450)
PMV BLD AUTO: 7.7 FL (ref 5–10.5)
POTASSIUM REFLEX MAGNESIUM: 3.5 MMOL/L (ref 3.5–5.1)
PROTEIN UA: NEGATIVE MG/DL
RBC # BLD: 4.6 M/UL (ref 4–5.2)
SODIUM BLD-SCNC: 139 MMOL/L (ref 136–145)
SPECIFIC GRAVITY UA: 1.02 (ref 1–1.03)
TOTAL PROTEIN: 7.2 G/DL (ref 6.4–8.2)
URINE REFLEX TO CULTURE: NORMAL
URINE TYPE: NORMAL
UROBILINOGEN, URINE: 0.2 E.U./DL
WBC # BLD: 6.6 K/UL (ref 4–11)

## 2021-07-28 PROCEDURE — 93975 VASCULAR STUDY: CPT

## 2021-07-28 PROCEDURE — 99283 EMERGENCY DEPT VISIT LOW MDM: CPT

## 2021-07-28 PROCEDURE — 74177 CT ABD & PELVIS W/CONTRAST: CPT

## 2021-07-28 PROCEDURE — 83690 ASSAY OF LIPASE: CPT

## 2021-07-28 PROCEDURE — 83735 ASSAY OF MAGNESIUM: CPT

## 2021-07-28 PROCEDURE — 84703 CHORIONIC GONADOTROPIN ASSAY: CPT

## 2021-07-28 PROCEDURE — 2580000003 HC RX 258: Performed by: STUDENT IN AN ORGANIZED HEALTH CARE EDUCATION/TRAINING PROGRAM

## 2021-07-28 PROCEDURE — 76830 TRANSVAGINAL US NON-OB: CPT

## 2021-07-28 PROCEDURE — 6360000002 HC RX W HCPCS: Performed by: STUDENT IN AN ORGANIZED HEALTH CARE EDUCATION/TRAINING PROGRAM

## 2021-07-28 PROCEDURE — 96374 THER/PROPH/DIAG INJ IV PUSH: CPT

## 2021-07-28 PROCEDURE — 85025 COMPLETE CBC W/AUTO DIFF WBC: CPT

## 2021-07-28 PROCEDURE — 81003 URINALYSIS AUTO W/O SCOPE: CPT

## 2021-07-28 PROCEDURE — 80053 COMPREHEN METABOLIC PANEL: CPT

## 2021-07-28 PROCEDURE — 6360000004 HC RX CONTRAST MEDICATION: Performed by: STUDENT IN AN ORGANIZED HEALTH CARE EDUCATION/TRAINING PROGRAM

## 2021-07-28 PROCEDURE — 96361 HYDRATE IV INFUSION ADD-ON: CPT

## 2021-07-28 RX ORDER — 0.9 % SODIUM CHLORIDE 0.9 %
1000 INTRAVENOUS SOLUTION INTRAVENOUS ONCE
Status: COMPLETED | OUTPATIENT
Start: 2021-07-28 | End: 2021-07-28

## 2021-07-28 RX ORDER — KETOROLAC TROMETHAMINE 30 MG/ML
15 INJECTION, SOLUTION INTRAMUSCULAR; INTRAVENOUS ONCE
Status: COMPLETED | OUTPATIENT
Start: 2021-07-28 | End: 2021-07-28

## 2021-07-28 RX ORDER — ONDANSETRON 2 MG/ML
4 INJECTION INTRAMUSCULAR; INTRAVENOUS EVERY 6 HOURS PRN
Status: DISCONTINUED | OUTPATIENT
Start: 2021-07-28 | End: 2021-07-28 | Stop reason: HOSPADM

## 2021-07-28 RX ADMIN — KETOROLAC TROMETHAMINE 15 MG: 30 INJECTION, SOLUTION INTRAMUSCULAR; INTRAVENOUS at 16:19

## 2021-07-28 RX ADMIN — SODIUM CHLORIDE 1000 ML: 9 INJECTION, SOLUTION INTRAVENOUS at 16:19

## 2021-07-28 RX ADMIN — IOPAMIDOL 75 ML: 755 INJECTION, SOLUTION INTRAVENOUS at 17:28

## 2021-07-28 ASSESSMENT — PAIN SCALES - GENERAL
PAINLEVEL_OUTOF10: 2
PAINLEVEL_OUTOF10: 4
PAINLEVEL_OUTOF10: 4

## 2021-07-30 NOTE — ED PROVIDER NOTES
Primary Care Physician: Nori Marquis MD   Attending Physician: No att. providers found     History   Chief Complaint   Patient presents with    Abdominal Pain     started with right side/middle abd pain went to pcp and they sent her here for possible appy or kidney stone,        HPI   Vu Henderson is a 39 y.o. female with history of asthma, congestive heart failure, patient, sciatic, ventricular septal defect status post repair presenting accompanied by mom complaining of abdominal pain which started 24 hours ago. Pain is on her right lower quadrant. She did call the PCP would recommend she come to emergency room to be evaluated for kidney stone and appendicitis. Patient stated pain is very severe is intermittent. She denies any fevers chills nausea vomiting chest pain or shortness of breath. No vaginal bleed or discharge no dysuria. No concerns with pregnancy.     Past Medical History:   Diagnosis Date    Asthma     CHF (congestive heart failure) (HCC)     Congenital heart defect     Migraines, basilar     Palpitations     Sciatica     lower right    VSD (ventricular septal defect and aortic arch hypoplasia         Past Surgical History:   Procedure Laterality Date    CARDIAC SURGERY      ventricular septal defect repair at 4 months    PACEMAKER PLACEMENT  07/31/2019    TONSILLECTOMY      and adnoids removed    VSD REPAIR      baby        Family History   Problem Relation Age of Onset    No Known Problems Mother     No Known Problems Father         Social History     Socioeconomic History    Marital status: Single     Spouse name: None    Number of children: None    Years of education: None    Highest education level: None   Occupational History    None   Tobacco Use    Smoking status: Never Smoker    Smokeless tobacco: Never Used   Vaping Use    Vaping Use: Never used   Substance and Sexual Activity    Alcohol use: Yes     Comment: occ    Drug use: No    Sexual activity: None Other Topics Concern    None   Social History Narrative    None     Social Determinants of Health     Financial Resource Strain:     Difficulty of Paying Living Expenses:    Food Insecurity:     Worried About Running Out of Food in the Last Year:     [address] in the Last Year:    Transportation Needs:     Lack of Transportation (Medical):  Lack of Transportation (Non-Medical):    Physical Activity:     Days of Exercise per Week:     Minutes of Exercise per Session:    Stress:     Feeling of Stress :    Social Connections:     Frequency of Communication with Friends and Family:     Frequency of Social Gatherings with Friends and Family:     Attends [employer] Services:     Active Member of Clubs or Organizations:     Attends Club or Organization Meetings:     Marital Status:    Intimate Partner Violence:     Fear of Current or Ex-Partner:     Emotionally Abused:     Physically Abused:     Sexually Abused:         Review of Systems   10 total systems reviewed and found to be negative unless otherwise noted in HPI     Physical Exam   /69   Pulse 66   Temp 98.4 °F (36.9 °C)   Resp 18   Ht 5' 9\" (1.753 m)   Wt (!) 313 lb (142 kg)   LMP 07/17/2021   SpO2 100%   BMI 46.22 kg/m²      CONSTITUTIONAL: Well appearing, in no acute distress   HEAD: atraumatic, normocephalic   EYES: PERRL, No injection, discharge or scleral icterus. ENT: Moist mucous membranes. NECK: Normal ROM, NO LAD   CARDIOVASCULAR: Regular rate and rhythm. No murmurs or gallop. PULMONARY/CHEST: Airway patent. No retractions. Breath sounds clear with good air entry bilaterally. ABDOMEN: Soft, Non-distended but with right lower quadrant tenderness. , without guarding or rebound. SKIN: Acyanotic, warm, dry   MUSCULOSKELETAL: No swelling, tenderness or deformity   NEUROLOGICAL: Awake and oriented x 3. Pulses intact. Grossly nonfocal   Nursing note and vitals reviewed.      ED Course & Medical Decision Making Medications   0.9 % sodium chloride bolus (0 mLs Intravenous Stopped 7/28/21 1931)   ketorolac (TORADOL) injection 15 mg (15 mg Intravenous Given 7/28/21 1619)   iopamidol (ISOVUE-370) 76 % injection 75 mL (75 mLs Intravenous Given 7/28/21 1728)      Labs Reviewed   CBC WITH AUTO DIFFERENTIAL - Abnormal; Notable for the following components:       Result Value    Hemoglobin 11.1 (*)     Hematocrit 34.1 (*)     MCV 74.2 (*)     MCH 24.1 (*)     All other components within normal limits    Narrative:     Performed at:  [facility] [address]   Phone [phone]   COMPREHENSIVE METABOLIC PANEL W/ REFLEX TO MG FOR LOW K - Abnormal; Notable for the following components:    Glucose 134 (*)     All other components within normal limits    Narrative:     Performed at:  [facility] [address]   Phone [phone]   URINE RT REFLEX TO CULTURE    Narrative:     Performed at:  [facility] [address]   Phone [phone]   LIPASE    Narrative:     Performed at:  [facility] [address]   Phone [phone]   HCG, SERUM, QUALITATIVE    Narrative:     Performed at:  [facility] [address]   Phone [phone]   MAGNESIUM    Narrative:     Performed at:  [facility] [address]   Phone [phone]      US DUP ABD PEL RETRO SCROT COMPLETE   Final Result   Normal Doppler vascularity of the adnexa. No dominant cyst or mass is   identified. Uterus is unremarkable. Small amount of free fluid in the cul-de-sac, within the physiological range. US NON OB TRANSVAGINAL   Final Result   Normal Doppler vascularity of the adnexa.   No dominant cyst or mass is identified. Uterus is unremarkable. Small amount of free fluid in the cul-de-sac, within the physiological range. CT ABDOMEN PELVIS W IV CONTRAST Additional Contrast? None   Final Result   1. No acute process. Negative for appendicitis or obstructive uropathy   2. Mild colonic diverticulosis with no diverticulitis            US NON OB TRANSVAGINAL    Result Date: 7/28/2021  EXAMINATION: PELVIC ULTRASOUND; DOPPLER EVALUATION OF THE PELVIS 7/28/2021 TECHNIQUE: Transvaginal pelvic ultrasound was performed.; DOPPLER ULTRASOUND OF THE PELVIS  Grayscale, color Doppler, and spectral Doppler evaluation were performed. COMPARISON: None HISTORY: ORDERING SYSTEM PROVIDED HISTORY: r/o torsion TECHNOLOGIST PROVIDED HISTORY: Reason for exam:->r/o torsion Right pelvic pain. Irregular bleeding. FINDINGS: Measurements: Uterus:  8 x 4.5 x 5.4 cm Endometrial stripe:  6.7 mm Right Ovary:  3.4 x 1.7 x 1.8 cm. Left Ovary:  1.9 x 2.8 x 2.2 cm. Ultrasound Findings: Uterus: Uterus demonstrates normal myometrial echotexture. Endometrial stripe: Endometrial stripe is within normal limits. Right Ovary: Right ovary is within normal limits. Follicles are noted. There is normal spectral Doppler. Left Ovary:  Left ovary is within normal limits. Follicles are noted. There is normal spectral Doppler. Free Fluid: A small amount of free fluid is noted in the left aspect of the cul-de-sac. Normal Doppler vascularity of the adnexa. No dominant cyst or mass is identified. Uterus is unremarkable. Small amount of free fluid in the cul-de-sac, within the physiological range. CT ABDOMEN PELVIS W IV CONTRAST Additional Contrast? None    Result Date: 7/28/2021  EXAMINATION: CT OF THE ABDOMEN AND PELVIS WITH CONTRAST 7/28/2021 5:02 pm TECHNIQUE: CT of the abdomen and pelvis was performed with the administration of intravenous contrast. Multiplanar reformatted images are provided for review.  Dose modulation, iterative reconstruction, and/or weight based adjustment of the mA/kV was utilized to reduce the radiation dose to as low as reasonably achievable. COMPARISON: None. HISTORY: ORDERING SYSTEM PROVIDED HISTORY: RLQ and right flabk pain. R/O appy vs kidney stone TECHNOLOGIST PROVIDED HISTORY: Additional Contrast?->None Reason for exam:->RLQ and right flabk pain. R/O appy vs kidney stone Decision Support Exception - unselect if not a suspected or confirmed emergency medical condition->Emergency Medical Condition (MA) Reason for Exam: RLQ and right flabk pain. R/O appy vs kidney stone Acuity: Acute Type of Exam: Initial FINDINGS: Lower Chest: Lung bases clear Organs: Solid organs unremarkable. No urolithiasis or hydronephrosis. Gallbladder unremarkable GI/Bowel: Normal appendix. A few widely scattered colonic diverticula with no associated inflammation Pelvis: Reproductive organs within normal limits. Urinary bladder unremarkable. No pelvic fluid Peritoneum/Retroperitoneum: No ascites or pneumoperitoneum. Normal caliber aorta Bones/Soft Tissues: No acute bony abnormality. Degenerative disc disease L5-S1     1. No acute process. Negative for appendicitis or obstructive uropathy 2. Mild colonic diverticulosis with no diverticulitis     US DUP ABD PEL RETRO SCROT COMPLETE    Result Date: 7/28/2021  EXAMINATION: PELVIC ULTRASOUND; DOPPLER EVALUATION OF THE PELVIS 7/28/2021 TECHNIQUE: Transvaginal pelvic ultrasound was performed.; DOPPLER ULTRASOUND OF THE PELVIS  Grayscale, color Doppler, and spectral Doppler evaluation were performed. COMPARISON: None HISTORY: ORDERING SYSTEM PROVIDED HISTORY: r/o torsion TECHNOLOGIST PROVIDED HISTORY: Reason for exam:->r/o torsion Right pelvic pain. Irregular bleeding. FINDINGS: Measurements: Uterus:  8 x 4.5 x 5.4 cm Endometrial stripe:  6.7 mm Right Ovary:  3.4 x 1.7 x 1.8 cm. Left Ovary:  1.9 x 2.8 x 2.2 cm. Ultrasound Findings: Uterus: Uterus demonstrates normal myometrial echotexture. Endometrial stripe: Endometrial stripe is within normal limits. Right Ovary: Right ovary is within normal limits. Follicles are noted. There is normal spectral Doppler. Left Ovary:  Left ovary is within normal limits. Follicles are noted. There is normal spectral Doppler. Free Fluid: A small amount of free fluid is noted in the left aspect of the cul-de-sac. Normal Doppler vascularity of the adnexa. No dominant cyst or mass is identified. Uterus is unremarkable. Small amount of free fluid in the cul-de-sac, within the physiological range. PROCEDURES:   Procedures    ASSESSMENT AND PLAN:  Aramis Berrios is a 39 y.o. female senting with right lower quadrant pain. On exam nontoxic in no acute distress but tender to palpation right lower quadrant. Given her presentation I did obtain labs which are unremarkable. A CT of the abdomen pelvis was obtained and showed no kidney stone or appendicitis. Results were less likely surgical.  Patient continued to complain of pain as a result I did obtain an ultrasound to evaluate for torsion. Results again were unremarkable. At this time the cause of the pain is unknown but less likely appendicitis or any surgical pathology. Patient is stable no indication for admission was discharged home recommendation to follow-up with primary care doctor. .    ClINICAL IMPRESSION:  1.  Right lower quadrant abdominal pain          PATIENT REFERRED TO:  Matthias Ellis MD  [facility] [phone] [phone]    Schedule an appointment as soon as possible for a visit in 2 days        DISCHARGE MEDICATIONS:  Discharge Medication List as of 7/28/2021  8:50 PM        DISCONTINUED MEDICATIONS:  Discharge Medication List as of 7/28/2021  8:50 PM        DISPOSITION Decision To Discharge 07/28/2021 08:42:43 PM  -We have instructed the patient, Aramis Berrios) to return to the ED or call her PCP if her pain/symptoms worsen. -Findings and recommendations

## 2021-08-25 ENCOUNTER — NURSE ONLY (OUTPATIENT)
Dept: CARDIOLOGY CLINIC | Age: 36
End: 2021-08-25
Payer: COMMERCIAL

## 2021-08-25 DIAGNOSIS — I44.2 CHB (COMPLETE HEART BLOCK) (HCC): ICD-10-CM

## 2021-08-25 DIAGNOSIS — Z95.0 PACEMAKER: ICD-10-CM

## 2021-08-25 PROCEDURE — 93296 REM INTERROG EVL PM/IDS: CPT | Performed by: INTERNAL MEDICINE

## 2021-08-25 PROCEDURE — 93294 REM INTERROG EVL PM/LDLS PM: CPT | Performed by: INTERNAL MEDICINE

## 2021-08-25 NOTE — PROGRESS NOTES
We received remote transmission from patient's monitor at home. Transmission shows normal sensing and pacing function. Noted SVT and NSVT. Pt is on Toprol. EP physician to review.

## 2021-09-22 ENCOUNTER — TELEPHONE (OUTPATIENT)
Dept: CARDIOLOGY CLINIC | Age: 36
End: 2021-09-22

## 2021-09-22 NOTE — TELEPHONE ENCOUNTER
Pt calling last night she had issues with fluttering and heart racing and she sent a transmission about half hour ago. Can someone look at that and call her to let her know if anything showed up? And what should she do?  Pls call to advise Thank you

## 2021-09-22 NOTE — TELEPHONE ENCOUNTER
We received remote transmission from patient's monitor at home. Transmission shows normal sensing and pacing function. Current ECG ASVS at 63-65 bpm.  Battery life 12.9 years  AP 0.4%.  <0.1%. 2 monitored VT episodes noted. Last on 9/8/2021, lasting 6 seconds and 17 beats. Episode on 8/28/2021 appears to be about 19 beats. 5 NSVT episodes noted. Last on 9/8/2021, longest 3 seconds. 96 Fast A&V episodes [id number]. Last on 9/21/2021, longest 2 minutes. 1 AT/AF noted on 9/7/2021 lasting 2 minutes 12 seconds appears to be AT  Patient remains on sotalol and metoprolol. EP physician review. See interrogation under cardiology tab in the [address] [place] [address] [address] field for more details. Please advise.

## 2021-09-22 NOTE — TELEPHONE ENCOUNTER
Continues to have brief episodes.  Will defer to Stephania Mckeon for evaluation of transmission then discuss with MXA later today

## 2021-09-27 NOTE — TELEPHONE ENCOUNTER
Remote transmission received for patient's dual chamber PACEMAKER. Transmission shows normal sensing and pacing function. EP physician will review. See interrogation under the cardiology tab in the [address] [place] [address] [address] field for more details. Will continue to monitor remotely. Since last interrogation 9/22/21-SVT noted (last on 9/27/21 and AT 26-Sep-2021 x 1.5 min)  Date of Interrogation: 27-Sep-2021 11:00:56-NSR.

## 2021-09-27 NOTE — TELEPHONE ENCOUNTER
Episodes of SVT noted. Very brief in nature. No changes for now. Will discuss medication changes vs EP study at upcoming appt.     SUKUMAR Weaver-CNP

## 2021-09-27 NOTE — TELEPHONE ENCOUNTER
Pt calling back stating no one has called her back, when she called. She stated she is still having issues with fluttering and heart racing and she trying to send a  transmission now. Can someone look at that and call her to let her know if anything showed up? What should she do?      Pls call to advise Thank you

## 2021-10-06 NOTE — PROGRESS NOTES
[facility]   Electrophysiology Follow up   Date: 10/7/2021  I had the privilege of visiting Malachi Thompson in the office. CC: Syncope  HPI: Malachi Thompson is a 39 y.o. female with pmh repaired DORV with LV dysfunction. She underwent interventricular tunnel VSD closure in 1985. 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 She developed atrial tachycardia in 2010 and she underwent successful cardioversion. She is referred today for further evaluation of palpitations. She reports she has intermittent palpitations that make her acutely SOB and dizzy, no arrhythmias have been captured by EKG. She reports the episodes last 5-7 minutes in duration.     8/2/19: Insertion of dual chamber pacemaker for CHB     Friday 9/27/19 she had chest pain and went to the ED and they discharged her. Saturday she had episodes of shortness of breath and her heart racing and she felt like she could barely walk. She laid down and rested and the episode eased up. She said when she was driving here she felt light headed. Her OB/GYN told her she could not take Lasix because of the amniotic fluid could be affected.     On 10/2/19 she had a hospital admission for asthma related symptoms with treatment including albuterol, prednisone and corticosteroid inhaler. She had episodes of atrial fib/flutter with aRVR.     12/23/2020 Presented to the ED with dental pain, chest pain and Shortness of breath    She has gained some weight. She has been mostly staying at home. She has been having some palpitations and occasional dizziness. Some of them may correlate with her episodes of tachycardia. She had a few episodes of nonsustained VT mostly in late July and August.  She has had a few episodes of atrial tachycardia and flutter/fibrillation lasted at most about 2 minutes.     Assessment and plan:   NSVT    -sotalol 120 mg BID and beta-blocker    -Will monitor   -Symptomatic with Health Maintenance Due   Topic Date Due   • Diabetes Eye Exam  02/14/2021   • Diabetes Foot Exam  08/19/2021   • Medicare Wellness Visit  08/19/2021   • Depression Screening  08/19/2021       Patient is due for topics as listed above but is not proceeding with Depression Screening , Diabetes Eye Exam, Diabetes Foot Exam and MWV (Medicare Wellness Visit) at this time.          even short episodes     She had a few more episodes during late July and late August.  However she has not consistently had any since then. We will continue to monitor them. The longest nonsustained VT was 6 seconds. She has an echo scheduled in March of next year. We will continue to monitor. I also asked her to let us know if her symptoms change. We will continue to monitor remotely. Atrial fibrillation/flutter with rapid ventricular response  Atrial tachycardia   -Symptomatic with these episodes, resolve with rest.   -Toprol XL 75 mg, sotalol 120 mg BID    -No anticoagulation for now   -Will consider ablation in future if frequency and symptoms mandate it and if patient prefers it over medical therapy  [place] is less than 0.1%. Episodes are under to 1/2 minutes. Continue to monitor.     High grade AV block   -S/p Dual chamber PPM 7/31/19   -The CIED was interrogated and programmed and I supervised and reviewed all the data. All findings and changes are in device interrogation sheet and reflect my personal interpretation and changes and is scanned to Epic.   12.9 years remaining, AP 1.6% ,  0.1%        Syncope/Pause   -No further episodes. Did have presyncope   -Encouraged supportive measures including maintaining hydration   - s/p Permanent pacemaker     Congenital Heart disease   -Stable. [facility]     dyspnea on exertion   -Normal ventricular function    Obesity    Excessive weight is complicating assessment and treatment. It is placing patient at risk for multiple co-morbidities as well as early death and contributing to the patient's presentation. - discussed weight management with diet and exercise    She will try to lose weight and I think that will help with some of her symptoms. Plan:   Continue to monitor episodes of ventricular tachycardia and atrial arrhythmias. Follow-up with adult congenital clinic [facility].  Attempt to lose weight.       Patient Active Problem List    Diagnosis Date Noted    NSVT (nonsustained ventricular tachycardia) (HCC)     Chest pain 08/28/2019    Palpitation     Dyspnea and respiratory abnormalities     [facility]     Pacemaker     CHB (complete heart block) ([facility] 07/31/2019    PAF (paroxysmal atrial fibrillation) ([facility]      Diagnostic studies:   ECG:10/7/21  None      ECHO 03/11/2020 [facility]  Summary:     1. Double outlet right ventricle with normally related great vessels and a doubly committed VSD      -s/p interventricular tunnel VSD closure, 10/17/85      -s/p resection of RV muscle bundles, 8/10/00.   2. Mild LVOT narrowing secondary to interventricular tunnel repair, mean gradient 14 mmHg, previously 22 mmHg. 3. Trivial aortic valve regurgitation. 4. Moderate tricuspid valve regurgitation. 5. No right ventricular hypertension. 6. Right ventricle is mildly dilated and the systolic function is normal.   7. Left ventricle is normal in size and the systolic function is normal.   8. There is no significant pericardial effusion. 9. Compared to the previous echocardiogram of 11/11/2019, the patient is post-partum and the LVOT gradient has decreased. There is a possible subaortic membrane in the LVOT.     Echo:  8/28/19  Summary   -Normal left ventricle size, wall thickness, and systolic function with an   estimated ejection fraction of 55%.  -Abnormal septal motion.   -Trivial mitral regurgitation.   -Patient has a history of a ventricular septal defect and VSD repair.   -Patient has a history of interventricular tunnel repair.   -Patient has a history of interventricular tunnel repair. The aortic valve   is poorly visualized. There is a fixed gradient through the LV outflow area.   The peak gradient is estimated at 47 mmHg with a mean pressure gradient of   27 mmHg. It is unclear if the obstruction is due to the VSD repair or if  Maksim Fudge is some aortic stenosis.  The obstruction is moderate.   -Normal diastolic function. Avg. E/e=8.65   -Bubble study performed with no obvious right to left shunt.   -Pacemaker wire noted in the right heart.     Echo 7/18/19  Summary:   1. Double outlet right ventricle with normally related great vessels and a doubly committed VSD      -s/p interventricular tunnel VSD closure, 10/17/85      -s/p resection of RV muscle bundles, 8/10/00.   2. Mild LVOT narrowing secondary to interventricular tunnel repair, peak gradient 28 mmHg, mean gradient 14 mmHg. 3. No aortic valve regurgitation. 4. Left ventricle is normal in size and the systolic function is normal.   5. Mild tricuspid valve regurgitation. 6. Right ventricle is mildly dilated and the systolic function is normal.   7. The ascending aorta, transverse arch and descending aorta are unobstructed. 8. There is no significant pericardial effusion. 9. Compared to the previous echocardiogram of 3/28/2018, there is no longer RV hypertension. I independently reviewed the cardiac diagnostic studies, ECG and relevant imaging studies. Lab Results   Component Value Date    LVEF 55 08/28/2019     No results found for: TSHFT4, TSH      Physical Examination:  Vitals:    10/07/21 1626   BP: 124/85   Pulse: 108      Wt Readings from Last 3 Encounters:   10/07/21 (!) 321 lb (145.6 kg)   07/28/21 (!) 313 lb (142 kg)   02/26/21 285 lb (129.3 kg)       · Constitutional: Oriented. No distress. · Head: Normocephalic and atraumatic. · Mouth/Throat: Oropharynx is clear and moist.   · Eyes: Conjunctivae normal. EOM are normal.   · Neck: Neck supple. No rigidity. No JVD present. · Cardiovascular: Normal rate, regular rhythm, S1&S2. · Pulmonary/Chest: Bilateral respiratory sounds. No wheezes, No rhonchi. · Abdominal: Soft. Bowel sounds present. No distension, No tenderness. · Musculoskeletal: No tenderness. No edema    · Lymphadenopathy: Has no cervical adenopathy. · Neurological: Alert and oriented. Cranial nerve appears intact, No Gross deficit   · Skin: Skin is warm and dry. No rash noted. · Psychiatric: Has a normal behavior       Review of System:  [x] Full ROS obtained and negative except as mentioned in HPI    Prior to Admission medications    Medication Sig Start Date End Date Taking? Authorizing Provider   metoprolol succinate (TOPROL XL) 100 MG extended release tablet Take 1 tablet by mouth daily 5/26/21   Tim Jackson MD   sotalol (BETAPACE) 80 MG tablet Take 1.5 tablets by mouth 2 times daily 1/8/21   Tim Jackson MD   aspirin 81 MG tablet Take 81 mg by mouth daily    Historical Provider, MD       Allergies   Allergen Reactions    Caffeine Hives and Palpitations    Red Dye Hives and Palpitations       Social History:  Reviewed. reports that she has never smoked. She has never used smokeless tobacco. She reports current alcohol use. She reports that she does not use drugs. Family History:  Reviewed. Reviewed. No family history of SCD. Relevant and available labs, and cardiovascular diagnostics reviewed. Reviewed. I independently reviewed relevant and available cardiac diagnostic tests ECG, CXR, Echo, Stress test, Device interrogation, Holter, CT scan. Outside medical records via Care everywhere reviewed and summarized in H&P above. Complex medical condition with multiple medical problems affecting prognosis and outcome of EP interventions          - The patient is counseled to avoid excess caffeine, and energy drinks as this may exacerbated ectopy and arrhythmia. - The patient is counseled to get regular exercise 3-5 times per week to control cardiovascular risk factors. - The patient is counseled to lose weigt to control cardiovascular risk factors. All questions and concerns were addressed to the patient/family. Alternatives to my treatment were discussed.  I have discussed the above stated plan and the patient verbalized understanding and agreed with the plan.    Scribe attestation: This note was scribed in the presence of Padilla Rowe MD by Stefan Larson RN    I, Dr. Padilla Rowe personally performed the services described in this documentation as scribed by RN in my presence, and it is both accurate and complete.        NOTE: This report was transcribed using voice recognition software. Every effort was made to ensure accuracy, however, inadvertent computerized transcription errors may be present.      Padilla Rowe MD, Casey Kanner [address]   Office: [phone]  Fax: [phone]

## 2021-10-07 ENCOUNTER — OFFICE VISIT (OUTPATIENT)
Dept: CARDIOLOGY CLINIC | Age: 36
End: 2021-10-07
Payer: COMMERCIAL

## 2021-10-07 ENCOUNTER — NURSE ONLY (OUTPATIENT)
Dept: CARDIOLOGY CLINIC | Age: 36
End: 2021-10-07
Payer: COMMERCIAL

## 2021-10-07 VITALS
WEIGHT: 293 LBS | HEIGHT: 69 IN | SYSTOLIC BLOOD PRESSURE: 124 MMHG | BODY MASS INDEX: 43.4 KG/M2 | DIASTOLIC BLOOD PRESSURE: 85 MMHG | HEART RATE: 108 BPM

## 2021-10-07 DIAGNOSIS — E66.01 OBESITIES, MORBID (HCC): ICD-10-CM

## 2021-10-07 DIAGNOSIS — I47.29 NSVT (NONSUSTAINED VENTRICULAR TACHYCARDIA): ICD-10-CM

## 2021-10-07 DIAGNOSIS — I44.2 CHB (COMPLETE HEART BLOCK) (HCC): ICD-10-CM

## 2021-10-07 DIAGNOSIS — Z95.0 PACEMAKER: ICD-10-CM

## 2021-10-07 DIAGNOSIS — Q24.9 CONGENITAL HEART DISEASE: ICD-10-CM

## 2021-10-07 DIAGNOSIS — Z95.0 PACEMAKER: Primary | ICD-10-CM

## 2021-10-07 DIAGNOSIS — R00.2 PALPITATION: ICD-10-CM

## 2021-10-07 DIAGNOSIS — I48.0 PAF (PAROXYSMAL ATRIAL FIBRILLATION) (HCC): Primary | ICD-10-CM

## 2021-10-07 DIAGNOSIS — I48.0 PAF (PAROXYSMAL ATRIAL FIBRILLATION) (HCC): ICD-10-CM

## 2021-10-07 PROCEDURE — 99214 OFFICE O/P EST MOD 30 MIN: CPT | Performed by: INTERNAL MEDICINE

## 2021-10-08 PROCEDURE — 93280 PM DEVICE PROGR EVAL DUAL: CPT | Performed by: INTERNAL MEDICINE

## 2021-10-08 NOTE — PROGRESS NOTES
Patient comes in for programming evaluation for her pacemaker. All sensing and pacing parameters are within normal range. Battery life 12.9 years  AP 8.9%.  < 0.1%  70 VT episodes noted. 2,892 Fast A&V episodes noted. 6 AT/AF episodes noted. Patient remains on ASA 81 mg, metoprolol and sotalol. No changes need to be made at this time. Please see interrogation for more detail. Patient will see Dr. Laurel Sim today and follow up in 3 months in office or remotely.

## 2021-10-20 ENCOUNTER — TELEPHONE (OUTPATIENT)
Dept: CARDIOLOGY CLINIC | Age: 36
End: 2021-10-20

## 2021-10-20 NOTE — TELEPHONE ENCOUNTER
Pt calling stating she talked to [facility][1] about getting the COVID vaccine. Pt would like to know which one he recommends her to get?  Pls call to advise Thank you

## 2021-11-16 PROCEDURE — 93296 REM INTERROG EVL PM/IDS: CPT | Performed by: INTERNAL MEDICINE

## 2021-11-16 PROCEDURE — 93294 REM INTERROG EVL PM/LDLS PM: CPT | Performed by: INTERNAL MEDICINE

## 2021-11-23 NOTE — PROGRESS NOTES
We received remote transmission from patient's monitor at home. Transmission shows normal sensing and pacing function. Noted ST, SVT and NSVT. Pt is on Toprol. EP physician to review.

## 2021-11-24 ENCOUNTER — NURSE ONLY (OUTPATIENT)
Dept: CARDIOLOGY CLINIC | Age: 36
End: 2021-11-24

## 2021-11-24 DIAGNOSIS — I44.2 CHB (COMPLETE HEART BLOCK) (HCC): ICD-10-CM

## 2021-11-24 DIAGNOSIS — Z95.0 CARDIAC PACEMAKER IN SITU: Primary | ICD-10-CM

## 2021-12-12 ENCOUNTER — HOSPITAL ENCOUNTER (EMERGENCY)
Age: 36
Discharge: HOME | End: 2021-12-12
Attending: EMERGENCY MEDICINE
Payer: COMMERCIAL

## 2021-12-12 VITALS
RESPIRATION RATE: 16 BRPM | BODY MASS INDEX: 49.65 KG/M2 | SYSTOLIC BLOOD PRESSURE: 124 MMHG | DIASTOLIC BLOOD PRESSURE: 85 MMHG | WEIGHT: 293 LBS | TEMPERATURE: 98.4 F | HEART RATE: 104 BPM | OXYGEN SATURATION: 100 %

## 2021-12-12 DIAGNOSIS — I47.1 PAROXYSMAL SUPRAVENTRICULAR TACHYCARDIA (HCC): Primary | ICD-10-CM

## 2021-12-12 LAB
ANION GAP SERPL CALCULATED.3IONS-SCNC: 12 MMOL/L (ref 3–16)
BASOPHILS ABSOLUTE: 0.1 K/UL (ref 0–0.2)
BASOPHILS RELATIVE PERCENT: 0.9 %
BUN BLDV-MCNC: 9 MG/DL (ref 7–20)
CALCIUM SERPL-MCNC: 9.1 MG/DL (ref 8.3–10.6)
CHLORIDE BLD-SCNC: 105 MMOL/L (ref 99–110)
CO2: 22 MMOL/L (ref 21–32)
CREAT SERPL-MCNC: 0.6 MG/DL (ref 0.6–1.1)
EOSINOPHILS ABSOLUTE: 0.3 K/UL (ref 0–0.6)
EOSINOPHILS RELATIVE PERCENT: 3.8 %
GFR AFRICAN AMERICAN: >60
GFR NON-AFRICAN AMERICAN: >60
GLUCOSE BLD-MCNC: 99 MG/DL (ref 70–99)
HCG QUALITATIVE: NEGATIVE
HCT VFR BLD CALC: 36.7 % (ref 36–48)
HEMOGLOBIN: 11.9 G/DL (ref 12–16)
LYMPHOCYTES ABSOLUTE: 1.1 K/UL (ref 1–5.1)
LYMPHOCYTES RELATIVE PERCENT: 15.7 %
MCH RBC QN AUTO: 23.7 PG (ref 26–34)
MCHC RBC AUTO-ENTMCNC: 32.6 G/DL (ref 31–36)
MCV RBC AUTO: 72.9 FL (ref 80–100)
MONOCYTES ABSOLUTE: 0.6 K/UL (ref 0–1.3)
MONOCYTES RELATIVE PERCENT: 8.5 %
NEUTROPHILS ABSOLUTE: 4.8 K/UL (ref 1.7–7.7)
NEUTROPHILS RELATIVE PERCENT: 71.1 %
PDW BLD-RTO: 14.8 % (ref 12.4–15.4)
PLATELET # BLD: 324 K/UL (ref 135–450)
PMV BLD AUTO: 7.3 FL (ref 5–10.5)
POTASSIUM REFLEX MAGNESIUM: 4.1 MMOL/L (ref 3.5–5.1)
RBC # BLD: 5.03 M/UL (ref 4–5.2)
SODIUM BLD-SCNC: 139 MMOL/L (ref 136–145)
TROPONIN: <0.01 NG/ML
WBC # BLD: 6.7 K/UL (ref 4–11)

## 2021-12-12 PROCEDURE — 84703 CHORIONIC GONADOTROPIN ASSAY: CPT

## 2021-12-12 PROCEDURE — 85025 COMPLETE CBC W/AUTO DIFF WBC: CPT

## 2021-12-12 PROCEDURE — 80048 BASIC METABOLIC PNL TOTAL CA: CPT

## 2021-12-12 PROCEDURE — 99284 EMERGENCY DEPT VISIT MOD MDM: CPT

## 2021-12-12 PROCEDURE — 84484 ASSAY OF TROPONIN QUANT: CPT

## 2021-12-12 PROCEDURE — 2580000003 HC RX 258: Performed by: EMERGENCY MEDICINE

## 2021-12-12 PROCEDURE — 36415 COLL VENOUS BLD VENIPUNCTURE: CPT

## 2021-12-12 PROCEDURE — 93005 ELECTROCARDIOGRAM TRACING: CPT | Performed by: EMERGENCY MEDICINE

## 2021-12-12 RX ORDER — 0.9 % SODIUM CHLORIDE 0.9 %
1000 INTRAVENOUS SOLUTION INTRAVENOUS ONCE
Status: COMPLETED | OUTPATIENT
Start: 2021-12-12 | End: 2021-12-12

## 2021-12-12 RX ORDER — ALBUTEROL SULFATE 90 UG/1
2 AEROSOL, METERED RESPIRATORY (INHALATION) EVERY 6 HOURS PRN
Qty: 18 G | Refills: 3 | Status: SHIPPED | OUTPATIENT
Start: 2021-12-12

## 2021-12-12 RX ADMIN — SODIUM CHLORIDE 1000 ML: 9 INJECTION, SOLUTION INTRAVENOUS at 15:44

## 2021-12-12 ASSESSMENT — ENCOUNTER SYMPTOMS
COUGH: 0
SHORTNESS OF BREATH: 0
ABDOMINAL PAIN: 0
EYE REDNESS: 0
SORE THROAT: 0
RHINORRHEA: 0

## 2021-12-12 NOTE — ED PROVIDER NOTES
[facility]  eMERGENCY dEPARTMENT eNCOUnter      Pt Name: Flaca Noel  MRN: [id number]  [place] 1985  Date of evaluation: 12/12/2021  Provider: Sepideh Lynn MD    CHIEF COMPLAINT       Chief Complaint   Patient presents with    Palpitations     X 2 hours, has pacemaker, feels heart fluttering. HISTORY OF PRESENT ILLNESS   (Location/Symptom, Timing/Onset,Context/Setting, Quality, Duration, Modifying Factors, Severity)  Note limiting factors. Flaca Noel is a 39 y.o. female who presents to the emergency department with palpitations. The patient states that she has a past medical history of SVT. 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 She also has a history of asthma. She states she went in SVT a few hours prior to arrival and it persisted until she arrived here. It abated prior to me seeing the patient. She states she is on sotalol and a beta-blocker and has been compliant with the medications. States other than the palpitations / fast heart rate earlier today she has been feeling fine. HPI    NursingNotes were reviewed. REVIEW OF SYSTEMS    (2-9 systems for level 4, 10 or more for level 5)     Review of Systems   Constitutional: Negative for chills and fever. HENT: Negative for rhinorrhea and sore throat. Eyes: Negative for redness. Respiratory: Negative for cough and shortness of breath. Cardiovascular: Positive for palpitations. Negative for chest pain. Gastrointestinal: Negative for abdominal pain. Genitourinary: Negative for flank pain. Musculoskeletal: Negative for gait problem. Skin: Negative for rash. Neurological: Negative for headaches. Hematological: Negative for adenopathy. Psychiatric/Behavioral: Negative for confusion.        Except as noted above the remainder of the review of systems was reviewed and negative.        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          SURGICALHISTORY       Past Surgical History:   Procedure Laterality Date    CARDIAC SURGERY      ventricular septal defect repair at 4 months    PACEMAKER PLACEMENT  07/31/2019    TONSILLECTOMY      and adnoids removed    VSD REPAIR      baby         CURRENT MEDICATIONS       Discharge Medication List as of 12/12/2021  4:03 PM      CONTINUE these medications which have NOT CHANGED    Details   metoprolol succinate (TOPROL XL) 100 MG extended release tablet Take 1 tablet by mouth daily, Disp-90 tablet, R-3Normal      sotalol (BETAPACE) 80 MG tablet Take 1.5 tablets by mouth 2 times daily, Disp-90 tablet, R-2Normal      aspirin 81 MG tablet Take 81 mg by mouth dailyHistorical Med             ALLERGIES     Caffeine, Red dye, and Other    FAMILY HISTORY       Family History   Problem Relation Age of Onset    No Known Problems Mother     No Known Problems Father           SOCIAL HISTORY       Social History     Socioeconomic History    Marital status: Single     Spouse name: Not on file    Number of children: Not on file    Years of education: Not on file    Highest education level: Not on file   Occupational History    Not on file   Tobacco Use    Smoking status: Never Smoker    Smokeless tobacco: Never Used   Vaping Use    Vaping Use: Never used   Substance and Sexual Activity    Alcohol use: Yes     Comment: occ    Drug use: No    Sexual activity: Not on file   Other Topics Concern    Not on file   Social History Narrative    Not on file     Social Determinants of Health     Financial Resource Strain:     Difficulty of Paying Living Expenses: Not on file   Food Insecurity:     Worried About Running Out of Food in the Last Year: Not on file    Ran Out of Food in the Last Year: Not on file   Transportation Needs:     Lack of Transportation (Medical): Not on file    Lack of Transportation (Non-Medical): Not on file   Physical Activity:     Days of Exercise per Week: Not on file    Minutes of Exercise per Session: Not on file   Stress:     Feeling of Stress : Not on file   Social Connections:     Frequency of Communication with Friends and Family: Not on file    Frequency of Social Gatherings with Friends and Family: Not on file    Attends [employer] Services: Not on file    Active Member of [address] [place] [address] [employer] or Organizations: Not on file    Attends Club or Organization Meetings: Not on file    Marital Status: Not on file   Intimate Partner Violence:     Fear of Current or Ex-Partner: Not on file    Emotionally Abused: Not on file    Physically Abused: Not on file    Sexually Abused: Not on file   Housing Stability:     Unable to Pay for Housing in the Last Year: Not on file    Number of [place] in the Last Year: Not on file    Unstable Housing in the Last Year: Not on file       SCREENINGS             PHYSICAL EXAM    (up to 7 for level 4, 8 or more for level 5)     ED Triage Vitals [12/12/21 1413]   BP Temp Temp src Pulse Resp SpO2 Height Weight   (!) 146/112 98.4 °F (36.9 °C) -- 118 16 100 % -- (!) 336 lb 3.2 oz (152.5 kg)       Physical Exam  Vitals and nursing note reviewed. Constitutional:       Appearance: She is well-developed. She is not diaphoretic. HENT:      Head: Normocephalic and atraumatic. Mouth/Throat:      Mouth: Mucous membranes are moist.   Eyes:      General:         Right eye: No discharge. Left eye: No discharge. Conjunctiva/sclera: Conjunctivae normal.   Cardiovascular:      Rate and Rhythm: Tachycardia present. Pulses: Normal pulses. Pulmonary:      Effort: Pulmonary effort is normal. No respiratory distress. Breath sounds: Normal breath sounds. No wheezing, rhonchi or rales. Abdominal:      Palpations: Abdomen is soft. Tenderness: There is no abdominal tenderness. There is no guarding or rebound. Musculoskeletal:         General: Normal range of motion. Skin:     General: Skin is warm and dry. Capillary Refill: Capillary refill takes less than 2 seconds. Neurological:      Mental Status: She is alert and oriented to person, place, and time.    Psychiatric:         Behavior: Behavior normal.         DIAGNOSTIC RESULTS     EKG: All EKG's are interpreted by the Emergency Department Physician who either signs or Co-signsthis chart in the absence of a cardiologist.    The Ekg interpreted by me shows  sinus tachycardia, bgwz=639   Axis is   Left axis deviation  QTc is  496 msec  Right Bundle branch block  ST Segments: nonspecific changes  When compared to an EKG performed on February 26, 2021 sinus rhythm has been replaced with sinus tachycardia      RADIOLOGY:   Non-plain filmimages such as CT, Ultrasound and MRI are read by the radiologist. Plain radiographic images are visualized and preliminarily interpreted by the emergency physician with the below findings:        Interpretation per the Radiologist below, if available at the time ofthis note:    No orders to display         ED BEDSIDE ULTRASOUND:   Performed by ED Physician - none    LABS:  Labs Reviewed   CBC WITH AUTO DIFFERENTIAL - Abnormal; Notable for the following components:       Result Value    Hemoglobin 11.9 (*)     MCV 72.9 (*)     MCH 23.7 (*)     All other components within normal limits    Narrative:     Performed at:  [facility]  [address] [place], [place] [address]   Phone [phone]   BASIC METABOLIC PANEL W/ REFLEX TO MG FOR LOW K    Narrative:     Performed at:  [facility]  [address] [place], [place] [address]   Phone [phone]   TROPONIN    Narrative:     Performed at:  [facility]  [address] [place] [address]   Phone [phone]   HCG, SERUM, QUALITATIVE    Narrative:     Performed at:  [facility]  [address] [place] [address]   Phone [phone]       All other labs were within normal range or not returned as of this dictation. EMERGENCY DEPARTMENT COURSE and DIFFERENTIAL DIAGNOSIS/MDM:   Vitals:    Vitals:    12/12/21 1413 12/12/21 1530   BP: (!) 146/112 124/85   Pulse: 118 104   Resp: 16    Temp: 98.4 °F (36.9 °C)    SpO2: 100% 100%   Weight: (!) 336 lb 3.2 oz (152.5 kg)            MDM  Number of Diagnoses or Management Options  Paroxysmal supraventricular tachycardia (HCC)  Diagnosis management comments: This patient has a past medical history of supraventricular tachycardia. She spontaneously converted in our emergency department today to sinus tachycardia. She is on beta-blocker twice daily and has not taken her evening dose. She will be discharged and will take her evening dose of her beta-blocker when she gets home and call the on-call cardiologist and see if they wish to adjust her rate control medications further. She is not pregnant. No significant anemia. No electrolyte abnormalities. No fever. No chest pain or shortness of breath. At this time I feel safe to discharge patient home. CRITICAL CARE TIME   Total Critical Care time was 20 minutes, excluding separately reportable procedures. There was a high probability of clinically significant/life threatening deterioration in the patient's condition which required my urgent intervention. CONSULTS:  None    PROCEDURES:  Unless otherwise noted below, none     Procedures    FINAL IMPRESSION      1.  Paroxysmal supraventricular tachycardia [facility])          DISPOSITION/PLAN   DISPOSITION Decision To Discharge 12/12/2021 03:43:32 PM      PATIENT REFERRED TO:  Your Cardiologist this evening  To see how they desire to adjust your rate control medications.           DISCHARGE MEDICATIONS:  Discharge Medication List as of 12/12/2021  4:03 PM      START taking these medications    Details   albuterol sulfate HFA (PROVENTIL HFA) 108 (90 Base) MCG/ACT inhaler Inhale 2 puffs into the lungs every 6 hours as needed for Wheezing, Disp-18 g, R-3Normal                (Please note that portions of this note were completed with a voice recognition program.Efforts were made to edit the dictations but occasionally words are mis-transcribed.)    Beau Leung MD (electronically signed)  Attending Emergency Physician         Beau Leung MD  12/12/21 16572 Ines Corcoran MD  12/14/21 9129

## 2021-12-12 NOTE — ED NOTES
Pt ambulated approx 50 feet and complained of increased SOB. Pt returned to room and placed on cardiac monitor. O2 sat was 100% and .      Janeth Seth RN  12/12/21 9524

## 2021-12-12 NOTE — ED NOTES
Pt HR was 225 when put on cardiac monitor, RN went to consult with MD and upon return patient had converted to sinus tach at approx [address], RN  12/12/21 8796

## 2021-12-12 NOTE — ED NOTES
Bed: E-41  Expected date: 12/12/21  Expected time:   Means of arrival:   Comments:  36 heart palpitations/ afib       Curry Dobbins RN  12/12/21 8737

## 2021-12-13 ENCOUNTER — APPOINTMENT (OUTPATIENT)
Dept: GENERAL RADIOLOGY | Age: 36
End: 2021-12-13
Payer: COMMERCIAL

## 2021-12-13 ENCOUNTER — HOSPITAL ENCOUNTER (EMERGENCY)
Age: 36
Discharge: HOME | End: 2021-12-13
Attending: EMERGENCY MEDICINE
Payer: COMMERCIAL

## 2021-12-13 ENCOUNTER — APPOINTMENT (OUTPATIENT)
Dept: CT IMAGING | Age: 36
End: 2021-12-13
Payer: COMMERCIAL

## 2021-12-13 ENCOUNTER — TELEPHONE (OUTPATIENT)
Dept: CARDIOLOGY CLINIC | Age: 36
End: 2021-12-13

## 2021-12-13 VITALS
DIASTOLIC BLOOD PRESSURE: 72 MMHG | WEIGHT: 293 LBS | OXYGEN SATURATION: 99 % | RESPIRATION RATE: 17 BRPM | TEMPERATURE: 99.7 F | SYSTOLIC BLOOD PRESSURE: 102 MMHG | HEART RATE: 71 BPM | BODY MASS INDEX: 43.4 KG/M2 | HEIGHT: 69 IN

## 2021-12-13 DIAGNOSIS — R07.9 ACUTE NONSPECIFIC CHEST PAIN WITH LOW RISK OF CORONARY ARTERY DISEASE: Primary | ICD-10-CM

## 2021-12-13 DIAGNOSIS — Z91.89 ACUTE NONSPECIFIC CHEST PAIN WITH LOW RISK OF CORONARY ARTERY DISEASE: Primary | ICD-10-CM

## 2021-12-13 DIAGNOSIS — Z86.79 HISTORY OF ATRIAL TACHYCARDIA: ICD-10-CM

## 2021-12-13 DIAGNOSIS — R00.2 PALPITATIONS: ICD-10-CM

## 2021-12-13 LAB
ANION GAP SERPL CALCULATED.3IONS-SCNC: 9 MMOL/L (ref 3–16)
BASOPHILS ABSOLUTE: 0.1 K/UL (ref 0–0.2)
BASOPHILS RELATIVE PERCENT: 0.9 %
BUN BLDV-MCNC: 14 MG/DL (ref 7–20)
CALCIUM SERPL-MCNC: 9.1 MG/DL (ref 8.3–10.6)
CHLORIDE BLD-SCNC: 104 MMOL/L (ref 99–110)
CO2: 26 MMOL/L (ref 21–32)
CREAT SERPL-MCNC: 0.6 MG/DL (ref 0.6–1.1)
EKG ATRIAL RATE: 117 BPM
EKG DIAGNOSIS: NORMAL
EKG P-R INTERVAL: 184 MS
EKG Q-T INTERVAL: 356 MS
EKG QRS DURATION: 146 MS
EKG QTC CALCULATION (BAZETT): 496 MS
EKG R AXIS: -20 DEGREES
EKG T AXIS: 2 DEGREES
EKG VENTRICULAR RATE: 117 BPM
EOSINOPHILS ABSOLUTE: 0.4 K/UL (ref 0–0.6)
EOSINOPHILS RELATIVE PERCENT: 5.9 %
GFR AFRICAN AMERICAN: >60
GFR NON-AFRICAN AMERICAN: >60
GLUCOSE BLD-MCNC: 85 MG/DL (ref 70–99)
HCG QUALITATIVE: NEGATIVE
HCT VFR BLD CALC: 35.9 % (ref 36–48)
HEMOGLOBIN: 11.7 G/DL (ref 12–16)
LYMPHOCYTES ABSOLUTE: 2 K/UL (ref 1–5.1)
LYMPHOCYTES RELATIVE PERCENT: 29.8 %
MCH RBC QN AUTO: 23.9 PG (ref 26–34)
MCHC RBC AUTO-ENTMCNC: 32.6 G/DL (ref 31–36)
MCV RBC AUTO: 73.3 FL (ref 80–100)
MONOCYTES ABSOLUTE: 0.6 K/UL (ref 0–1.3)
MONOCYTES RELATIVE PERCENT: 9.1 %
NEUTROPHILS ABSOLUTE: 3.7 K/UL (ref 1.7–7.7)
NEUTROPHILS RELATIVE PERCENT: 54.3 %
PDW BLD-RTO: 14.8 % (ref 12.4–15.4)
PLATELET # BLD: 361 K/UL (ref 135–450)
PMV BLD AUTO: 7.9 FL (ref 5–10.5)
POTASSIUM REFLEX MAGNESIUM: 4.8 MMOL/L (ref 3.5–5.1)
RBC # BLD: 4.89 M/UL (ref 4–5.2)
SODIUM BLD-SCNC: 139 MMOL/L (ref 136–145)
TROPONIN: <0.01 NG/ML
WBC # BLD: 6.8 K/UL (ref 4–11)

## 2021-12-13 PROCEDURE — 93005 ELECTROCARDIOGRAM TRACING: CPT | Performed by: PHYSICIAN ASSISTANT

## 2021-12-13 PROCEDURE — 6360000004 HC RX CONTRAST MEDICATION: Performed by: EMERGENCY MEDICINE

## 2021-12-13 PROCEDURE — 85025 COMPLETE CBC W/AUTO DIFF WBC: CPT

## 2021-12-13 PROCEDURE — 99284 EMERGENCY DEPT VISIT MOD MDM: CPT

## 2021-12-13 PROCEDURE — 6370000000 HC RX 637 (ALT 250 FOR IP): Performed by: EMERGENCY MEDICINE

## 2021-12-13 PROCEDURE — 84703 CHORIONIC GONADOTROPIN ASSAY: CPT

## 2021-12-13 PROCEDURE — 71045 X-RAY EXAM CHEST 1 VIEW: CPT

## 2021-12-13 PROCEDURE — 71260 CT THORAX DX C+: CPT

## 2021-12-13 PROCEDURE — 93010 ELECTROCARDIOGRAM REPORT: CPT | Performed by: INTERNAL MEDICINE

## 2021-12-13 PROCEDURE — 80048 BASIC METABOLIC PNL TOTAL CA: CPT

## 2021-12-13 PROCEDURE — 84484 ASSAY OF TROPONIN QUANT: CPT

## 2021-12-13 RX ORDER — DILTIAZEM HYDROCHLORIDE 120 MG/1
120 CAPSULE, COATED, EXTENDED RELEASE ORAL DAILY
Qty: 30 CAPSULE | Refills: 0 | Status: SHIPPED | OUTPATIENT
Start: 2021-12-13 | End: 2022-03-10 | Stop reason: ALTCHOICE

## 2021-12-13 RX ORDER — ASPIRIN 81 MG/1
324 TABLET, CHEWABLE ORAL ONCE
Status: COMPLETED | OUTPATIENT
Start: 2021-12-13 | End: 2021-12-13

## 2021-12-13 RX ADMIN — ASPIRIN 81 MG 324 MG: 81 TABLET ORAL at 18:16

## 2021-12-13 RX ADMIN — IOPAMIDOL 75 ML: 755 INJECTION, SOLUTION INTRAVENOUS at 18:45

## 2021-12-13 ASSESSMENT — ENCOUNTER SYMPTOMS
SHORTNESS OF BREATH: 1
ABDOMINAL PAIN: 0
DIARRHEA: 0
CONSTIPATION: 0
CHEST TIGHTNESS: 0
NAUSEA: 0
VOMITING: 0

## 2021-12-13 ASSESSMENT — PAIN DESCRIPTION - PAIN TYPE: TYPE: ACUTE PAIN

## 2021-12-13 ASSESSMENT — HEART SCORE: ECG: 0

## 2021-12-13 ASSESSMENT — PAIN DESCRIPTION - LOCATION: LOCATION: CHEST

## 2021-12-13 ASSESSMENT — PAIN SCALES - GENERAL: PAINLEVEL_OUTOF10: 3

## 2021-12-13 NOTE — TELEPHONE ENCOUNTER
Pt called she stated she went to the ER, yestereday because  her heart was fluttering, she stated feels tightness in chest HR and BP was all over    pt is sending transmission   She stated she is going back to the ER this morning, her BP is 141/96 HR 69. She feels like something is not right.     Pl advise thank you

## 2021-12-13 NOTE — TELEPHONE ENCOUNTER
Pt has sent multiple transmissions since weekend. Pt is in atrial tach. Report sent to cardiology tab.

## 2021-12-13 NOTE — ED NOTES
Bed: 11  Expected date:   Expected time:   Means of arrival:   Comments:  Mehul Broderick RN  12/13/21 9631

## 2021-12-13 NOTE — ED PROVIDER NOTES
[address] [facility]  eMERGENCY dEPARTMENT eNCOUnter    Pt Name: Tess Cardoso  MRN: [id number]  Raulgfoliver 1985  Date of evaluation: 12/13/2021  Provider: FOSTER Brown    Chief Complaint:    Chief Complaint   Patient presents with    Chest Pain     chest pain, fluttering chest, vtach/afib yesterday at [facility], SOB       Nursing Notes, Past Medical Hx, Past Surgical Hx, Social Hx, Allergies, and Family Hx were all reviewed and agreedwith or any disagreements were addressed in the HPI.    HPI:  (Location, Duration, Timing, Severity, Quality, Assoc Sx, Context, Modifying factors)  This is a  39 y.o. female who presents to the emergency department with complaints of mild pinging chest pains ongoing since yesterday. Fluttering in chest.  Has a history of of SVT. Was seen yesterday at [facility] but converted into sinus tachycardia at time of evaluation. Was seen at [facility].  At the time was having shortness of breath. Was discharged home. She had apparently forgot to take a dose of her metoprolol and sotalol. She did take her medication. Has a pacemaker in place as well. She has a significant cardiac history including ventricular septal defect repaired at 4 months, pacemaker placement in 2019. Patient states that she still has little pangs of chest pain here and there. Called her cardiologist, Dr. Riwzana Morris told them that she was coming to the ER for reevaluation to make sure everything was complete. Denies any aggravating or alleviating factors. Pain is 3 out of 10 in severity and does not radiate. All other systems were reviewed and are negative.      Past Medical/Surgical History:      Diagnosis Date    Asthma     CHF (congestive heart failure) (HCC)     Congenital heart defect     Migraines, basilar     Palpitations     Sciatica     lower right    VSD (ventricular septal defect and aortic arch hypoplasia          Procedure Laterality Date    CARDIAC SURGERY      ventricular septal defect repair at 4 months    PACEMAKER PLACEMENT  07/31/2019    TONSILLECTOMY      and adnoids removed    VSD REPAIR      baby       Medications:  Discharge Medication List as of 12/13/2021  8:07 PM      CONTINUE these medications which have NOT CHANGED    Details   albuterol sulfate HFA (PROVENTIL HFA) 108 (90 Base) MCG/ACT inhaler Inhale 2 puffs into the lungs every 6 hours as needed for Wheezing, Disp-18 g, R-3Normal      metoprolol succinate (TOPROL XL) 100 MG extended release tablet Take 1 tablet by mouth daily, Disp-90 tablet, R-3Normal      sotalol (BETAPACE) 80 MG tablet Take 1.5 tablets by mouth 2 times daily, Disp-90 tablet, R-2Normal      aspirin 81 MG tablet Take 81 mg by mouth dailyHistorical Med               Review of Systems:  Review of Systems   Constitutional: Negative for chills and fever. Respiratory: Positive for shortness of breath. Negative for chest tightness. Cardiovascular: Positive for chest pain. Gastrointestinal: Negative for abdominal pain, constipation, diarrhea, nausea and vomiting. All other systems reviewed and are negative. Positives and Pertinent negatives as per HPI. Except as noted above in the ROS, all other systems were reviewed/completed and are negative. Physical Exam:  Physical Exam  Vitals and nursing note reviewed. Constitutional:       Appearance: Normal appearance. She is well-developed. She is not toxic-appearing or diaphoretic. HENT:      Head: Normocephalic. Right Ear: External ear normal.      Left Ear: External ear normal.      Nose: Nose normal.   Eyes:      General:         Right eye: No discharge. Left eye: No discharge. Conjunctiva/sclera: Conjunctivae normal.   Cardiovascular:      Rate and Rhythm: Normal rate and regular rhythm. Heart sounds: Normal heart sounds. No murmur heard. No friction rub. No gallop.     Pulmonary:      Effort: Pulmonary effort is normal. No respiratory findings:    Interpretation per the Radiologist below, if available at the time of this note:    CT CHEST PULMONARY EMBOLISM W CONTRAST   Final Result   No acute pulmonary embolus. No acute cardiopulmonary disease. Mild cardiomegaly. Hepatic steatosis. RECOMMENDATIONS:   Unavailable         XR CHEST PORTABLE   Final Result   Stable mild cardiomegaly, without acute airspace consolidation or CHF. MEDICAL DECISION MAKING / ED COURSE:      PROCEDURES:   Procedures    Patient was given:  Medications   aspirin chewable tablet 324 mg (324 mg Oral Given 12/13/21 1816)   iopamidol (ISOVUE-370) 76 % injection 75 mL (75 mLs IntraVENous Given 12/13/21 1845)     Patient presents to the emergency department with intermittent chest pains, this is occurred ever since she had SVT yesterday. Has a history of SVT. States that she does not believe she received a full work-up from [facility] yesterday including a chest x-ray. She states blood work and EKG were done. Reevaluation of blood work, chest x-ray and EKG here are unremarkable. No evidence of acute airspace consolidation or CHF on chest x-ray. Patient will be discharged home, normal sinus rhythm on EKG. CT PE study shows no evidence of acute PE or other acute cardiopulmonary disease. The patient presents with chest pain. The patient has been evaluated and the history and physical exam suggest a benign etiology. I see nothing to suggest coronary ischemia, myocardial infarction, pulmonary embolism, thoracic aortic dissection, significant pericarditis, pneumonia, pneumothorax, or acute abdomen. I feel the patient can be safely discharged to home with outpatient follow up. Instructions have been given for the patient to return to the ED for any worsening of the symptoms, including but not limited to increased pain, shortness of breath, abdominal pain or weakness. The patient tolerated their visit well.   I have evaluated the patient with physician available for consultation as needed. I have discussed the findings of today's workup with the patient and addressed the patient's questions and concerns. Important warning signs as well as new or worsening symptoms which wouldnecessitate immediate return to the ED were discussed. The plan is to discharge from the ED at this time, and the patient is in stable condition. The patient acknowledged understanding is agreeable with this plan. CLINICAL IMPRESSION:  1. Acute nonspecific chest pain with low risk of coronary artery disease    2. Palpitations    3. History of atrial tachycardia        DISPOSITION  Discharged home. Stable.       PATIENT REFERRED TO:  Destiny James MD  [address] [facility] [address]  [phone]    Schedule an appointment as soon as possible for a visit       [facility]  [address]  Go to   If symptoms worsen      DISCHARGE MEDICATIONS:  Discharge Medication List as of 12/13/2021  8:07 PM      START taking these medications    Details   dilTIAZem (CARDIZEM CD) 120 MG extended release capsule Take 1 capsule by mouth daily, Disp-30 capsule, R-0Print                    (Please note the MDM and HPI sections of this note were completed with avoice recognition program.  Efforts were made to edit the dictations but occasionally words are mis-transcribed.)    Electronically signed, FOSTER Velasquez,             FOSTER Crawford  12/14/21 9090

## 2021-12-14 ENCOUNTER — TELEPHONE (OUTPATIENT)
Dept: CARDIOLOGY CLINIC | Age: 36
End: 2021-12-14

## 2021-12-14 LAB
EKG ATRIAL RATE: 70 BPM
EKG DIAGNOSIS: NORMAL
EKG P AXIS: 67 DEGREES
EKG P-R INTERVAL: 160 MS
EKG Q-T INTERVAL: 440 MS
EKG QRS DURATION: 146 MS
EKG QTC CALCULATION (BAZETT): 475 MS
EKG R AXIS: -13 DEGREES
EKG T AXIS: 30 DEGREES
EKG VENTRICULAR RATE: 70 BPM

## 2021-12-14 PROCEDURE — 93010 ELECTROCARDIOGRAM REPORT: CPT | Performed by: INTERNAL MEDICINE

## 2021-12-14 NOTE — TELEPHONE ENCOUNTER
Pt called stating she went to ER, they gave her a medication dilTIAZem (CARDIZEM CD) 120 MG pt wants to know if it is ok to take it, pt would like to know if she needs a sooner appt she is ready to move forward with defibrillator if all possible she wants to come in and talk about with MXA.     Pls provide date and time the pt can get in

## 2021-12-14 NOTE — ED PROVIDER NOTES
unknown if currently breastfeeding. For further details of [facility] emergency department encounter, please see documentation by advanced practice provider, FOSTER Ellis.     Lizzy Bustos DO (electronically signed)  Attending Emergency Physician       Lizzy Bustos DO  12/14/21 0010

## 2022-02-28 ENCOUNTER — NURSE ONLY (OUTPATIENT)
Dept: CARDIOLOGY CLINIC | Age: 37
End: 2022-02-28
Payer: COMMERCIAL

## 2022-02-28 DIAGNOSIS — I44.2 CHB (COMPLETE HEART BLOCK) (HCC): ICD-10-CM

## 2022-02-28 DIAGNOSIS — Z95.0 CARDIAC PACEMAKER IN SITU: ICD-10-CM

## 2022-02-28 PROCEDURE — 93296 REM INTERROG EVL PM/IDS: CPT | Performed by: INTERNAL MEDICINE

## 2022-02-28 PROCEDURE — 93294 REM INTERROG EVL PM/LDLS PM: CPT | Performed by: INTERNAL MEDICINE

## 2022-02-28 NOTE — PROGRESS NOTES
We received remote transmission from patient's monitor at home. Transmission shows normal sensing and pacing function. Noted SVT, NSVT and AF. Pt is on Toprol. She is not on anti coags. EP physician will review. See interrogation under cardiology tab in the [address] [facility] [address] field for more details.

## 2022-03-08 ENCOUNTER — TELEPHONE (OUTPATIENT)
Dept: CARDIOLOGY CLINIC | Age: 37
End: 2022-03-08

## 2022-03-08 NOTE — TELEPHONE ENCOUNTER
Pt called stating she was in AFIB from 7 pm to 12-1 am, it was in and out there was pain in the middle of her chest, she stated she sent a transmission this morning.     Pls advise thank you

## 2022-03-08 NOTE — TELEPHONE ENCOUNTER
Called patient she states that she went to [facility] ED on 12/13/21    Chest pain, unspecified type    2. Chest pain with normal EKG    3. Acute nonspecific chest pain with low risk of coronary artery disease      She called our office 12/14/22 to find out if she should take the diltiazem. She states nobody called her back. She has never picked up diltiazem because she never heard from us. She bought an apple watch to monitor her,and is concerned since she needs an CD. She saw Marissa Wyatt yesterday at [facility] [address] yesterday. She is not satisfied with us getting back to her. Verified medications she is on metoprolol 100 mg daily ,and sotalol 120mg bid

## 2022-03-08 NOTE — TELEPHONE ENCOUNTER
We received remote transmission from patient's monitor at home. Transmission shows normal sensing and pacing function. Current ECG ASVS at 66 bpm  Battery life 12.5 years  AP 0.3%.  < 0.1%  1 NSVT episodes noted on 3/4/2022 lasting 1 second. 11 Fast A&V episodes noted. Last on 3/7/2022, longest 7 1/2 minutes. Max V 276 bpm.  1 AT/AF episode noted on 3/7/2022 lasting 6 hours. OBSERVATIONS (1)  · AT/AF >= 0.5 hr for 2 days. Patient remains on ASA 81 mg, metoprolol, Cardizem and sotalol. EP will review. See interrogation under cardiology tab in the [address] [facility] [address] field for more details. Please advise.

## 2022-03-08 NOTE — TELEPHONE ENCOUNTER
Can please call and make sure she Taking her medication. I will speak with dr. Santino Mejia tomorrow and call her back with a plan. Please have her call if her symptoms get worse.

## 2022-03-09 NOTE — TELEPHONE ENCOUNTER
Spoke with Dr. Gillian Clements. He would like her to take cardizem. She will have VV with MXA tomorrow at 245 p

## 2022-03-09 NOTE — PROGRESS NOTES
[facility]   Electrophysiology Virtual Visit   Date: 3/10/2022  I had the privilege of visiting Jesus Solis in the office. CC: Palpitations   HPI: Jesus Solis is a 39 y.o. female with pmh repaired DORV with LV dysfunction. She underwent interventricular tunnel VSD closure in 1985. 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 She developed atrial tachycardia in 2010 and she underwent successful cardioversion. She is referred today for further evaluation of palpitations. She reports she has intermittent palpitations that make her acutely SOB and dizzy, no arrhythmias have been captured by EKG. She reports the episodes last 5-7 minutes in duration.     8/2/19: Insertion of dual chamber pacemaker for CHB     Friday 9/27/19 she had chest pain and went to the ED and they discharged her. Saturday she had episodes of shortness of breath and her heart racing and she felt like she could barely walk. She laid down and rested and the episode eased up. She said when she was driving here she felt light headed. Her OB/GYN told her she could not take Lasix because of the amniotic fluid could be affected.     On 10/2/19 she had a hospital admission for asthma related symptoms with treatment including albuterol, prednisone and corticosteroid inhaler. She had episodes of atrial fib/flutter with aRVR.     12/23/2020 Presented to the ED with dental pain, chest pain and Shortness of breath    12/14/2021 she presented to [facility] ED with complaints of palpitations. Discharged on diltiazem but patient did not start rhe medication at that time. 3/7/2022 she did follow up with [facility] cardiology had an echo the same day as her appointment     Deangelo Leda presents to the office via virtual visit today. She has complaints of palpitations and chest pain. Her episodes of symptomatic atrial fibrillation are becoming more frequent and increasing in symptoms.      Assessment and plan:   Atrial fibrillation/flutter with rapid ventricular response  Atrial tachycardia   -Feels as though her symptoms are increasing in intensity and duration   -Episodes of Atrial flutter/AT on her device interrogation    -Her options at this time are increasing her sotalol to 160 mg BID (this is max dosage) or proceed with an EPS and ablation   -She would like to proceed with increasing her sotalol to 160 mg BID and scheduling for an EPS and ablation    -Increase sotalol to 160 mg BID, start Scottie 3/13/2022. Will have ECG done Monday 3/14/2022 and Tuesday 3/15/2022 at [facility] and if she has changes to QTc we will have to decrease dose back to 120 mg BID   -Symptomatic with these episodes, resolve with rest.   -Toprol XL 75 mg, sotalol 120 mg BID    -No anticoagulation for now      He has been having more episodes of atrial tachycardia, atrial flutter that later seem to be going to hours what appears to be regular atrial fibrillation. His episodes are very symptomatic and lasts longer up to 6 hours. She is on 120 mg of sotalol twice a day. We discussed the options including increasing the dose of sotalol to 160 mg twice daily. He can try amiodarone. We can try ablation. At this point she prefers to proceed with ablation but meanwhile we will increase the dose of sotalol to see if we can control these episodes better. If sotalol alone is not able to control it, we can consider adding diltiazem to help control the heart rate when she has the episodes. As far as the ablation strategy goes, I will proceed with trying to induce atrial tachycardia or flutter. If he does use such rhythm consistently, we will proceed with ablating it. However we may decide to proceed with ablation of atrial fibrillation if the findings indicate that the source of rhythms are from left atrium and pulmonary veins or if he induce atrial fibrillation during EP study.     NSVT    -Continue BB   -Increase sotalol -Symptomatic with even short episodes     She had a few more episodes during late July and late August.  However she has not consistently had any since then. We will continue to monitor them. The longest nonsustained VT was 6 seconds. Her most recent echo showed normal LV function without any significant change except for increasing TR severity to moderate. We will continue to monitor. I also asked her to let us know if her symptoms change. We will continue to monitor remotely. For now we will continue with treating medically. High grade AV block   -S/p Dual chamber PPM 7/31/19   -The CIED was interrogated and programmed and I supervised and reviewed all the data. All findings and changes are in device interrogation sheet and reflect my personal interpretation and changes and is scanned to Epic.    - 12.5years remaining, AP 7.2% ,  <0.1%      Syncope/Pause   -No further episodes. Did have presyncope   -Encouraged supportive measures including maintaining hydration   - s/p Permanent pacemaker     Congenital Heart disease   -Stable. Followed [facility]    -s/p interventricular tunnel VSD closure, 10/17/85    -s/p resection of RV muscle bundles, 8/10/00. dyspnea on exertion   -Normal ventricular function    Obesity    -Excessive weight is complicating assessment and treatment.  It is placing patient at risk for multiple co-morbidities as well as early death and contributing to the patient's presentation.  -Discussed weight management with diet and exercise            Plan:   Increase sotalol to 160 mg BID Sunday, have ECG done Monday 3/14/2022 and Tuesday 3/15/2022  Schedule EPS and ablation, we will remove her ILR same day      Patient Active Problem List    Diagnosis Date Noted    NSVT (nonsustained ventricular tachycardia) ([address]     Chest pain 08/28/2019    Palpitation     Dyspnea and respiratory abnormalities     Chronic diastolic heart failure ([address]     Pacemaker     [facility] 07/31/2019    PAF (paroxysmal atrial fibrillation) ([facility])      Diagnostic studies:   ECG:3/10/22  None VV    Echo 3/7/2022   1. 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    2. Moderate tricuspid valve regurgitation.    3. Trivial aortic valve regurgitation.    4. Mild LVOT narrowing secondary to interventricular tunnel repair with a possible subaortic membrane (limited visualization due to poor acoustic windows), mean gradient 11 mmHg.    5. No evidence of right ventricular hypertension.    6. Right ventricle is mildly dilated and the systolic function is normal.    7. Left ventricle is normal in size and the systolic function is normal.    8. There is no significant pericardial effusion.    9. Compared to the previous echocardiogram of 3/18/2021, there is no significant change.         ECHO 03/11/2020 [facility]   1. Double outlet right ventricle with normally related great vessels and a doubly committed VSD      -s/p interventricular tunnel VSD closure, 10/17/85      -s/p resection of RV muscle bundles, 8/10/00.   2. Mild LVOT narrowing secondary to interventricular tunnel repair, mean gradient 14 mmHg, previously 22 mmHg. 3. Trivial aortic valve regurgitation. 4. Moderate tricuspid valve regurgitation. 5. No right ventricular hypertension. 6. Right ventricle is mildly dilated and the systolic function is normal.   7. Left ventricle is normal in size and the systolic function is normal.   8. There is no significant pericardial effusion. 9. Compared to the previous echocardiogram of 11/11/2019, the patient is post-partum and the LVOT gradient has decreased. There is a possible subaortic membrane in the LVOT.     Echo:  8/28/19  Summary   -Normal left ventricle size, wall thickness, and systolic function with an   estimated ejection fraction of 55%.    -Abnormal septal motion.   -Trivial mitral regurgitation.   -Patient has a history of a ventricular septal defect and VSD repair.   -Patient has a history of interventricular tunnel repair.   -Patient has a history of interventricular tunnel repair. The aortic valve   is poorly visualized. There is a fixed gradient through the LV outflow area.   The peak gradient is estimated at 47 mmHg with a mean pressure gradient of   27 mmHg. It is unclear if the obstruction is due to the VSD repair or if  Rebolledo Bigger is some aortic stenosis. The obstruction is moderate.   -Normal diastolic function. Avg. E/e=8.65   -Bubble study performed with no obvious right to left shunt.   -Pacemaker wire noted in the right heart.     Echo 7/18/19  Summary:   1. Double outlet right ventricle with normally related great vessels and a doubly committed VSD      -s/p interventricular tunnel VSD closure, 10/17/85      -s/p resection of RV muscle bundles, 8/10/00.   2. Mild LVOT narrowing secondary to interventricular tunnel repair, peak gradient 28 mmHg, mean gradient 14 mmHg. 3. No aortic valve regurgitation. 4. Left ventricle is normal in size and the systolic function is normal.   5. Mild tricuspid valve regurgitation. 6. Right ventricle is mildly dilated and the systolic function is normal.   7. The ascending aorta, transverse arch and descending aorta are unobstructed. 8. There is no significant pericardial effusion. 9. Compared to the previous echocardiogram of 3/28/2018, there is no longer RV hypertension. I independently reviewed the cardiac diagnostic studies, ECG and relevant imaging studies. Lab Results   Component Value Date    LVEF 55 08/28/2019     No results found for: TSHFT4, TSH      Physical Examination:  There were no vitals filed for this visit.    Wt Readings from Last 3 Encounters:   12/13/21 (!) 330 lb 9.6 oz (150 kg)   12/12/21 (!) 336 lb 3.2 oz (152.5 kg)   10/07/21 (!) 321 lb (145.6 kg)       · Constitutional: Oriented. No distress. · Head: Normocephalic and atraumatic. · Mouth/Throat:    · Neurological: Alert and oriented. · Psychiatric: Has a normal behavior       Review of System:  [x] Full ROS obtained and negative except as mentioned in HPI    Prior to Admission medications    Medication Sig Start Date End Date Taking? Authorizing Provider   sotalol (BETAPACE) 160 MG tablet Take 1 tablet by mouth 2 times daily 3/10/22  Yes Ayde Caldwell MD   albuterol sulfate HFA (PROVENTIL HFA) 108 (90 Base) MCG/ACT inhaler Inhale 2 puffs into the lungs every 6 hours as needed for Wheezing 12/12/21  Yes Radha Tabor MD   metoprolol succinate (TOPROL XL) 100 MG extended release tablet Take 1 tablet by mouth daily 5/26/21  Yes Ayde Caldwell MD   aspirin 81 MG tablet Take 81 mg by mouth daily   Yes Historical Provider, MD       Allergies   Allergen Reactions    Caffeine Hives, Palpitations and Other (See Comments)     Other reaction(s): Other (See Comments)  migraine  headaches      Red Dye Hives, Palpitations and Headaches     Other reaction(s): Other (See Comments)  headaches      Other      Migraines       Social History:  Reviewed. reports that she has never smoked. She has never used smokeless tobacco. She reports current alcohol use. She reports that she does not use drugs. Family History:  Reviewed. Reviewed. No family history of SCD. Relevant and available labs, and cardiovascular diagnostics reviewed. Reviewed. I independently reviewed relevant and available cardiac diagnostic tests ECG, CXR, Echo, Stress test, Device interrogation, Holter, CT scan. Outside medical records via Care everywhere reviewed and summarized in H&P above. Complex medical condition with multiple medical problems affecting prognosis and outcome of EP interventions          - The patient is counseled to avoid excess caffeine, and energy drinks as this may exacerbated ectopy and arrhythmia.    - The patient is counseled to get regular exercise 3-5 times per week to control cardiovascular risk factors. - The patient is counseled to lose weigt to control cardiovascular risk factors. All questions and concerns were addressed to the patient/family. Alternatives to my treatment were discussed. I have discussed the above stated plan and the patient verbalized understanding and agreed with the plan. Scribe attestation: This note was scribed in the presence of Boris An MD by Ranulfo Christian RN    I, Dr. Boris An personally performed the services described in this documentation as scribed by RN in my presence, and it is both accurate and complete.        NOTE: This report was transcribed using voice recognition software. Every effort was made to ensure accuracy, however, inadvertent computerized transcription errors may be present. Boris An MD, [place] [address]   Office: [phone]  Fax: [phone]      Renae Carmichael, was evaluated through a synchronous (real-time) audio-video encounter. The patient (or guardian if applicable) is aware that this is a billable service, which includes applicable co-pays. This Virtual Visit was conducted with patient's (and/or legal guardian's) consent. The visit was conducted pursuant to the emergency declaration under the [address] [place] [address], [address] [place] [address] [facility] authority and the [employer] General Act. Patient identification was verified, and a caregiver was present when appropriate. The patient was located at home in a state where the provider was licensed to provide care. Total time spent for this encounter: Not billed by time    --Boris An MD on 3/10/2022 at 3:32 PM    An electronic signature was used to authenticate this note.

## 2022-03-10 ENCOUNTER — TELEMEDICINE (OUTPATIENT)
Dept: CARDIOLOGY CLINIC | Age: 37
End: 2022-03-10
Payer: COMMERCIAL

## 2022-03-10 DIAGNOSIS — I48.0 PAF (PAROXYSMAL ATRIAL FIBRILLATION) (HCC): Primary | ICD-10-CM

## 2022-03-10 DIAGNOSIS — Z95.0 PACEMAKER: ICD-10-CM

## 2022-03-10 DIAGNOSIS — I44.2 CHB (COMPLETE HEART BLOCK) (HCC): ICD-10-CM

## 2022-03-10 DIAGNOSIS — I47.29 NSVT (NONSUSTAINED VENTRICULAR TACHYCARDIA): ICD-10-CM

## 2022-03-10 PROCEDURE — 99215 OFFICE O/P EST HI 40 MIN: CPT | Performed by: INTERNAL MEDICINE

## 2022-03-10 RX ORDER — SOTALOL HYDROCHLORIDE 160 MG/1
160 TABLET ORAL 2 TIMES DAILY
Qty: 60 TABLET | Refills: 3 | Status: SHIPPED | OUTPATIENT
Start: 2022-03-10

## 2022-03-10 RX ORDER — SOTALOL HYDROCHLORIDE 160 MG/1
TABLET ORAL
Qty: 180 TABLET | OUTPATIENT
Start: 2022-03-10

## 2022-03-10 RX ORDER — DILTIAZEM HYDROCHLORIDE 120 MG/1
120 CAPSULE, COATED, EXTENDED RELEASE ORAL DAILY
Qty: 30 CAPSULE | Refills: 0 | Status: CANCELLED | OUTPATIENT
Start: 2022-03-10 | End: 2022-04-09

## 2022-03-10 NOTE — LETTER
[facility]  EP Procedure Sheet    3/10/22  [facility] Mail  1985  EP Procedures  [] Pacemaker implant (single/dual) [x] EP Study   [] ICD implant (single/dual) [x] Atrial flutter ablation (MEJIA Y/N)   [] Biv implant ICD [] Tilt Table   [] Biv implant PPM [x] Atrial fibrillation ablation (MEJIA Yes)   [] Generator Change (PPM/ICD/BiV) [x] SVT ablation, possible flutter    [] Lead revision (RV/LA/RA) (<1 month) [] PVC ablation     [] Lead extraction +/- upgrade (BiV/PPM/ICD) [] VT Ischemic/ non-ischemic   [] Loop implant/ removal [] VT RVOT   [] Cardioversion [] VT Left sided   [] MEJIA [] AVN ablation   Equipment  [] Medtronic  [] JULIA Mapping System   [] [address] [x] [address]   [] [employer] [] CryoAblation   [] Biotronik [] Laser Lead Extraction   EP Procedures Scheduling Request  # hours Requested  []1 []2 [x]2-4 [] 4-6 Scheduled  Date:   Specific Day  Completed    Anesthesia [x]yes []no F/u Date:   CT surgery backup []yes [x]no COVID     Overnight stay      Performing MD  []RMM [x]MXA   []MKW [] Other _______ First vs repeat   [x]1st [] 2nd [] 3rd   Pre-Procedure Labs / Imaging  [] PT/INR [] Type & cross   [] CBC [] Units PRBC   [] BMP/Mg [] Units FFP   [] Venogram [] Cardiac CTA for Pulmonary vein mapping     RN INITIALS: RA    Patient Instructions  Do not eat or drink after midnight the night prior to procedure  Dx:SVT ICD-10 code: I47.1

## 2022-03-15 ENCOUNTER — TELEPHONE (OUTPATIENT)
Dept: CARDIOLOGY CLINIC | Age: 37
End: 2022-03-15

## 2022-03-15 NOTE — TELEPHONE ENCOUNTER
Pt calling stating that her pcp sent her to get an ekg done but she is also going to send in a transmission, she is having sob and tightness in her chest    She is now with the NP at [facility], she is sending a transmission now and the NP wants to know if it showed anything

## 2022-03-15 NOTE — TELEPHONE ENCOUNTER
Called Robyn    ECG at [facility] 3/15/2022       OR-INTERVAL (MSEC) ms 156    QRS-INTERVAL (MSEC) ms 144    QT-INTERVAL (MSEC) ms 452    QTC ms 491        When she is walking, moving or bending over she feels short of breath. Symptoms are different than what she was feeling last week. Short of breath when she is talking. Not feeling as much fluttering but increased shortness of breath and tightness in chest on exertion which is different for her.

## 2022-03-16 ENCOUNTER — TELEPHONE (OUTPATIENT)
Dept: CARDIOLOGY CLINIC | Age: 37
End: 2022-03-16

## 2022-03-16 NOTE — TELEPHONE ENCOUNTER
Pt was taken off the Sotalol 160 mg, and resumed the 120 mg. Asking if she still needs an EKG today.

## 2022-03-16 NOTE — TELEPHONE ENCOUNTER
Gray Aragon spoke with patient. Will try to do her ablation sooner as she is highly symptomatic with episodes.  Spoke to Makenzie our  and she will work on scheduling patient when she is back in the office 3/17/2022, will try to add her 3/22/2022 for ablation

## 2022-03-17 ENCOUNTER — TELEPHONE (OUTPATIENT)
Dept: CARDIOLOGY CLINIC | Age: 37
End: 2022-03-17

## 2022-03-17 DIAGNOSIS — Z01.818 PRE-OP TESTING: Primary | ICD-10-CM

## 2022-03-17 NOTE — TELEPHONE ENCOUNTER
Called pt about the message below and she stated that she had chest pain that was going down her left arm ( comes and goes) per NPAL if her symptoms are worse go to the ED if not she can sent a device transmission. Patient verbalized understanding and will either go to the ED or sent the transmission.

## 2022-03-17 NOTE — TELEPHONE ENCOUNTER
Patient states she is also having SOB today. Please advise pt if needed. Spoke with patient. Patient is scheduled with Dr. Darshan Wick for MEJIA/AF/AFL/SVT Ablation with Carto and anesthesia (conf thru email) on 3/22/22 at 7:30am MFF, arrival time of 6:45am to the Cath Lab. Please have patient arrive to the main entrance of [facility] and check in with the Cath Lab. Please call patient regarding medication instructions. Remind patient to be NPO after midnight (8 hours prior). Do not apply lotions/creams on skin the day of procedure. COVID testing - (NEED ORDER IN CHART) or   1. Megan (C/ [address]) (Outreach Car Covid Collection) :  ? Front of the [facility] - designated parking spots with a phone number to call for service. ADDRESS:  [address] [place] [address],[address] [address] phone [phone]; Fax [phone]  --  M-F [id number]. · No appointment needed. [facility] Pre-Procedure Covid collection   Greater than 3 days of their scheduled procedure Megan will collect. Within 3 days of a scheduled procedure, do not direct patient to the Laboratory Outreach Drawsite. Patient will need to present to the facility the procedure will be performed or to their Primary Care Doctor, [address], Urgent Care, [place] [address], [facility], etc.      [facility], [facility] Covid Collection)  ADDRESS:  [address] [address] phone [phone]; [phone] --  M-F Noon - 4:00p. Around Back of the [facility] -designated parking spots with a phone number to call for service.

## 2022-03-17 NOTE — TELEPHONE ENCOUNTER
covid order placed. Please call if patients symptoms are worse than they have been or if she is having any other symptoms.

## 2022-03-18 DIAGNOSIS — Z01.818 PRE-OP TESTING: ICD-10-CM

## 2022-03-19 LAB — SARS-COV-2: NOT DETECTED

## 2022-03-22 ENCOUNTER — ANESTHESIA EVENT (OUTPATIENT)
Dept: CARDIAC CATH/INVASIVE PROCEDURES | Age: 37
End: 2022-03-22
Payer: COMMERCIAL

## 2022-03-22 ENCOUNTER — HOSPITAL ENCOUNTER (OUTPATIENT)
Dept: CARDIAC CATH/INVASIVE PROCEDURES | Age: 37
Discharge: HOME | End: 2022-03-22
Attending: INTERNAL MEDICINE | Admitting: INTERNAL MEDICINE
Payer: COMMERCIAL

## 2022-03-22 ENCOUNTER — ANESTHESIA (OUTPATIENT)
Dept: CARDIAC CATH/INVASIVE PROCEDURES | Age: 37
End: 2022-03-22
Payer: COMMERCIAL

## 2022-03-22 VITALS
RESPIRATION RATE: 10 BRPM | TEMPERATURE: 97.2 F | SYSTOLIC BLOOD PRESSURE: 150 MMHG | DIASTOLIC BLOOD PRESSURE: 74 MMHG | OXYGEN SATURATION: 97 %

## 2022-03-22 VITALS
WEIGHT: 293 LBS | DIASTOLIC BLOOD PRESSURE: 71 MMHG | HEIGHT: 69 IN | OXYGEN SATURATION: 95 % | SYSTOLIC BLOOD PRESSURE: 108 MMHG | RESPIRATION RATE: 7 BRPM | BODY MASS INDEX: 43.4 KG/M2 | HEART RATE: 63 BPM | TEMPERATURE: 97.1 F

## 2022-03-22 LAB
A/G RATIO: 1.3 (ref 1.1–2.2)
ABO/RH: NORMAL
ALBUMIN SERPL-MCNC: 3.9 G/DL (ref 3.4–5)
ALP BLD-CCNC: 51 U/L (ref 40–129)
ALT SERPL-CCNC: 25 U/L (ref 10–40)
ANION GAP SERPL CALCULATED.3IONS-SCNC: 10 MMOL/L (ref 3–16)
ANTIBODY SCREEN: NORMAL
AST SERPL-CCNC: 21 U/L (ref 15–37)
BASOPHILS ABSOLUTE: 0.1 K/UL (ref 0–0.2)
BASOPHILS RELATIVE PERCENT: 1.2 %
BILIRUB SERPL-MCNC: <0.2 MG/DL (ref 0–1)
BUN BLDV-MCNC: 13 MG/DL (ref 7–20)
CALCIUM SERPL-MCNC: 9.1 MG/DL (ref 8.3–10.6)
CHLORIDE BLD-SCNC: 106 MMOL/L (ref 99–110)
CO2: 22 MMOL/L (ref 21–32)
CREAT SERPL-MCNC: 0.6 MG/DL (ref 0.6–1.1)
EKG ATRIAL RATE: 67 BPM
EKG DIAGNOSIS: NORMAL
EKG P AXIS: 48 DEGREES
EKG P-R INTERVAL: 190 MS
EKG Q-T INTERVAL: 450 MS
EKG QRS DURATION: 146 MS
EKG QTC CALCULATION (BAZETT): 475 MS
EKG R AXIS: -4 DEGREES
EKG T AXIS: 31 DEGREES
EKG VENTRICULAR RATE: 67 BPM
EOSINOPHILS ABSOLUTE: 0.2 K/UL (ref 0–0.6)
EOSINOPHILS RELATIVE PERCENT: 4.7 %
GFR AFRICAN AMERICAN: >60
GFR NON-AFRICAN AMERICAN: >60
GLUCOSE BLD-MCNC: 115 MG/DL (ref 70–99)
HCT VFR BLD CALC: 36.3 % (ref 36–48)
HEMOGLOBIN: 11.6 G/DL (ref 12–16)
LV EF: 45 %
LVEF MODALITY: NORMAL
LYMPHOCYTES ABSOLUTE: 1.5 K/UL (ref 1–5.1)
LYMPHOCYTES RELATIVE PERCENT: 31.7 %
MAGNESIUM: 2 MG/DL (ref 1.8–2.4)
MCH RBC QN AUTO: 23.8 PG (ref 26–34)
MCHC RBC AUTO-ENTMCNC: 32 G/DL (ref 31–36)
MCV RBC AUTO: 74.3 FL (ref 80–100)
MONOCYTES ABSOLUTE: 0.5 K/UL (ref 0–1.3)
MONOCYTES RELATIVE PERCENT: 10.3 %
NEUTROPHILS ABSOLUTE: 2.5 K/UL (ref 1.7–7.7)
NEUTROPHILS RELATIVE PERCENT: 52.1 %
PDW BLD-RTO: 15 % (ref 12.4–15.4)
PLATELET # BLD: 330 K/UL (ref 135–450)
PMV BLD AUTO: 7.9 FL (ref 5–10.5)
POTASSIUM SERPL-SCNC: 4.1 MMOL/L (ref 3.5–5.1)
PREGNANCY, URINE: NEGATIVE
RBC # BLD: 4.88 M/UL (ref 4–5.2)
SODIUM BLD-SCNC: 138 MMOL/L (ref 136–145)
TOTAL PROTEIN: 6.9 G/DL (ref 6.4–8.2)
WBC # BLD: 4.8 K/UL (ref 4–11)

## 2022-03-22 PROCEDURE — 6360000002 HC RX W HCPCS: Performed by: NURSE ANESTHETIST, CERTIFIED REGISTERED

## 2022-03-22 PROCEDURE — 2500000003 HC RX 250 WO HCPCS

## 2022-03-22 PROCEDURE — 80053 COMPREHEN METABOLIC PANEL: CPT

## 2022-03-22 PROCEDURE — 93308 TTE F-UP OR LMTD: CPT

## 2022-03-22 PROCEDURE — 36415 COLL VENOUS BLD VENIPUNCTURE: CPT

## 2022-03-22 PROCEDURE — 2580000003 HC RX 258: Performed by: NURSE ANESTHETIST, CERTIFIED REGISTERED

## 2022-03-22 PROCEDURE — 93653 COMPRE EP EVAL TX SVT: CPT

## 2022-03-22 PROCEDURE — 93655 ICAR CATH ABLTJ DSCRT ARRHYT: CPT

## 2022-03-22 PROCEDURE — 93312 ECHO TRANSESOPHAGEAL: CPT

## 2022-03-22 PROCEDURE — 93462 L HRT CATH TRNSPTL PUNCTURE: CPT

## 2022-03-22 PROCEDURE — 86901 BLOOD TYPING SEROLOGIC RH(D): CPT

## 2022-03-22 PROCEDURE — 2580000003 HC RX 258

## 2022-03-22 PROCEDURE — 7100000000 HC PACU RECOVERY - FIRST 15 MIN

## 2022-03-22 PROCEDURE — 93662 INTRACARDIAC ECG (ICE): CPT

## 2022-03-22 PROCEDURE — 84703 CHORIONIC GONADOTROPIN ASSAY: CPT

## 2022-03-22 PROCEDURE — 93623 PRGRMD STIMJ&PACG IV RX NFS: CPT

## 2022-03-22 PROCEDURE — C1730 CATH, EP, 19 OR FEW ELECT: HCPCS

## 2022-03-22 PROCEDURE — C1894 INTRO/SHEATH, NON-LASER: HCPCS

## 2022-03-22 PROCEDURE — C1732 CATH, EP, DIAG/ABL, 3D/VECT: HCPCS

## 2022-03-22 PROCEDURE — 86850 RBC ANTIBODY SCREEN: CPT

## 2022-03-22 PROCEDURE — C1769 GUIDE WIRE: HCPCS

## 2022-03-22 PROCEDURE — 93325 DOPPLER ECHO COLOR FLOW MAPG: CPT

## 2022-03-22 PROCEDURE — 83735 ASSAY OF MAGNESIUM: CPT

## 2022-03-22 PROCEDURE — 93005 ELECTROCARDIOGRAM TRACING: CPT | Performed by: INTERNAL MEDICINE

## 2022-03-22 PROCEDURE — 33286 RMVL SUBQ CAR RHYTHM MNTR: CPT | Performed by: INTERNAL MEDICINE

## 2022-03-22 PROCEDURE — 3700000000 HC ANESTHESIA ATTENDED CARE

## 2022-03-22 PROCEDURE — 93623 PRGRMD STIMJ&PACG IV RX NFS: CPT | Performed by: INTERNAL MEDICINE

## 2022-03-22 PROCEDURE — 86900 BLOOD TYPING SEROLOGIC ABO: CPT

## 2022-03-22 PROCEDURE — 93010 ELECTROCARDIOGRAM REPORT: CPT | Performed by: INTERNAL MEDICINE

## 2022-03-22 PROCEDURE — C1760 CLOSURE DEV, VASC: HCPCS

## 2022-03-22 PROCEDURE — 3700000001 HC ADD 15 MINUTES (ANESTHESIA)

## 2022-03-22 PROCEDURE — C1759 CATH, INTRA ECHOCARDIOGRAPHY: HCPCS

## 2022-03-22 PROCEDURE — 93655 ICAR CATH ABLTJ DSCRT ARRHYT: CPT | Performed by: INTERNAL MEDICINE

## 2022-03-22 PROCEDURE — 85025 COMPLETE CBC W/AUTO DIFF WBC: CPT

## 2022-03-22 PROCEDURE — 93462 L HRT CATH TRNSPTL PUNCTURE: CPT | Performed by: INTERNAL MEDICINE

## 2022-03-22 PROCEDURE — 93653 COMPRE EP EVAL TX SVT: CPT | Performed by: INTERNAL MEDICINE

## 2022-03-22 PROCEDURE — 7100000001 HC PACU RECOVERY - ADDTL 15 MIN

## 2022-03-22 PROCEDURE — 33286 RMVL SUBQ CAR RHYTHM MNTR: CPT

## 2022-03-22 PROCEDURE — 93662 INTRACARDIAC ECG (ICE): CPT | Performed by: INTERNAL MEDICINE

## 2022-03-22 PROCEDURE — 2500000003 HC RX 250 WO HCPCS: Performed by: NURSE ANESTHETIST, CERTIFIED REGISTERED

## 2022-03-22 PROCEDURE — 93320 DOPPLER ECHO COMPLETE: CPT

## 2022-03-22 PROCEDURE — 6360000002 HC RX W HCPCS

## 2022-03-22 RX ORDER — EPHEDRINE SULFATE/0.9% NACL/PF 50 MG/5 ML
SYRINGE (ML) INTRAVENOUS PRN
Status: DISCONTINUED | OUTPATIENT
Start: 2022-03-22 | End: 2022-03-22 | Stop reason: SDUPTHER

## 2022-03-22 RX ORDER — PROPOFOL 10 MG/ML
INJECTION, EMULSION INTRAVENOUS PRN
Status: DISCONTINUED | OUTPATIENT
Start: 2022-03-22 | End: 2022-03-22 | Stop reason: SDUPTHER

## 2022-03-22 RX ORDER — SODIUM CHLORIDE 0.9 % (FLUSH) 0.9 %
5-40 SYRINGE (ML) INJECTION PRN
Status: DISCONTINUED | OUTPATIENT
Start: 2022-03-22 | End: 2022-03-22 | Stop reason: HOSPADM

## 2022-03-22 RX ORDER — MIDAZOLAM HYDROCHLORIDE 1 MG/ML
INJECTION INTRAMUSCULAR; INTRAVENOUS PRN
Status: DISCONTINUED | OUTPATIENT
Start: 2022-03-22 | End: 2022-03-22 | Stop reason: SDUPTHER

## 2022-03-22 RX ORDER — DEXAMETHASONE SODIUM PHOSPHATE 4 MG/ML
INJECTION, SOLUTION INTRA-ARTICULAR; INTRALESIONAL; INTRAMUSCULAR; INTRAVENOUS; SOFT TISSUE PRN
Status: DISCONTINUED | OUTPATIENT
Start: 2022-03-22 | End: 2022-03-22 | Stop reason: SDUPTHER

## 2022-03-22 RX ORDER — HEPARIN SODIUM 1000 [USP'U]/ML
INJECTION, SOLUTION INTRAVENOUS; SUBCUTANEOUS PRN
Status: DISCONTINUED | OUTPATIENT
Start: 2022-03-22 | End: 2022-03-22 | Stop reason: SDUPTHER

## 2022-03-22 RX ORDER — SUCCINYLCHOLINE/SOD CL,ISO/PF 200MG/10ML
SYRINGE (ML) INTRAVENOUS PRN
Status: DISCONTINUED | OUTPATIENT
Start: 2022-03-22 | End: 2022-03-22 | Stop reason: SDUPTHER

## 2022-03-22 RX ORDER — OXYCODONE HYDROCHLORIDE 5 MG/1
5 TABLET ORAL
Status: DISCONTINUED | OUTPATIENT
Start: 2022-03-22 | End: 2022-03-22 | Stop reason: HOSPADM

## 2022-03-22 RX ORDER — MEPERIDINE HYDROCHLORIDE 25 MG/ML
12.5 INJECTION INTRAMUSCULAR; INTRAVENOUS; SUBCUTANEOUS EVERY 5 MIN PRN
Status: DISCONTINUED | OUTPATIENT
Start: 2022-03-22 | End: 2022-03-22 | Stop reason: HOSPADM

## 2022-03-22 RX ORDER — SODIUM CHLORIDE 0.9 % (FLUSH) 0.9 %
5-40 SYRINGE (ML) INJECTION EVERY 12 HOURS SCHEDULED
Status: CANCELLED | OUTPATIENT
Start: 2022-03-22

## 2022-03-22 RX ORDER — SODIUM CHLORIDE 0.9 % (FLUSH) 0.9 %
5-40 SYRINGE (ML) INJECTION PRN
Status: CANCELLED | OUTPATIENT
Start: 2022-03-22

## 2022-03-22 RX ORDER — LABETALOL HYDROCHLORIDE 5 MG/ML
10 INJECTION, SOLUTION INTRAVENOUS
Status: DISCONTINUED | OUTPATIENT
Start: 2022-03-22 | End: 2022-03-22 | Stop reason: HOSPADM

## 2022-03-22 RX ORDER — HYDROMORPHONE HCL 110MG/55ML
0.5 PATIENT CONTROLLED ANALGESIA SYRINGE INTRAVENOUS EVERY 5 MIN PRN
Status: DISCONTINUED | OUTPATIENT
Start: 2022-03-22 | End: 2022-03-22 | Stop reason: HOSPADM

## 2022-03-22 RX ORDER — ACETAMINOPHEN 325 MG/1
650 TABLET ORAL EVERY 4 HOURS PRN
Status: CANCELLED | OUTPATIENT
Start: 2022-03-22

## 2022-03-22 RX ORDER — HYDRALAZINE HYDROCHLORIDE 20 MG/ML
10 INJECTION INTRAMUSCULAR; INTRAVENOUS
Status: DISCONTINUED | OUTPATIENT
Start: 2022-03-22 | End: 2022-03-22 | Stop reason: HOSPADM

## 2022-03-22 RX ORDER — SODIUM CHLORIDE 9 MG/ML
25 INJECTION, SOLUTION INTRAVENOUS PRN
Status: CANCELLED | OUTPATIENT
Start: 2022-03-22

## 2022-03-22 RX ORDER — ONDANSETRON 2 MG/ML
4 INJECTION INTRAMUSCULAR; INTRAVENOUS
Status: DISCONTINUED | OUTPATIENT
Start: 2022-03-22 | End: 2022-03-22 | Stop reason: HOSPADM

## 2022-03-22 RX ORDER — SODIUM CHLORIDE 9 MG/ML
INJECTION, SOLUTION INTRAVENOUS CONTINUOUS PRN
Status: DISCONTINUED | OUTPATIENT
Start: 2022-03-22 | End: 2022-03-22 | Stop reason: SDUPTHER

## 2022-03-22 RX ORDER — ROCURONIUM BROMIDE 10 MG/ML
INJECTION, SOLUTION INTRAVENOUS PRN
Status: DISCONTINUED | OUTPATIENT
Start: 2022-03-22 | End: 2022-03-22 | Stop reason: SDUPTHER

## 2022-03-22 RX ORDER — LIDOCAINE HYDROCHLORIDE 20 MG/ML
INJECTION, SOLUTION EPIDURAL; INFILTRATION; INTRACAUDAL; PERINEURAL PRN
Status: DISCONTINUED | OUTPATIENT
Start: 2022-03-22 | End: 2022-03-22 | Stop reason: SDUPTHER

## 2022-03-22 RX ORDER — ONDANSETRON 2 MG/ML
INJECTION INTRAMUSCULAR; INTRAVENOUS PRN
Status: DISCONTINUED | OUTPATIENT
Start: 2022-03-22 | End: 2022-03-22 | Stop reason: SDUPTHER

## 2022-03-22 RX ORDER — FENTANYL CITRATE 50 UG/ML
INJECTION, SOLUTION INTRAMUSCULAR; INTRAVENOUS PRN
Status: DISCONTINUED | OUTPATIENT
Start: 2022-03-22 | End: 2022-03-22 | Stop reason: SDUPTHER

## 2022-03-22 RX ADMIN — HEPARIN SODIUM 14000 UNITS: 1000 INJECTION, SOLUTION INTRAVENOUS; SUBCUTANEOUS at 08:34

## 2022-03-22 RX ADMIN — Medication 200 MG: at 07:53

## 2022-03-22 RX ADMIN — Medication 10 MG: at 08:04

## 2022-03-22 RX ADMIN — ROCURONIUM BROMIDE 10 MG: 10 INJECTION, SOLUTION INTRAVENOUS at 07:53

## 2022-03-22 RX ADMIN — SUGAMMADEX 200 MG: 100 INJECTION, SOLUTION INTRAVENOUS at 10:54

## 2022-03-22 RX ADMIN — SODIUM CHLORIDE: 9 INJECTION, SOLUTION INTRAVENOUS at 08:35

## 2022-03-22 RX ADMIN — ISOPROTERENOL HYDROCHLORIDE 4 MCG/MIN: 0.2 INJECTION, SOLUTION INTRAMUSCULAR; INTRAVENOUS at 08:45

## 2022-03-22 RX ADMIN — PROPOFOL 30 MG: 10 INJECTION, EMULSION INTRAVENOUS at 10:50

## 2022-03-22 RX ADMIN — ONDANSETRON 4 MG: 2 INJECTION INTRAMUSCULAR; INTRAVENOUS at 10:24

## 2022-03-22 RX ADMIN — LIDOCAINE HYDROCHLORIDE 100 MG: 20 INJECTION, SOLUTION EPIDURAL; INFILTRATION; INTRACAUDAL; PERINEURAL at 07:53

## 2022-03-22 RX ADMIN — ROCURONIUM BROMIDE 20 MG: 10 INJECTION, SOLUTION INTRAVENOUS at 08:03

## 2022-03-22 RX ADMIN — Medication 10 MG: at 08:18

## 2022-03-22 RX ADMIN — HEPARIN SODIUM 2000 UNITS: 1000 INJECTION, SOLUTION INTRAVENOUS; SUBCUTANEOUS at 09:02

## 2022-03-22 RX ADMIN — FENTANYL CITRATE 50 MCG: 50 INJECTION, SOLUTION INTRAMUSCULAR; INTRAVENOUS at 08:11

## 2022-03-22 RX ADMIN — PHENYLEPHRINE HYDROCHLORIDE 30 MCG/MIN: 10 INJECTION INTRAVENOUS at 07:59

## 2022-03-22 RX ADMIN — FENTANYL CITRATE 50 MCG: 50 INJECTION, SOLUTION INTRAMUSCULAR; INTRAVENOUS at 07:53

## 2022-03-22 RX ADMIN — HEPARIN SODIUM 5000 UNITS: 1000 INJECTION, SOLUTION INTRAVENOUS; SUBCUTANEOUS at 09:29

## 2022-03-22 RX ADMIN — FENTANYL CITRATE 50 MCG: 50 INJECTION, SOLUTION INTRAMUSCULAR; INTRAVENOUS at 11:11

## 2022-03-22 RX ADMIN — Medication 5 MG: at 09:12

## 2022-03-22 RX ADMIN — Medication 5 MG: at 10:54

## 2022-03-22 RX ADMIN — SODIUM CHLORIDE: 9 INJECTION, SOLUTION INTRAVENOUS at 07:41

## 2022-03-22 RX ADMIN — DEXAMETHASONE SODIUM PHOSPHATE 8 MG: 4 INJECTION, SOLUTION INTRAMUSCULAR; INTRAVENOUS at 10:24

## 2022-03-22 RX ADMIN — MIDAZOLAM 2 MG: 1 INJECTION INTRAMUSCULAR; INTRAVENOUS at 07:42

## 2022-03-22 RX ADMIN — PROPOFOL 120 MG: 10 INJECTION, EMULSION INTRAVENOUS at 07:53

## 2022-03-22 ASSESSMENT — PULMONARY FUNCTION TESTS
PIF_VALUE: 22
PIF_VALUE: 24
PIF_VALUE: 24
PIF_VALUE: 1
PIF_VALUE: 22
PIF_VALUE: 6
PIF_VALUE: 22
PIF_VALUE: 23
PIF_VALUE: 23
PIF_VALUE: 0
PIF_VALUE: 15
PIF_VALUE: 23
PIF_VALUE: 22
PIF_VALUE: 24
PIF_VALUE: 23
PIF_VALUE: 24
PIF_VALUE: 22
PIF_VALUE: 24
PIF_VALUE: 22
PIF_VALUE: 25
PIF_VALUE: 22
PIF_VALUE: 23
PIF_VALUE: 22
PIF_VALUE: 1
PIF_VALUE: 23
PIF_VALUE: 22
PIF_VALUE: 23
PIF_VALUE: 22
PIF_VALUE: 15
PIF_VALUE: 23
PIF_VALUE: 22
PIF_VALUE: 1
PIF_VALUE: 1
PIF_VALUE: 23
PIF_VALUE: 26
PIF_VALUE: 1
PIF_VALUE: 23
PIF_VALUE: 0
PIF_VALUE: 22
PIF_VALUE: 22
PIF_VALUE: 20
PIF_VALUE: 24
PIF_VALUE: 1
PIF_VALUE: 24
PIF_VALUE: 22
PIF_VALUE: 22
PIF_VALUE: 20
PIF_VALUE: 23
PIF_VALUE: 22
PIF_VALUE: 1
PIF_VALUE: 23
PIF_VALUE: 22
PIF_VALUE: 22
PIF_VALUE: 1
PIF_VALUE: 24
PIF_VALUE: 22
PIF_VALUE: 0
PIF_VALUE: 22
PIF_VALUE: 23
PIF_VALUE: 24
PIF_VALUE: 23
PIF_VALUE: 22
PIF_VALUE: 23
PIF_VALUE: 22
PIF_VALUE: 24
PIF_VALUE: 24
PIF_VALUE: 22
PIF_VALUE: 24
PIF_VALUE: 23
PIF_VALUE: 24
PIF_VALUE: 22
PIF_VALUE: 22
PIF_VALUE: 23
PIF_VALUE: 22
PIF_VALUE: 23
PIF_VALUE: 22
PIF_VALUE: 22
PIF_VALUE: 23
PIF_VALUE: 23
PIF_VALUE: 22
PIF_VALUE: 23
PIF_VALUE: 22
PIF_VALUE: 0
PIF_VALUE: 23
PIF_VALUE: 22
PIF_VALUE: 23
PIF_VALUE: 23
PIF_VALUE: 0
PIF_VALUE: 23
PIF_VALUE: 25
PIF_VALUE: 1
PIF_VALUE: 22
PIF_VALUE: 23
PIF_VALUE: 22
PIF_VALUE: 0
PIF_VALUE: 22
PIF_VALUE: 15
PIF_VALUE: 22
PIF_VALUE: 3
PIF_VALUE: 24
PIF_VALUE: 23
PIF_VALUE: 23
PIF_VALUE: 22
PIF_VALUE: 23
PIF_VALUE: 22
PIF_VALUE: 23
PIF_VALUE: 22
PIF_VALUE: 20
PIF_VALUE: 23
PIF_VALUE: 24
PIF_VALUE: 1
PIF_VALUE: 23
PIF_VALUE: 1
PIF_VALUE: 20
PIF_VALUE: 22
PIF_VALUE: 15
PIF_VALUE: 1
PIF_VALUE: 22
PIF_VALUE: 15
PIF_VALUE: 22
PIF_VALUE: 23
PIF_VALUE: 24
PIF_VALUE: 23
PIF_VALUE: 0
PIF_VALUE: 23
PIF_VALUE: 22
PIF_VALUE: 23
PIF_VALUE: 22
PIF_VALUE: 24
PIF_VALUE: 23
PIF_VALUE: 22
PIF_VALUE: 13
PIF_VALUE: 23
PIF_VALUE: 20
PIF_VALUE: 22
PIF_VALUE: 23
PIF_VALUE: 22
PIF_VALUE: 23
PIF_VALUE: 5
PIF_VALUE: 24
PIF_VALUE: 22
PIF_VALUE: 1
PIF_VALUE: 23
PIF_VALUE: 23
PIF_VALUE: 1
PIF_VALUE: 24
PIF_VALUE: 25
PIF_VALUE: 23
PIF_VALUE: 22
PIF_VALUE: 22
PIF_VALUE: 23
PIF_VALUE: 22
PIF_VALUE: 22
PIF_VALUE: 23
PIF_VALUE: 22
PIF_VALUE: 23
PIF_VALUE: 22
PIF_VALUE: 24
PIF_VALUE: 22
PIF_VALUE: 22
PIF_VALUE: 23
PIF_VALUE: 23
PIF_VALUE: 24

## 2022-03-22 ASSESSMENT — PAIN DESCRIPTION - PAIN TYPE
TYPE: ACUTE PAIN

## 2022-03-22 ASSESSMENT — PAIN SCALES - GENERAL
PAINLEVEL_OUTOF10: 6
PAINLEVEL_OUTOF10: 10
PAINLEVEL_OUTOF10: 5
PAINLEVEL_OUTOF10: 5
PAINLEVEL_OUTOF10: 6
PAINLEVEL_OUTOF10: 10

## 2022-03-22 ASSESSMENT — LIFESTYLE VARIABLES: SMOKING_STATUS: 0

## 2022-03-22 ASSESSMENT — PAIN DESCRIPTION - DESCRIPTORS
DESCRIPTORS: CONSTANT

## 2022-03-22 ASSESSMENT — PAIN DESCRIPTION - LOCATION
LOCATION: BACK

## 2022-03-22 ASSESSMENT — ENCOUNTER SYMPTOMS: SHORTNESS OF BREATH: 1

## 2022-03-22 NOTE — PROGRESS NOTES
Dr. Pro Mack has reviewed [facility] SYSTEM to transfer patient back to cath lab for discharge.      Right groin site soft to touch, c/d/i

## 2022-03-22 NOTE — ANESTHESIA POSTPROCEDURE EVALUATION
Department of Anesthesiology  Postprocedure Note    Patient: Rodolfo Rendon  MRN: [id number]  YOB: 1985  Date of evaluation: 3/22/2022  Time:  11:32 AM     Procedure Summary     Date: 03/22/22 Room / Location: [facility] Cath Lab; [facility] Echocardiography    Anesthesia Start: 5595 Anesthesia Stop: 1128    Procedure: ECHO/ABLATION WITH ANESTHESIA Diagnosis:       Supraventricular tachycardia      Supraventricular tachycardia    Scheduled Providers:  Responsible Provider: Atul Mcclendon MD    Anesthesia Type: general ASA Status: 3          Anesthesia Type: general    Rubina Phase I: Rubina Score: 5    Rubina Phase II:      Last vitals: Reviewed and per EMR flowsheets.        Anesthesia Post Evaluation    Patient location during evaluation: PACU  Patient participation: complete - patient participated  Level of consciousness: awake and alert  Airway patency: patent  Nausea & Vomiting: no vomiting and no nausea  Complications: no  Cardiovascular status: hemodynamically stable  Respiratory status: acceptable  Hydration status: stable

## 2022-03-22 NOTE — PROGRESS NOTES
Pt alert, Dr. Villanueva Annette at bedside states he would like a stat bedside ECHO. Order to be placed.

## 2022-03-22 NOTE — PROCEDURES
use of fluoroscopy. Both groins were prepped in a sterile fashion. We gained access in Right femoral vein. A 9-[place]izen [place] sheath for ICE and 6F sheath for CS catheter and an Sr0 sheath for ablation and mapping catheters were  placed in the right femoral vein using modified seldinger technique. Ultrasound was used for femoral venous access. We gained access   modified Seldinger technique and ultrasound guidance. The femoral vein was identified with real time visualization of needle passage to the venous lumen. Using 3-D mapping system Carto the CS cathter was placed inside the coronary sinus  for recording and mapping of the left atrium. Using ICE we delineated the left pulmonary vein, left atrial appendage,  mitral valve, and right superior and right inferior pulmonary veins. Patient received a bolus of heparin prior transseptal puncture followed by continuous monitoring of the ACT and boluses of heparin during the procedure to keep the ACT more than 350. Also an esophageal temperature probe in addition to Esophastar catheter was advanced into the esophagus for mapping of the esophagus and careful monitoring of the esophageal temperature during ablation. The ICE was used to monitor location of temperature probe and move it close to ablation area. Since patient has had frequent atrial flutter and tachycardia is in addition to some episodes of A. fib, we decided to first try to induce the atrial arrhythmias and mapped them. Patient was  started on isoproterenol at 4 mcg. Programmed stimulation was done and being used in atrial flutter with a cycle length [id number]. Using Penta ray V first mapped the right atrium which  confirmed the tachycardia to be counterclockwise typical atrial flutter. Typical atrial flutter,   Radiofrequency lesions were delivered in a linear fashion to Cavotricuspid isthmus until it was terminated.  While maintaining pacing from the proximal atrium and mapped. This new map showed the tachycardia to be again suggestive of focal atrial tachycardia with activation near the coronary sinus. There are this activation based on the Penta array map was around the coronary sinus ostium. Few lesions in this area did not terminated. Then using the ablation catheter we mapped the area slightly outside of the coronary sinus and just in front of it and  inferior septal and near the tricuspid annulus there was an area of very fractionated signal with early activation and region is here terminated tachycardia. After that we were not able to induce any sustained tachycardias. Programmed stimulation and burst pacing was done. Only a few beats of tachycardias with different activation sequences and cycle length could be seen which all were nonsustained. Bidirectional block was again checked and confirmed. Therefore at this point I decided not to proceed with ablation of atrial fibrillation based on the fact that we could not induce any atrial fibrillation during the procedure, there were no apparent focal activity from the pulmonary veins, left atrium was very small and normal in voltage and finally all the tachycardia is every induced remapped and limited to the right atrium. Burst pacing and programmed stimulation with up to three premature atrial stimuli did not induce any sustained arrhythmia. After ablation we removed the catheters and sheaths from left atrium and performed EP study and programmed stimulation using our CS catheter and ablation cathter to assess for other arrhythmias. The deca polar/ablation catheter were moved from CS to right atrium, RV apex, and His bundle position.  His bundle potentials was recorded and pacing was performed from right atrium, coronary sinus LV and RV apex with the following results:          AH interval was 180 msec  HV interval was 42 msec  Pacing from atrium, using CS catheter which was moved, showed 1:1 conduction over AV node with (AV wenckebach) was 520 msec  Pacing from atrium, AV bernice ERP was 600/480 msec   Pacing from RV apex, using CS cathter which was moved to the RV apex showed no 1:1 conduction over AV node (VA wenckebach) at 800 mec  Pacing from RV apex, showed VERP of 600/300 msec. At the end of the procedure, using ICE we confirmed the lack of any pericardial effusion. Since patient was on anticoagulation with heparin with high ACT, we used Perclose    device for closure of access sites on the right and left femoral vein. The patient tolerated the procedure well and there were no complications. Patient was extubated and transferred to the floor in stable condition. Blood loss <87 cc  No complication  Conclusion:     -Ablation of typical counterclockwise atrial flutter  ·  - Additional ablation of focal atrial tachycardia from inferoseptal and lower tricuspid valve as a separate mechanism      Plan:   The patient will be observed for a few hours and discharged home if   stable . Patient will receive usual post ablation care.      Lata Herring MD,   [place] [address]   Office: [phone]

## 2022-03-22 NOTE — ANESTHESIA PRE PROCEDURE
Department of Anesthesiology  Preprocedure Note       Name:  Claudette Mail   Age:  39 y.o.  :  1985                                          MRN:  [id number]         Date:  3/22/2022      Surgeon: * Surgery not found *    Procedure:     Medications prior to admission:   Prior to Admission medications    Medication Sig Start Date End Date Taking? Authorizing Provider   sotalol (BETAPACE) 160 MG tablet Take 1 tablet by mouth 2 times daily 3/10/22   Nickie Whitaker MD   albuterol sulfate HFA (PROVENTIL HFA) 108 (90 Base) MCG/ACT inhaler Inhale 2 puffs into the lungs every 6 hours as needed for Wheezing 21   Nathan Rosas MD   metoprolol succinate (TOPROL XL) 100 MG extended release tablet Take 1 tablet by mouth daily 21   Nickie Whitaker MD   aspirin 81 MG tablet Take 81 mg by mouth daily    Historical Provider, MD       Current medications:    Current Outpatient Medications   Medication Sig Dispense Refill    sotalol (BETAPACE) 160 MG tablet Take 1 tablet by mouth 2 times daily 60 tablet 3    albuterol sulfate HFA (PROVENTIL HFA) 108 (90 Base) MCG/ACT inhaler Inhale 2 puffs into the lungs every 6 hours as needed for Wheezing 18 g 3    metoprolol succinate (TOPROL XL) 100 MG extended release tablet Take 1 tablet by mouth daily 90 tablet 3    aspirin 81 MG tablet Take 81 mg by mouth daily       Current Facility-Administered Medications   Medication Dose Route Frequency Provider Last Rate Last Admin    sodium chloride flush 0.9 % injection 5-40 mL  5-40 mL IntraVENous PRN Nickie Whitaker MD           Allergies: Allergies   Allergen Reactions    Caffeine Hives, Palpitations and Other (See Comments)     Other reaction(s): Other (See Comments)  migraine  headaches      Red Dye Hives, Palpitations and Headaches     Other reaction(s):  Other (See Comments)  headaches      Other      Migraines       Problem List:    Patient Active Problem List   Diagnosis Code    PAF (paroxysmal atrial fibrillation) ([facility]) I48.0    CHB (complete heart block) ([facility]) I44.2    Pacemaker Z95.0    Chest pain R07.9    Palpitation R00.2    Dyspnea and respiratory abnormalities R06.00, R06.89    Chronic diastolic heart failure ([facility]) I50.32    NSVT (nonsustained ventricular tachycardia) ([facility]) I47.2       Past Medical History:        Diagnosis Date    Asthma     CHF (congestive heart failure) ([facility])     Congenital heart defect     Migraines, basilar     Palpitations     Sciatica     lower right    VSD (ventricular septal defect and aortic arch hypoplasia        Past Surgical History:        Procedure Laterality Date    CARDIAC SURGERY      ventricular septal defect repair at 4 months    PACEMAKER PLACEMENT  07/31/2019    TONSILLECTOMY      and adnoids removed    VSD REPAIR      baby       Social History:    Social History     Tobacco Use    Smoking status: Never Smoker    Smokeless tobacco: Never Used   Substance Use Topics    Alcohol use: Yes     Comment: occ                                Counseling given: Not Answered      Vital Signs (Current): There were no vitals filed for this visit.                                            BP Readings from Last 3 Encounters:   12/13/21 102/72   12/12/21 124/85   10/07/21 124/85       NPO Status:                                                                                 BMI:   Wt Readings from Last 3 Encounters:   12/13/21 (!) 330 lb 9.6 oz (150 kg)   12/12/21 (!) 336 lb 3.2 oz (152.5 kg)   10/07/21 (!) 321 lb (145.6 kg)     There is no height or weight on file to calculate BMI.    CBC:   Lab Results   Component Value Date    WBC 6.8 12/13/2021    RBC 4.89 12/13/2021    HGB 11.7 12/13/2021    HCT 35.9 12/13/2021    MCV 73.3 12/13/2021    RDW 14.8 12/13/2021     12/13/2021       CMP:   Lab Results   Component Value Date     12/13/2021    K 4.8 12/13/2021     12/13/2021    CO2 26 12/13/2021    BUN 14 12/13/2021    CREATININE 0.6 12/13/2021 GFRAA >60 12/13/2021    AGRATIO 1.1 07/28/2021    LABGLOM >60 12/13/2021    GLUCOSE 85 12/13/2021    PROT 7.2 07/28/2021    CALCIUM 9.1 12/13/2021    BILITOT 0.4 07/28/2021    ALKPHOS 49 07/28/2021    AST 15 07/28/2021    ALT 14 07/28/2021       POC Tests: No results for input(s): POCGLU, POCNA, POCK, POCCL, POCBUN, POCHEMO, POCHCT in the last 72 hours.     Coags:   Lab Results   Component Value Date    PROTIME 12.8 02/26/2021    INR 1.10 02/26/2021    APTT 28.2 02/26/2021       HCG (If Applicable): No results found for: PREGTESTUR, PREGSERUM, HCG, HCGQUANT     ABGs: No results found for: PHART, PO2ART, XEY9PUN, WFH6BUE, BEART, C0CLTSCP     Type & Screen (If Applicable):  No results found for: LABABO, LABRH    Drug/Infectious Status (If Applicable):  No results found for: HIV, HEPCAB    COVID-19 Screening (If Applicable):   Lab Results   Component Value Date    COVID19 Not Detected 03/18/2022    COVID19 Not Detected 02/26/2021           Anesthesia Evaluation  Patient summary reviewed and Nursing notes reviewed no history of anesthetic complications:   Airway: Mallampati: II  TM distance: >3 FB   Neck ROM: full  Mouth opening: > = 3 FB Dental: normal exam         Pulmonary:normal exam  breath sounds clear to auscultation  (+) shortness of breath (occ bouts, thought poss 2/2 ectopy):  asthma (prn inh use, no recent exacerbations):     (-) sleep apnea and not a current smoker                           Cardiovascular:    (+) pacemaker (PPM 2019, DDD , MDT): pacemaker, dysrhythmias (PAF, NSVT/NSAT, CHB s/p PPM, on bb): atrial flutter, atrial fibrillation, SVT and ventricular tachycardia, CHF (chronic dCHF,echo 2022 nml RV and LV syst fcn, Mild LVOT narrowing 2/2 IV tunnel repair w/ possible subaortic membrane, mod TR; no s/s of vol overload, compensated): diastolic, murmur,     (-) past MI, CAD, CABG/stent and  angina    ECG reviewed  Rhythm: regular  Rate: normal  Echocardiogram reviewed         Beta Blocker:  Dose within 24 Hrs      ROS comment: 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 MIPS: Postoperative opioids intended and Prophylactic antiemetics administered. Anesthetic plan and risks discussed with patient. Plan discussed with CRNA.                   Pramod Darby MD   3/22/2022

## 2022-03-22 NOTE — H&P
[address]   Electrophysiology    CC: Palpitations   HPI: Norma Amaral is a 39 y.o. female with pmh repaired DORV with LV dysfunction. She underwent interventricular tunnel VSD closure in 1985. 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 She developed atrial tachycardia in 2010 and she underwent successful cardioversion. She is referred today for further evaluation of palpitations. She reports she has intermittent palpitations that make her acutely SOB and dizzy, no arrhythmias have been captured by EKG. She reports the episodes last 5-7 minutes in duration.     8/2/19: Insertion of dual chamber pacemaker for CHB     Friday 9/27/19 she had chest pain and went to the ED and they discharged her. Saturday she had episodes of shortness of breath and her heart racing and she felt like she could barely walk. She laid down and rested and the episode eased up. She said when she was driving here she felt light headed. Her OB/GYN told her she could not take Lasix because of the amniotic fluid could be affected.     On 10/2/19 she had a hospital admission for asthma related symptoms with treatment including albuterol, prednisone and corticosteroid inhaler. She had episodes of atrial fib/flutter with aRVR.     12/23/2020 Presented to the ED with dental pain, chest pain and Shortness of breath    12/14/2021 she presented to [facility] ED with complaints of palpitations. Discharged on diltiazem but patient did not start rhe medication at that time. 3/7/2022 she did follow up with [facility] cardiology had an echo the same day as her appointment       She has complaints of palpitations and chest pain. Her episodes of symptomatic atrial fibrillation are becoming more frequent and increasing in symptoms.      Assessment and plan:   Atrial fibrillation/flutter with rapid ventricular response  Atrial tachycardia   -Feels as though her symptoms are increasing in intensity and duration   -Episodes of Atrial flutter/AT on her device interrogation    -Her options at this time are increasing her sotalol to 160 mg BID (this is max dosage) or proceed with an EPS and ablation   -She would like to proceed with increasing her sotalol to 160 mg BID and scheduling for an EPS and ablation    -Increase sotalol to 160 mg BID, start Scottie 3/13/2022. Will have ECG done Monday 3/14/2022 and Tuesday 3/15/2022 at [facility] and if she has changes to QTc we will have to decrease dose back to 120 mg BID   -Symptomatic with these episodes, resolve with rest.   -Toprol XL 75 mg, sotalol 120 mg BID    -No anticoagulation for now      [facility] has been having more episodes of atrial tachycardia, atrial flutter that later seem to be going to hours what appears to be regular atrial fibrillation. His episodes are very symptomatic and lasts longer up to 6 hours. She is on 120 mg of sotalol twice a day. We discussed the options including increasing the dose of sotalol to 160 mg twice daily. He can try amiodarone. We can try ablation. At this point she prefers to proceed with ablation but meanwhile we will increase the dose of sotalol to see if we can control these episodes better. If sotalol alone is not able to control it, we can consider adding diltiazem to help control the heart rate when she has the episodes. As far as the ablation strategy goes, I will proceed with trying to induce atrial tachycardia or flutter. If he does use such rhythm consistently, we will proceed with ablating it. However we may decide to proceed with ablation of atrial fibrillation if the findings indicate that the source of rhythms are from left atrium and pulmonary veins or if he induce atrial fibrillation during EP study.     NSVT    -Continue BB   -Increase sotalol    -Symptomatic with even short episodes     She had a few more episodes during late July and late August.  However she has not consistently had any since then. We will continue to monitor them. The longest nonsustained VT was 6 seconds. Her most recent echo showed normal LV function without any significant change except for increasing TR severity to moderate. We will continue to monitor. I also asked her to let us know if her symptoms change. We will continue to monitor remotely. For now we will continue with treating medically. High grade AV block   -S/p Dual chamber PPM 7/31/19   -The CIED was interrogated and programmed and I supervised and reviewed all the data. All findings and changes are in device interrogation sheet and reflect my personal interpretation and changes and is scanned to Epic.    - 12.5years remaining, AP 7.2% ,  <0.1%      Syncope/Pause   -No further episodes. Did have presyncope   -Encouraged supportive measures including maintaining hydration   - s/p Permanent pacemaker     Congenital Heart disease   -Stable. Followed at [facility]    -s/p interventricular tunnel VSD closure, 10/17/85    -s/p resection of RV muscle bundles, 8/10/00. dyspnea on exertion   -Normal ventricular function    Obesity    -Excessive weight is complicating assessment and treatment.  It is placing patient at risk for multiple co-morbidities as well as early death and contributing to the patient's presentation.  -Discussed weight management with diet and exercise            Plan:      Schedule EPS and ablation,   remove her ILR       Patient Active Problem List    Diagnosis Date Noted    NSVT (nonsustained ventricular tachycardia) ([address]     Chest pain 08/28/2019    Palpitation     Dyspnea and respiratory abnormalities     Chronic diastolic heart failure ([address]     Pacemaker     CHB (complete heart block) ([address] 07/31/2019    PAF (paroxysmal atrial fibrillation) ([facility])      Diagnostic studies:   ECG:3/10/22  None VV    Echo 3/7/2022   1. 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    2. Moderate tricuspid valve regurgitation.    3. Trivial aortic valve regurgitation.    4. Mild LVOT narrowing secondary to interventricular tunnel repair with a possible subaortic membrane (limited visualization due to poor acoustic windows), mean gradient 11 mmHg.    5. No evidence of right ventricular hypertension.    6. Right ventricle is mildly dilated and the systolic function is normal.    7. Left ventricle is normal in size and the systolic function is normal.    8. There is no significant pericardial effusion.    9. Compared to the previous echocardiogram of 3/18/2021, there is no significant change.         ECHO 03/11/2020 [facility]   1. Double outlet right ventricle with normally related great vessels and a doubly committed VSD      -s/p interventricular tunnel VSD closure, 10/17/85      -s/p resection of RV muscle bundles, 8/10/00.   2. Mild LVOT narrowing secondary to interventricular tunnel repair, mean gradient 14 mmHg, previously 22 mmHg. 3. Trivial aortic valve regurgitation. 4. Moderate tricuspid valve regurgitation. 5. No right ventricular hypertension. 6. Right ventricle is mildly dilated and the systolic function is normal.   7. Left ventricle is normal in size and the systolic function is normal.   8. There is no significant pericardial effusion. 9. Compared to the previous echocardiogram of 11/11/2019, the patient is post-partum and the LVOT gradient has decreased. There is a possible subaortic membrane in the LVOT.     Echo:  8/28/19  Summary   -Normal left ventricle size, wall thickness, and systolic function with an   estimated ejection fraction of 55%.  -Abnormal septal motion.   -Trivial mitral regurgitation.   -Patient has a history of a ventricular septal defect and VSD repair.   -Patient has a history of interventricular tunnel repair.   -Patient has a history of interventricular tunnel repair.  The aortic valve   is poorly visualized. There is a fixed gradient through the LV outflow area.   The peak gradient is estimated at 47 mmHg with a mean pressure gradient of   27 mmHg. It is unclear if the obstruction is due to the VSD repair or if  Genella Pool is some aortic stenosis. The obstruction is moderate.   -Normal diastolic function. Avg. E/e=8.65   -Bubble study performed with no obvious right to left shunt.   -Pacemaker wire noted in the right heart.     Echo 7/18/19  Summary:   1. Double outlet right ventricle with normally related great vessels and a doubly committed VSD      -s/p interventricular tunnel VSD closure, 10/17/85      -s/p resection of RV muscle bundles, 8/10/00.   2. Mild LVOT narrowing secondary to interventricular tunnel repair, peak gradient 28 mmHg, mean gradient 14 mmHg. 3. No aortic valve regurgitation. 4. Left ventricle is normal in size and the systolic function is normal.   5. Mild tricuspid valve regurgitation. 6. Right ventricle is mildly dilated and the systolic function is normal.   7. The ascending aorta, transverse arch and descending aorta are unobstructed. 8. There is no significant pericardial effusion. 9. Compared to the previous echocardiogram of 3/28/2018, there is no longer RV hypertension. I independently reviewed the cardiac diagnostic studies, ECG and relevant imaging studies. Lab Results   Component Value Date    LVEF 55 08/28/2019     No results found for: TSHFT4, TSH      Physical Examination:  There were no vitals filed for this visit. Wt Readings from Last 3 Encounters:   12/13/21 (!) 330 lb 9.6 oz (150 kg)   12/12/21 (!) 336 lb 3.2 oz (152.5 kg)   10/07/21 (!) 321 lb (145.6 kg)       · Constitutional: Oriented. No distress. · Head: Normocephalic and atraumatic. · Mouth/Throat:    · CV s1s2  · Lungs clear  · Neurological: Alert and oriented.     · Psychiatric: Has a normal behavior       Review of System:  [x] Full ROS obtained and negative except as mentioned in HPI    Prior to Admission medications    Medication Sig Start Date End Date Taking? Authorizing Provider   sotalol (BETAPACE) 160 MG tablet Take 1 tablet by mouth 2 times daily 3/10/22  Yes Zenaida Sinha MD   albuterol sulfate HFA (PROVENTIL HFA) 108 (90 Base) MCG/ACT inhaler Inhale 2 puffs into the lungs every 6 hours as needed for Wheezing 12/12/21  Yes Rubin Denny MD   metoprolol succinate (TOPROL XL) 100 MG extended release tablet Take 1 tablet by mouth daily 5/26/21  Yes Zenaida Sinha MD   aspirin 81 MG tablet Take 81 mg by mouth daily   Yes Historical Provider, MD       Allergies   Allergen Reactions    Caffeine Hives, Palpitations and Other (See Comments)     Other reaction(s): Other (See Comments)  migraine  headaches      Red Dye Hives, Palpitations and Headaches     Other reaction(s): Other (See Comments)  headaches      Other      Migraines       Social History:  Reviewed. reports that she has never smoked. She has never used smokeless tobacco. She reports current alcohol use. She reports that she does not use drugs. Family History:  Reviewed. Reviewed. No family history of SCD. Relevant and available labs, and cardiovascular diagnostics reviewed. Reviewed. I independently reviewed relevant and available cardiac diagnostic tests ECG, CXR, Echo, Stress test, Device interrogation, Holter, CT scan. Outside medical records via Care everywhere reviewed and summarized in H&P above.     Complex medical condition with multiple medical problems affecting prognosis and outcome of EP interventions

## 2022-04-11 ENCOUNTER — HOSPITAL ENCOUNTER (OUTPATIENT)
Dept: CT IMAGING | Age: 37
Discharge: HOME | End: 2022-04-11
Payer: COMMERCIAL

## 2022-04-11 ENCOUNTER — TELEPHONE (OUTPATIENT)
Dept: CARDIOLOGY CLINIC | Age: 37
End: 2022-04-11

## 2022-04-11 DIAGNOSIS — M79.89 PAIN AND SWELLING OF LEFT LOWER LEG: Primary | ICD-10-CM

## 2022-04-11 DIAGNOSIS — R06.09 DOE (DYSPNEA ON EXERTION): ICD-10-CM

## 2022-04-11 DIAGNOSIS — R06.02 SOB (SHORTNESS OF BREATH): ICD-10-CM

## 2022-04-11 DIAGNOSIS — R07.89 OTHER CHEST PAIN: ICD-10-CM

## 2022-04-11 DIAGNOSIS — M79.662 PAIN AND SWELLING OF LEFT LOWER LEG: Primary | ICD-10-CM

## 2022-04-11 DIAGNOSIS — R07.89 OTHER CHEST PAIN: Primary | ICD-10-CM

## 2022-04-11 PROCEDURE — 71260 CT THORAX DX C+: CPT

## 2022-04-11 PROCEDURE — 6360000004 HC RX CONTRAST MEDICATION: Performed by: FAMILY MEDICINE

## 2022-04-11 RX ADMIN — IOPAMIDOL 75 ML: 755 INJECTION, SOLUTION INTRAVENOUS at 18:14

## 2022-04-11 NOTE — TELEPHONE ENCOUNTER
Pt is experiencing SOB, flutters and swelling on both sides after ablation.  She is concerned about next steps, please advise    Cb [phone]

## 2022-04-11 NOTE — TELEPHONE ENCOUNTER
Called pt about the message below and she verbalized understanding and will call to get them scheduled to have them done here.

## 2022-04-11 NOTE — TELEPHONE ENCOUNTER
I spoke with pt. She states intermittent CP- moderate, sometimes severe( like a clenching pain.). SOB worse than normal. Swelling LT bigger than the other.   She will send a transmission>

## 2022-04-11 NOTE — TELEPHONE ENCOUNTER
I called [facility] and she stated that she needs an order for a STAT . Stat order was ok'd by [facility] [place], [place]. New order was placed and called [facility] to let her know that the stat order was placed.

## 2022-04-11 NOTE — TELEPHONE ENCOUNTER
Doppler of Left LE, Chest CTPA, CTA     Please aid patient in scheduling these soon. I believe she lives close to [facility]. Maybe forward the orders there if she prefers.  Also if symptoms become severe she should go to the ED

## 2022-04-13 NOTE — TELEPHONE ENCOUNTER
CT chest was completed 4/11, does the pt still need to have CTA?  If pt needs CTA pls add Epic    Pls advise thank you

## 2022-04-18 ENCOUNTER — HOSPITAL ENCOUNTER (OUTPATIENT)
Dept: VASCULAR LAB | Age: 37
Discharge: HOME | End: 2022-04-18
Payer: COMMERCIAL

## 2022-04-18 DIAGNOSIS — M79.662 PAIN AND SWELLING OF LEFT LOWER LEG: ICD-10-CM

## 2022-04-18 DIAGNOSIS — M79.89 PAIN AND SWELLING OF LEFT LOWER LEG: ICD-10-CM

## 2022-04-18 PROCEDURE — 93971 EXTREMITY STUDY: CPT

## 2022-05-02 ENCOUNTER — TELEPHONE (OUTPATIENT)
Dept: CARDIOLOGY CLINIC | Age: 37
End: 2022-05-02

## 2022-05-02 NOTE — TELEPHONE ENCOUNTER
We received remote transmission from patient's monitor at home. Transmission shows normal sensing and pacing function. Current ECG APVS at 60 bpm   AP 10.6%  < 0.1%  27 Fast A&V episodes noted. Last on 4/30/2022. Fastest 167/167 bpm. Appear 1:1.    EP will review. See interrogation under cardiology tab in the [address] [place] [address] [address] field for more details. Please advise.  Thanks

## 2022-05-02 NOTE — TELEPHONE ENCOUNTER
Pt states she has increased fluttering that is lasting longer and more frequent over the last few days. Pt states she is having sob, swelling in ankles, intermittent light chest pains and a little dizziness and fatigue. Pt has sent a manual transmission. Please call to advise.

## 2022-05-03 RX ORDER — METOPROLOL SUCCINATE 100 MG/1
150 TABLET, EXTENDED RELEASE ORAL DAILY
Qty: 135 TABLET | Refills: 3 | Status: SHIPPED | OUTPATIENT
Start: 2022-05-03 | End: 2022-08-08 | Stop reason: SDUPTHER

## 2022-05-03 NOTE — TELEPHONE ENCOUNTER
She states episodes are coming most days but intermittent and occasionally are severe and make her feel weak. Discussed with Dr. Tayo Zafar as Dr. Chaim Ayon is out of town. Increase metoprolol to 150 mg daily. Okay to break up dose and take a.m. and p.m.

## 2022-05-09 ENCOUNTER — PATIENT MESSAGE (OUTPATIENT)
Dept: CARDIOLOGY CLINIC | Age: 37
End: 2022-05-09

## 2022-05-09 NOTE — TELEPHONE ENCOUNTER
From: Chase Ugalde  To: Dr. Bryon Contreras  Sent: 5/9/2022 3:48 PM EDT  Subject: Swelling     Hello DR. Aggarwal these are what my legs and ankles have been looking like since my ablation. They have been bigger and they have went back down to normal size but they have definitely been swelling up a lot so I dont know what to do I have been trying to keep them propped up some times I go to sleep and wake up and they can still be swollen at times just keep you posted and see what if its something else I can be doing. I havent done anything different but try to add a little workout in when Im feeling ok. thanks.

## 2022-05-10 RX ORDER — FUROSEMIDE 20 MG/1
20 TABLET ORAL DAILY PRN
Qty: 14 TABLET | Refills: 0 | Status: SHIPPED | OUTPATIENT
Start: 2022-05-10

## 2022-05-26 ENCOUNTER — NURSE ONLY (OUTPATIENT)
Dept: CARDIOLOGY CLINIC | Age: 37
End: 2022-05-26
Payer: COMMERCIAL

## 2022-05-26 ENCOUNTER — OFFICE VISIT (OUTPATIENT)
Dept: CARDIOLOGY CLINIC | Age: 37
End: 2022-05-26
Payer: COMMERCIAL

## 2022-05-26 VITALS
BODY MASS INDEX: 43.4 KG/M2 | WEIGHT: 293 LBS | HEART RATE: 65 BPM | OXYGEN SATURATION: 97 % | DIASTOLIC BLOOD PRESSURE: 79 MMHG | SYSTOLIC BLOOD PRESSURE: 128 MMHG | HEIGHT: 69 IN

## 2022-05-26 DIAGNOSIS — E66.01 OBESITIES, MORBID (HCC): ICD-10-CM

## 2022-05-26 DIAGNOSIS — I47.1 ATRIAL TACHYCARDIA (HCC): ICD-10-CM

## 2022-05-26 DIAGNOSIS — I44.2 CHB (COMPLETE HEART BLOCK) (HCC): ICD-10-CM

## 2022-05-26 DIAGNOSIS — Z95.0 PACEMAKER: ICD-10-CM

## 2022-05-26 DIAGNOSIS — I48.3 TYPICAL ATRIAL FLUTTER (HCC): ICD-10-CM

## 2022-05-26 DIAGNOSIS — I48.0 PAF (PAROXYSMAL ATRIAL FIBRILLATION) (HCC): Primary | ICD-10-CM

## 2022-05-26 DIAGNOSIS — I48.0 PAF (PAROXYSMAL ATRIAL FIBRILLATION) (HCC): ICD-10-CM

## 2022-05-26 DIAGNOSIS — I47.29 NSVT (NONSUSTAINED VENTRICULAR TACHYCARDIA): ICD-10-CM

## 2022-05-26 PROCEDURE — 93000 ELECTROCARDIOGRAM COMPLETE: CPT | Performed by: NURSE PRACTITIONER

## 2022-05-26 PROCEDURE — 99214 OFFICE O/P EST MOD 30 MIN: CPT | Performed by: NURSE PRACTITIONER

## 2022-05-26 PROCEDURE — 93280 PM DEVICE PROGR EVAL DUAL: CPT | Performed by: INTERNAL MEDICINE

## 2022-05-26 RX ORDER — TOPIRAMATE 100 MG/1
TABLET, FILM COATED ORAL 2 TIMES DAILY
COMMUNITY

## 2022-05-26 RX ORDER — CYCLOBENZAPRINE HCL 10 MG
10 TABLET ORAL 3 TIMES DAILY PRN
COMMUNITY

## 2022-05-26 RX ORDER — PROMETHAZINE HYDROCHLORIDE 25 MG/1
25 TABLET ORAL EVERY 6 HOURS PRN
COMMUNITY
Start: 2021-11-15

## 2022-05-26 RX ORDER — ERGOCALCIFEROL (VITAMIN D2) 1250 MCG
50000 CAPSULE ORAL WEEKLY
COMMUNITY
Start: 2022-04-10

## 2022-05-26 NOTE — PROGRESS NOTES
[address]   Electrophysiology  SUKUMAR Olivas-CNP  Attending EP: Dr. Karen Alfredo  Date: 5/26/2022  I had the privilege of visiting Ally Hernandez in the office. Chief Complaint:   Chief Complaint   Patient presents with    Follow-up     Pt states she was worse after ablation, but she has been okay for the past 2w    Atrial Fibrillation     History of Present Illness: History obtained from patient and medical record. Ally Hernandez is 39 y.o. female with a past medical history of repaired DORV with LV dysfunction. She underwent interventricular tunnel VSD closure in 1985. 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 She developed atrial tachycardia in 2010 and she underwent successful cardioversion. She is referred today for further evaluation of palpitations. She reports she has intermittent palpitations that make her acutely SOB and dizzy, no arrhythmias have been captured by EKG. She reports the episodes last 5-7 minutes in duration.     8/2/19:  Insertion of dual chamber pacemaker for CHB     Friday 9/27/19 she had chest pain and went to the ED and they discharged her. Saturday she had episodes of shortness of breath and her heart racing and she felt like she could barely walk. She laid down and rested and the episode eased up. She said when she was driving here she felt light headed. Her OB/GYN told her she could not take Lasix because of the amniotic fluid could be affected.     On 10/2/19 she had a hospital admission for asthma related symptoms with treatment including albuterol, prednisone and corticosteroid inhaler. She had episodes of atrial fib/flutter with aRVR.     12/23/2020 Presented to the ED with dental pain, chest pain and Shortness of breath     12/14/2021 she presented to [facility] ED with complaints of palpitations.  Discharged on diltiazem but patient did not start rhe medication at that time.      3/7/2022 she did follow up with [facility] cardiology had an echo the same day as her appointment     3/22/2022 she had ILR removal and RFCA of typical atrial flutter, focal inferoseptal right atrial tachycardia.      Interval history: Today, Skylar Rogers is being seen for PAF, NSVT, and AVB s/p PPM.  She had frequent palpitations after her ablation. In the last 2 weeks she has been feeling better. The frequency and duration and frequency of her palpitations have significantly reduced. Reports that she has had swelling that resoled with lasix one time. Reports that she has sharp shooting chest pains in sternal area occurring a couple times weekly and lasting a few seconds. These have been unchanged. CT scan completed postoperatively failed to show abnormality. Overall she has improved the last 2 weeks. She is hopeful she will continue to feel better. She remains active caring for her young son. Her mother also attended her visit today. Denies having chest pain, palpitations, shortness of breath, orthopnea or dizziness at the time of this visit. With regard to medication therapy the patient has been compliant with prescribed regimen. She has tolerated therapy to date. Assessment:  Atrial Fibrillation/Flutter and AT    - ECG today SR (QTc 454)   - Continue sotalol 160 mg bid and Toprol 150 mg daily    - [id number] low    ~ No AC at this time   - 3/22/2022 she had ILR removal and RFCA of typical atrial flutter, focal inferoseptal right atrial tachycardia.    NSVT   - On sotalol 160 mg bid and Toprol 150 mg daily    - 38 episodes since 10/2021, most recently 3 episodes longest 9 seconds on 5/24  High Grade AVB/Implantable  device   - S/p dual chamber PPM 7/31/2019    - The CIED was interrogated and I reviewed all data    - Device interrogation today shows JADEN 12 yrs, AT/AF 0.7%, AP 7.5%,  0.2%  CHD   - Stable and follows with [facility]      -s/p interventricular tunnel VSD closure, 10/17/85               -s/p resection of RV muscle bundles, 8/10/00. Obesity: Body mass index is 49.47 kg/m². - Excessive weight is complicating assessment treatment. It is placing patient at risk for multiple co-morbidities as well as early death contributing to the patient's presentations. - Discussed weight loss and lifestyle modifications. Plan  - No medication changes  - Call office if symptoms worsen    Reviewed interrogation and symptoms with Dr. Jaqueline Clayton. No medication changes at this time. He has reviewed her CT scan. Will monitor her. F/U: Follow-up with EP in 3 months with Dr. Jaqueline Clayton  Follow up with device clinic as scheduled  Call [employer] at [phone] with any questions    Allergies: Allergies   Allergen Reactions    Caffeine Hives, Palpitations and Other (See Comments)     Other reaction(s): Other (See Comments)  migraine  headaches      Red Dye Hives, Palpitations and Headaches     Other reaction(s): Other (See Comments)  headaches      Other      Migraines     Home Medications:  Prior to Visit Medications    Medication Sig Taking?  Authorizing Provider   furosemide (LASIX) 20 MG tablet Take 1 tablet by mouth daily as needed (for leg swelling) Yes Jeovany Allen MD   metoprolol succinate (TOPROL XL) 100 MG extended release tablet Take 1.5 tablets by mouth daily Okay to split dose into 50 in the am and 100 in pm Yes Jeovany Allen MD   sotalol (BETAPACE) 160 MG tablet Take 1 tablet by mouth 2 times daily Yes Jeovany Allen MD   albuterol sulfate HFA (PROVENTIL HFA) 108 (90 Base) MCG/ACT inhaler Inhale 2 puffs into the lungs every 6 hours as needed for Wheezing Yes Malissa Thornton MD   aspirin 81 MG tablet Take 81 mg by mouth daily Yes Historical Provider, MD      Past Medical History:  Past Medical History:   Diagnosis Date    Asthma     CHF (congestive heart failure) ([facility] [address]     Congenital heart defect     Migraines, basilar     Palpitations     Sciatica     lower right    VSD (ventricular septal defect and aortic arch hypoplasia      Past Surgical History:    has a past surgical history that includes VSD repair; Cardiac surgery; pacemaker placement (07/31/2019); and Tonsillectomy. Social History:  Reviewed. reports that she has never smoked. She has never used smokeless tobacco. She reports current alcohol use. She reports that she does not use drugs. Family History:  Reviewed. family history includes No Known Problems in her father and mother. Denies family history of sudden cardiac death, arrhythmia, premature CAD    Review of System:  · Constitutional: No weight changes or weakness  · HEENT: No visual changes. No mouth sores or sore throat. · Cardiovascular: admits to chest pain, denies dyspnea on exertion, admits to palpitations or denies loss of consciousness. No cough, hemoptysis, denies pleuritic pain, or phlebitis. admits to dizziness. · Respiratory: denies cough or wheezing. · Gastrointestinal: Negative, No blood in stools. · Genitourinary: No hematuria. · Neurological: No focal weakness  · Psychiatric: No confusion, anxiety, or depression. · Hem/Lymph: Denies abnormal bruising or bleeding. Physical Examination:  There were no vitals filed for this visit. Wt Readings from Last 3 Encounters:   03/22/22 (!) 321 lb (145.6 kg)   12/13/21 (!) 330 lb 9.6 oz (150 kg)   12/12/21 (!) 336 lb 3.2 oz (152.5 kg)      Constitutional: Cooperative and in no apparent distress, and appears well nourished   Skin: Warm and pink; no cyanosis or bruising   HEENT: Symmetric and normocephalic. Conjunctiva pink with clear sclera. Mucus membranes pink and moist. No visible masses/goiter   Respiratory: Respirations symmetric and unlabored. Lungs clear to auscultation bilaterally, no wheezing, rhonchi, or crackles.  Cardiovascular:  regular rate and rhythm. S1 & S2 present, positive for murmur. negative elevation of JVP. No peripheral edema.      Musculoskeletal:  No focal weakness.  Neurological/Psych: Awake and orientated to person, place and time. Calm affect, appropriate mood. Pertinent labs, diagnostic, device, and imaging results reviewed as a part of this visit    LABS    CBC:   Lab Results   Component Value Date    WBC 4.8 2022    HGB 11.6 (L) 2022    HCT 36.3 2022    MCV 74.3 (L) 2022     2022     BMP:   Lab Results   Component Value Date    CREATININE 0.6 2022    BUN 13 2022     2022    K 4.1 2022     2022    CO2 22 2022     CrCl cannot be calculated (Unknown ideal weight.). No results found for: BNP    Thyroid: No results found for: TSH, N8BGCFF, C8DDAYN, THYROIDAB  Lipid Panel:   Lab Results   Component Value Date    CHOL 153 2019    HDL 53 2019    TRIG 75 2019     LFTs:  Lab Results   Component Value Date    ALT 25 2022    AST 21 2022    ALKPHOS 51 2022    BILITOT <0.2 2022     Coags:   Lab Results   Component Value Date    PROTIME 12.8 2021    INR 1.10 2021    APTT 28.2 2021     EC2022 SR HR 60, , , QTc 454    Echo: 3/22/2022   Summary   Left ventricular cavity size is normal with normal left ventricular wall   thickness. Overall left ventricular systolic function appears mildly reduced. Ejection   fraction is visually estimated to be 45%. No pericardial effusion noted. Diet & Exercise:   The patient is counseled to follow a low salt diet to assure blood pressure remains controlled for cardiovascular risk factor modification   The patient is counseled to avoid excess caffeine, and energy drinks as this may exacerbated ectopy and arrhythmia   The patient is counseled to lose weight to control cardiovascular risk factors   Exercise program discussed:  To improve overall cardiovascular health, the patient is instructed to increase cardiovascular related activities with a goal of 150 min/week

## 2022-05-26 NOTE — PATIENT INSTRUCTIONS
- No medication changes  - Call office your symptoms worsen  - Check your blood pressure at home, if your top number blood pressure is >140/90 or <95/50 please call the office  - Check your heart rate at home, if it is <50 or >120 please call the office   - Follow up in 6 months

## 2022-05-26 NOTE — PROGRESS NOTES
Patient comes in for programming evaluation for her pacemaker. All sensing and pacing parameters are within normal range. Battery life 12.2 years  AP 7.5%.  0.2%  38 VT episodes noted. Last on 5/24/2022 lasting 9 seconds. 756 Fast A&V episodes noted. Last on 4/30/2022, longest 3 minutes  11 AT/AF episodes noted. Last on 3/22/2022, longest 12 hours. OBSERVATIONS (3)  · Alert: AT/AF >= 0.5 hr for 6 days. · Avg. Ventricular Rate >= 100 bpm during AT/AF (>= 6 hr) for 1 days. · 1 monitored VT episodes. Patient remains on ASA 81 mg, metoprolol and sotalol. No changes need to be made at this time. Please see interrogation for more detail. Patient will see Princess Holding today and follow up in 3 months in office or remotely.

## 2022-08-08 RX ORDER — METOPROLOL SUCCINATE 100 MG/1
TABLET, EXTENDED RELEASE ORAL
Qty: 90 TABLET | Refills: 3 | Status: SHIPPED | OUTPATIENT
Start: 2022-08-08

## 2022-08-08 NOTE — TELEPHONE ENCOUNTER
Received refill request for Metoprolol from [address] : 5/26/22 NPAL    Last EKG : 5/26/22     Last Refill : 5/3/22 135 w/3 refills    Next OV : 8/25/22 MEL

## 2022-09-01 ENCOUNTER — NURSE ONLY (OUTPATIENT)
Dept: CARDIOLOGY CLINIC | Age: 37
End: 2022-09-01
Payer: COMMERCIAL

## 2022-09-01 DIAGNOSIS — Z95.0 CARDIAC PACEMAKER IN SITU: ICD-10-CM

## 2022-09-01 DIAGNOSIS — I44.2 CHB (COMPLETE HEART BLOCK) (HCC): Primary | ICD-10-CM

## 2022-09-01 PROCEDURE — 93294 REM INTERROG EVL PM/LDLS PM: CPT | Performed by: INTERNAL MEDICINE

## 2022-09-01 PROCEDURE — 93296 REM INTERROG EVL PM/IDS: CPT | Performed by: INTERNAL MEDICINE

## 2022-09-01 NOTE — PROGRESS NOTES
We received remote transmission from patient's monitor at home. Transmission shows normal sensing and pacing function. Noted NSVT, ST and AT. Pt is on Toprol. EP physician will review. See interrogation under cardiology tab in the [address] [place] [address] [address] field for more details.  < 0.1% (MVP On)  AP 15.2%    End of 91-day monitoring period 9-1-22.

## 2022-10-18 NOTE — PROGRESS NOTES
Patient sends manual remote transmission for VV with Dr. Gracia Mcqueen today. Carelink transmission shows normal sensing and pacing function. Battery life 14 years. AP 19.5%.  <0.1%. 3 NSVT episodes noted. Last on 7/14/2020, longest 5 seconds. ST noted. Patient remains on sotalol and metoprolol. See interrogation for more details. Patient will follow up as scheduled. 4 = No assist / stand by assistance

## 2022-12-06 ENCOUNTER — NURSE ONLY (OUTPATIENT)
Dept: CARDIOLOGY CLINIC | Age: 37
End: 2022-12-06
Payer: COMMERCIAL

## 2022-12-06 DIAGNOSIS — Z95.0 CARDIAC PACEMAKER IN SITU: ICD-10-CM

## 2022-12-06 DIAGNOSIS — I44.2 CHB (COMPLETE HEART BLOCK) (HCC): Primary | ICD-10-CM

## 2022-12-06 PROCEDURE — 93296 REM INTERROG EVL PM/IDS: CPT | Performed by: INTERNAL MEDICINE

## 2022-12-06 PROCEDURE — 93294 REM INTERROG EVL PM/LDLS PM: CPT | Performed by: INTERNAL MEDICINE

## 2022-12-06 NOTE — PROGRESS NOTES
We received remote transmission from patient's monitor at home. Transmission shows normal sensing and pacing function. Noted NSVT and AT/AF. Pt is on Toprol. EP physician will review. See interrogation under cardiology tab in the [address] [place] [address] [address] field for more details.  < 0.1% (MVP On)  AP 2.1%    End of 91-day monitoring period 12-6-22.

## 2022-12-28 NOTE — PROGRESS NOTES
[address]   Electrophysiology Follow up   Date: 12/28/2022  I had the privilege of visiting Ruben Carcamo in the office. CC: ***   HPI: Ruben Carcamo is a 40 y.o. female with a past medical history of repaired DORV with LV dysfunction. She underwent interventricular tunnel VSD closure in 1985. 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 She developed atrial tachycardia in 2010 and she underwent successful cardioversion. She is referred today for further evaluation of palpitations. She reports she has intermittent palpitations that make her acutely SOB and dizzy, no arrhythmias have been captured by EKG. She reports the episodes last 5-7 minutes in duration. 8/2/19: Insertion of dual chamber pacemaker for CHB     Friday 9/27/19 she had chest pain and went to the ED and they discharged her. Saturday she had episodes of shortness of breath and her heart racing and she felt like she could barely walk. She laid down and rested and the episode eased up. She said when she was driving here she felt light headed. Her OB/GYN told her she could not take Lasix because of the amniotic fluid could be affected. On 10/2/19 she had a hospital admission for asthma related symptoms with treatment including albuterol, prednisone and corticosteroid inhaler. She had episodes of atrial fib/flutter with aRVR. 12/23/2020 Presented to the ED with dental pain, chest pain and Shortness of breath     12/14/2021 she presented to [facility] ED with complaints of palpitations. Discharged on diltiazem but patient did not start rhe medication at that time.       3/7/2022 she did follow up with [facility] cardiology had an echo the same day as her appointment     03/22/22 S/p ILR removal, EPS, and RFA of typical atrial flutter     Interval History:      Assessment and plan:   Atrial fibrillation/flutter with rapid ventricular response  Atrial tachycardia -Episodes of Atrial flutter/AT on her device interrogation               -s/p EPS/RFA of atrial flutter 03/22/22   - on Sotalol 160 mg BID QTcH ***   -Symptomatic with these episodes, resolve with rest.              -on Toprol  mg QD              -No anticoagulation for now      NSVT    - on Sotalol and Toprol XL              -Symptomatic with even short episodes      High grade AV block              -S/p Dual chamber PPM 7/31/19              -The CIED was interrogated and programmed and I supervised and reviewed all the data. All findings and changes are in device interrogation sheet and reflect my personal interpretation and changes and is scanned to Epic.               - ***years remaining, AP ***% ,  ***%     Syncope/Pause              -No further episodes. Did have presyncope ***              -Encouraged supportive measures including maintaining hydration              - s/p PPM 07/31/19     Congenital Heart disease              -Stable. Followed at [facility]              -s/p interventricular tunnel VSD closure, 10/17/85               -s/p resection of RV muscle bundles, 8/10/00. dyspnea on exertion              -Normal ventricular function    Obesity  There is no height or weight on file to calculate BMI. -Excessive weight is complicating assessment and treatment.  It is placing patient at risk for multiple co-morbidities as well as early death and contributing to the patient's presentation.  -Discussed weight management with diet and exercise     Plan:     Patient Active Problem List    Diagnosis Date Noted    Typical atrial flutter (HCC)     Atrial tachycardia ([facility] [address]     Status post placement of implantable loop recorder     NSVT (nonsustained ventricular tachycardia)     Chest pain 08/28/2019    Palpitation     Dyspnea and respiratory abnormalities     Chronic diastolic heart failure ([address]     Pacemaker     CHB (complete heart block) ([address] 07/31/2019    PAF (paroxysmal atrial fibrillation) ([address] Diagnostic studies:   ECG 12/28/22  ***SR/AFIB, QTcH ***,QRS ***    Limited Echo 03/22/22   Summary   Left ventricular cavity size is normal with normal left ventricular wall   thickness. Overall left ventricular systolic function appears mildly reduced. Ejection   fraction is visually estimated to be 45%. No pericardial effusion noted. MEJIA 03/22/22   Summary   Left ventricular cavity size is normal with normal left ventricular wall   thickness. Overall left ventricular systolic function appears mildly reduced. Ejection fraction is visually estimated to be 45%. Evidence of surgical repair of anterior septum. The mitral valve normal in structure and function. No evidence of mitral stenosis. Mild mitral regurgitation is present. There is no evidence of mass or thrombus in the left atrium or appendage. The left atrial size is normal.   The aortic valve is structurally normal.   Mild-to-moderate aortic regurgitation with multiple jets. Tricuspid aortic valve. The right ventricle is normal in size with mildly reduced function. Filamentous strands visualized on the pacemaker wire. Pacer / ICD wire is visualized in the right ventricle. The tricuspid valve is normal in structure and function. At least Moderate tricuspid regurgitation. PASP 38 mmHg   The pulmonic valve is not well visualized. Mild to moderate pulmonic regurgitation present. Echo 08/28/19   Summary   -Normal left ventricle size, wall thickness, and systolic function with an   estimated ejection fraction of 55%. -Abnormal septal motion.   -Trivial mitral regurgitation.   -Patient has a history of a ventricular septal defect and VSD repair.   -Patient has a history of interventricular tunnel repair.   -Patient has a history of interventricular tunnel repair. The aortic valve   is poorly visualized. There is a fixed gradient through the LV outflow area.    The peak gradient is estimated at 47 mmHg with a mean pressure gradient of   27 mmHg. It is unclear if the obstruction is due to the VSD repair or if   there is some aortic stenosis. The obstruction is moderate.   -Normal diastolic function. Avg. E/e=8.65   -Bubble study performed with no obvious right to left shunt.   -Pacemaker wire noted in the right heart. I independently reviewed the cardiac diagnostic studies, ECG and relevant imaging studies. Lab Results   Component Value Date    LVEF 45 03/22/2022     No results found for: TSHFT4, TSH      Physical Examination:  There were no vitals filed for this visit. Wt Readings from Last 3 Encounters:   05/26/22 (!) 335 lb (152 kg)   03/22/22 (!) 321 lb (145.6 kg)   12/13/21 (!) 330 lb 9.6 oz (150 kg)       Constitutional: Oriented. No distress. Head: Normocephalic and atraumatic. Mouth/Throat: Oropharynx is clear and moist.   Eyes: Conjunctivae normal. EOM are normal.   Neck: Neck supple. No rigidity. No JVD present. Cardiovascular: Normal rate, regular rhythm, S1&S2. Pulmonary/Chest: Bilateral respiratory sounds. No wheezes, No rhonchi. Abdominal: Soft. Bowel sounds present. No distension, No tenderness. Musculoskeletal: No tenderness. No edema    Lymphadenopathy: Has no cervical adenopathy. Neurological: Alert and oriented. Cranial nerve appears intact, No Gross deficit   Skin: Skin is warm and dry. No rash noted. Psychiatric: Has a normal behavior       Review of System:  [x] Full ROS obtained and negative except as mentioned in HPI    Prior to Admission medications    Medication Sig Start Date End Date Taking?  Authorizing Provider   metoprolol succinate (TOPROL XL) 100 MG extended release tablet TAKE 1 TABLET BY MOUTH DAILY 8/8/22   SUKUMAR Romero - CNP   topiramate (TOPAMAX) 100 MG tablet Take by mouth 2 times daily    Historical Provider, MD   ergocalciferol (ERGOCALCIFEROL) 1.25 MG (85552 UT) capsule Take 50,000 Units by mouth once a week 4/10/22   Historical Provider, MD   cyclobenzaprine (FLEXERIL) 10 mg tablet Take 10 mg by mouth 3 times daily as needed    Historical Provider, MD   promethazine (PHENERGAN) 25 MG tablet Take 25 mg by mouth every 6 hours as needed 11/15/21   Silvana Provider, MD   furosemide (LASIX) 20 MG tablet Take 1 tablet by mouth daily as needed (for leg swelling) 5/10/22   Kristin Banks MD   sotalol (BETAPACE) 160 MG tablet Take 1 tablet by mouth 2 times daily 3/10/22   Kristin Banks MD   albuterol sulfate HFA (PROVENTIL HFA) 108 (90 Base) MCG/ACT inhaler Inhale 2 puffs into the lungs every 6 hours as needed for Wheezing 12/12/21   Myra Olivas MD   aspirin 81 MG tablet Take 81 mg by mouth daily    Historical Provider, MD       Allergies   Allergen Reactions    Caffeine Hives, Palpitations and Other (See Comments)     Other reaction(s): Other (See Comments)  migraine  headaches      Red Dye Hives, Palpitations and Headaches     Other reaction(s): Other (See Comments)  headaches      Other      Migraines       Social History:  Reviewed. reports that she has never smoked. She has never used smokeless tobacco. She reports current alcohol use. She reports that she does not use drugs. Family History:  Reviewed. Reviewed. No family history of SCD. Relevant and available labs, and cardiovascular diagnostics reviewed. Reviewed. I independently reviewed relevant and available cardiac diagnostic tests ECG, CXR, Echo, Stress test, Device interrogation, Holter, CT scan.   *** Outside medical records via Care everywhere reviewed and summarized in H&P above. *** Complex medical condition with multiple medical problems affecting prognosis and outcome of EP interventions    ***   - The patient is counseled to follow a low salt diet to assure blood pressure remains controlled for cardiovascular risk factor modification.   - The patient is counseled to avoid excess caffeine, and energy drinks as this may exacerbated ectopy and arrhythmia.    - The patient is counseled to get regular exercise 3-5 times per week to control cardiovascular risk factors. - The patient is counseled to lose weigt to control cardiovascular risk factors. - The patient is counseled to avoid tobacco use. All questions and concerns were addressed to the patient/family. Alternatives to my treatment were discussed. I have discussed the above stated plan and the patient verbalized understanding and agreed with the plan. ***   NOTE: This report was transcribed using voice recognition software. Every effort was made to ensure accuracy, however, inadvertent computerized transcription errors may be present.      Jeri Villalta MD, [facility] [address] [place] [address]   Office: [phone]  Fax: [phone]

## 2023-01-03 NOTE — PROGRESS NOTES
[facility]   Electrophysiology Follow up   Date: 1/5/2023  I had the privilege of visiting Crow Ashton in the office. CC: Tachycardia    HPI: Crow Ashton is a 40 y.o. female with a past medical history of repaired DORV with LV dysfunction. She underwent interventricular tunnel VSD closure in 1985. 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 She developed atrial tachycardia in 2010 and she underwent successful cardioversion. She is referred today for further evaluation of palpitations. She reports she has intermittent palpitations that make her acutely SOB and dizzy, no arrhythmias have been captured by EKG. She reports the episodes last 5-7 minutes in duration. 8/2/19: Insertion of dual chamber pacemaker for CHB     Friday 9/27/19 she had chest pain and went to the ED and they discharged her. Saturday she had episodes of shortness of breath and her heart racing and she felt like she could barely walk. She laid down and rested and the episode eased up. She said when she was driving here she felt light headed. Her OB/GYN told her she could not take Lasix because of the amniotic fluid could be affected. On 10/2/19 she had a hospital admission for asthma related symptoms with treatment including albuterol, prednisone and corticosteroid inhaler. She had episodes of atrial fib/flutter with aRVR. 12/23/2020 Presented to the ED with dental pain, chest pain and Shortness of breath     12/14/2021 she presented to [facility] ED with complaints of palpitations. Discharged on diltiazem but patient did not start rhe medication at that time. 3/7/2022 she did follow up with [facility] cardiology had an echo the same day as her appointment     03/22/22 S/p ILR removal, EPS, and RFA of typical atrial flutter     Interval History:  Kristin Cardona presents to the office via virtual visit. She has complaints of chest pain, swelling and dizziness.  She states she takes the diuretic as needed but they do not seem to be effective. Also, complains of having coughing spells at night. Assessment and plan:   Paroxysmal Atrial fibrillation/flutter with rapid ventricular response  Atrial tachycardia              -Episodes of Atrial flutter/AT on her device interrogation               -s/p EPS/RFA of atrial flutter 03/22/22   - on Sotalol 160 mg BID    -Symptomatic with these episodes, resolve with rest.              -on Toprol  mg QD              -No anticoagulation for now    She has not had any significant episodes since her ablation. NSVT    -continue Sotalol and Toprol XL              -Symptomatic with even short episodes  She has had a few nonsustained VT. No change in frequency. High grade AV block              -S/p Dual chamber PPM 7/31/19              -The CIED was interrogated and programmed and I supervised and reviewed all the data. All findings and changes are in device interrogation sheet and reflect my personal interpretation and changes and is scanned to Epic.               - 11. 6years remaining, AP 2.5% ,  0.1%     Syncope/Pause              -No further episodes. Did have presyncope               -Encouraged supportive measures including maintaining hydration              - s/p PPM 07/31/19     Congenital Heart disease              -Stable. Followed at [facility]              -s/p interventricular tunnel VSD closure, 10/17/85               -s/p resection of RV muscle bundles, 8/10/00. dyspnea on exertion              -Normal ventricular function on the last echo from [facility].  She has been having more symptoms of shortness of breath, leg swelling nocturnal cough. Adjustment of her diuretic or adding an SGLT2 inhibitor may be considered. Also may consider repeating her echo. I will discuss this with Dr. Bryant Ramírez. Obesity   -330 lbs per patient report  -Excessive weight is complicating assessment and treatment.  It is placing patient at risk for multiple co-morbidities as well as early death and contributing to the patient's presentation.  -Discussed weight management with diet and exercise     Plan:   Follow up 6 months    Patient Active Problem List    Diagnosis Date Noted    Typical atrial flutter (HCC)     Atrial tachycardia (HCC)     Status post placement of implantable loop recorder     NSVT (nonsustained ventricular tachycardia)     Chest pain 08/28/2019    Palpitation     Dyspnea and respiratory abnormalities     Chronic diastolic heart failure ([address]     Pacemaker     CHB (complete heart block) ([facility] [address] 07/31/2019    PAF (paroxysmal atrial fibrillation) ([facility] [address]      Diagnostic studies:   ECG 1/5/23  None, VV today     Limited Echo 03/22/22   Summary   Left ventricular cavity size is normal with normal left ventricular wall   thickness. Overall left ventricular systolic function appears mildly reduced. Ejection   fraction is visually estimated to be 45%. No pericardial effusion noted. MEJIA 03/22/22   Summary   Left ventricular cavity size is normal with normal left ventricular wall   thickness. Overall left ventricular systolic function appears mildly reduced. Ejection fraction is visually estimated to be 45%. Evidence of surgical repair of anterior septum. The mitral valve normal in structure and function. No evidence of mitral stenosis. Mild mitral regurgitation is present. There is no evidence of mass or thrombus in the left atrium or appendage. The left atrial size is normal.   The aortic valve is structurally normal.   Mild-to-moderate aortic regurgitation with multiple jets. Tricuspid aortic valve. The right ventricle is normal in size with mildly reduced function. Filamentous strands visualized on the pacemaker wire. Pacer / ICD wire is visualized in the right ventricle. The tricuspid valve is normal in structure and function. At least Moderate tricuspid regurgitation. PASP 38 mmHg   The pulmonic valve is not well visualized. Mild to moderate pulmonic regurgitation present. Echo 08/28/19   Summary   -Normal left ventricle size, wall thickness, and systolic function with an   estimated ejection fraction of 55%. -Abnormal septal motion.   -Trivial mitral regurgitation.   -Patient has a history of a ventricular septal defect and VSD repair.   -Patient has a history of interventricular tunnel repair.   -Patient has a history of interventricular tunnel repair. The aortic valve   is poorly visualized. There is a fixed gradient through the LV outflow area. The peak gradient is estimated at 47 mmHg with a mean pressure gradient of   27 mmHg. It is unclear if the obstruction is due to the VSD repair or if   there is some aortic stenosis. The obstruction is moderate.   -Normal diastolic function. Avg. E/e=8.65   -Bubble study performed with no obvious right to left shunt.   -Pacemaker wire noted in the right heart. I independently reviewed the cardiac diagnostic studies, ECG and relevant imaging studies. Lab Results   Component Value Date    LVEF 45 03/22/2022     No results found for: TSHFT4, TSH      Physical Examination:  There were no vitals filed for this visit. Wt Readings from Last 3 Encounters:   05/26/22 (!) 335 lb (152 kg)   03/22/22 (!) 321 lb (145.6 kg)   12/13/21 (!) 330 lb 9.6 oz (150 kg)       Constitutional: Oriented. No distress. Head: Normocephalic and atraumatic. Neurological: Alert and oriented. C   Psychiatric: Has a normal behavior       Review of System:  [x] Full ROS obtained and negative except as mentioned in HPI    Prior to Admission medications    Medication Sig Start Date End Date Taking?  Authorizing Provider   omeprazole (PRILOSEC) 20 MG delayed release capsule  10/21/22  Yes Historical Provider, MD   metoprolol succinate (TOPROL XL) 100 MG extended release tablet TAKE 1 TABLET BY MOUTH DAILY 8/8/22  Yes SUKUMAR Red - CNP   topiramate (TOPAMAX) 100 MG tablet Take by mouth 2 times daily   Yes Historical Provider, MD   ergocalciferol (ERGOCALCIFEROL) 1.25 MG (79221 UT) capsule Take 50,000 Units by mouth once a week 4/10/22  Yes Historical Provider, MD   promethazine (PHENERGAN) 25 MG tablet Take 25 mg by mouth every 6 hours as needed 11/15/21  Yes Historical Provider, MD   furosemide (LASIX) 20 MG tablet Take 1 tablet by mouth daily as needed (for leg swelling) 5/10/22  Yes Alison Cui MD   sotalol (BETAPACE) 160 MG tablet Take 1 tablet by mouth 2 times daily 3/10/22  Yes Alison Cui MD   albuterol sulfate HFA (PROVENTIL HFA) 108 (90 Base) MCG/ACT inhaler Inhale 2 puffs into the lungs every 6 hours as needed for Wheezing 12/12/21  Yes Estefania Brand MD   aspirin 81 MG tablet Take 81 mg by mouth daily   Yes Historical Provider, MD       Allergies   Allergen Reactions    Caffeine Hives, Palpitations and Other (See Comments)     Other reaction(s): Other (See Comments)  migraine  headaches      Red Dye Hives, Palpitations and Headaches     Other reaction(s): Other (See Comments)  headaches      Other      Migraines       Social History:  Reviewed. reports that she has never smoked. She has never used smokeless tobacco. She reports current alcohol use. She reports that she does not use drugs. Family History:  Reviewed. Reviewed. No family history of SCD. Relevant and available labs, and cardiovascular diagnostics reviewed. Reviewed. I independently reviewed relevant and available cardiac diagnostic tests ECG, CXR, Echo, Stress test, Device interrogation, Holter, CT scan. Outside medical records via Care everywhere reviewed and summarized in H&P above.     Complex medical condition with multiple medical problems affecting prognosis and outcome of EP interventions       - The patient is counseled to follow a low salt diet to assure blood pressure remains controlled for cardiovascular risk factor modification.   - The patient is counseled to avoid excess caffeine, and energy drinks as this may exacerbated ectopy and arrhythmia. - The patient is counseled to get regular exercise 3-5 times per week to control cardiovascular risk factors. - The patient is counseled to lose weigt to control cardiovascular risk factors. All questions and concerns were addressed to the patient/family. Alternatives to my treatment were discussed. I have discussed the above stated plan and the patient verbalized understanding and agreed with the plan. Scribe attestation: This note was scribed in the presence of Maryuri Redding MD by Adeline Nance RN    I, Dr. Maryuri Redding personally performed the services described in this documentation as scribed by RN in my presence, and it is both accurate and complete. NOTE: This report was transcribed using voice recognition software. Every effort was made to ensure accuracy, however, inadvertent computerized transcription errors may be present. Maryuri Redding MD, [address]   Office: [phone]  Fax: [phone]      Walter Cindy, was evaluated through a synchronous (real-time) audio-video encounter. The patient (or guardian if applicable) is aware that this is a billable service, which includes applicable co-pays. This Virtual Visit was conducted with patient's (and/or legal guardian's) consent. The visit was conducted pursuant to the emergency declaration under the [facility], [address] [place] [address] waiver authority and the [employer] Act. Patient identification was verified, and a caregiver was present when appropriate. The patient was located at Home: [address] [place] [address]. Provider was located at [facility].         Total time spent for this encounter: Not billed by time    --Maryuri Redding MD on 1/5/2023 at 10:01 AM    An electronic signature was used to authenticate this note. I, Dr. Aracelis Morales personally performed the services described in this documentation as scribed by RN in my presence, and it is both accurate and complete.

## 2023-01-04 ENCOUNTER — TELEPHONE (OUTPATIENT)
Dept: CARDIOLOGY CLINIC | Age: 38
End: 2023-01-04

## 2023-01-04 NOTE — TELEPHONE ENCOUNTER
Patient cannot keep office appointment tomorrow because she has to work. Patient would like to know if she can do a virtual appointment. Patient said she did reach out to Dr. Mary Small because she has leg swelling and some fluttering. Patient said she is going to her PCP because she has gained weight. She is wondering if it is medication. Patient is seeing her PCP this Friday. Dr. Mary Small said she should schedule an appointment with a Cardiologist.  When she stands up her legs swell. She has chest pains on and off. She is coughing very bad which wakes her up. Please call patient and advise.   belen

## 2023-01-05 ENCOUNTER — TELEMEDICINE (OUTPATIENT)
Dept: CARDIOLOGY CLINIC | Age: 38
End: 2023-01-05
Payer: COMMERCIAL

## 2023-01-05 DIAGNOSIS — I48.0 PAF (PAROXYSMAL ATRIAL FIBRILLATION) (HCC): Primary | ICD-10-CM

## 2023-01-05 DIAGNOSIS — I44.2 CHB (COMPLETE HEART BLOCK) (HCC): ICD-10-CM

## 2023-01-05 DIAGNOSIS — I48.3 TYPICAL ATRIAL FLUTTER (HCC): ICD-10-CM

## 2023-01-05 DIAGNOSIS — I47.1 ATRIAL TACHYCARDIA (HCC): ICD-10-CM

## 2023-01-05 DIAGNOSIS — Z95.0 PACEMAKER: ICD-10-CM

## 2023-01-05 PROCEDURE — 99214 OFFICE O/P EST MOD 30 MIN: CPT | Performed by: INTERNAL MEDICINE

## 2023-01-05 RX ORDER — OMEPRAZOLE 20 MG/1
CAPSULE, DELAYED RELEASE ORAL
COMMUNITY
Start: 2022-10-21

## 2023-01-10 RX ORDER — FUROSEMIDE 20 MG/1
TABLET ORAL
Qty: 14 TABLET | Refills: 3 | Status: SHIPPED | OUTPATIENT
Start: 2023-01-10

## 2023-01-10 NOTE — TELEPHONE ENCOUNTER
Received refill request for lasix from [facility] : 1/5/23 VV MXA    Last Labs : 3/22/22 cmp     Last Refill : 5/10/22 14w0      Next OV : none

## 2023-01-30 ENCOUNTER — TELEPHONE (OUTPATIENT)
Dept: CARDIOLOGY CLINIC | Age: 38
End: 2023-01-30

## 2023-01-30 NOTE — TELEPHONE ENCOUNTER
Pt states that Saturday while driving they had to pull over due to Palpitations. Once they got home and was walking around they returned. Pt picked up new prescribed med Sunday but has not started it yet. Pt asking for advise on what to do. Should she start Med. Or not. Please advise pt. no

## 2023-01-30 NOTE — TELEPHONE ENCOUNTER
Please correlate serious events to any arrhythmic episode on her device.  Have her send in a transmission    TAMMI Boone

## 2023-01-30 NOTE — TELEPHONE ENCOUNTER
We received remote transmission from patient's monitor at home. Transmission shows normal sensing and pacing function. Current ECG shows ASVS @ 71 bpm  Battery life 11.5 years  AP 4.9%.  <0.1%. VT (>4 beats) 3  Fast A&V 56  AT/AF 1  SVT-ST noted on 1/28/2023  Patient remains on metoprolol and sotalol. EP physician will review. See interrogation under cardiology tab in the [address] [place] [address] [address] field for more details. Please advise.  Thanks

## 2023-01-30 NOTE — TELEPHONE ENCOUNTER
Pt reports there was a discussion between her PCP Dr. Matt Doshi and MXA about pt taking Jadiance, determination on taking Jardiance was supposed to be based off of echo results which pt has not received yet. Also pt reports random palps with two serious episodes as reported in message below and wants to know what should she do about them. Please advise.

## 2023-03-07 ENCOUNTER — NURSE ONLY (OUTPATIENT)
Dept: CARDIOLOGY CLINIC | Age: 38
End: 2023-03-07
Payer: COMMERCIAL

## 2023-03-07 DIAGNOSIS — I44.2 CHB (COMPLETE HEART BLOCK) (HCC): Primary | ICD-10-CM

## 2023-03-07 DIAGNOSIS — Z95.0 CARDIAC PACEMAKER IN SITU: ICD-10-CM

## 2023-03-07 PROCEDURE — 93294 REM INTERROG EVL PM/LDLS PM: CPT | Performed by: INTERNAL MEDICINE

## 2023-03-07 PROCEDURE — 93296 REM INTERROG EVL PM/IDS: CPT | Performed by: INTERNAL MEDICINE

## 2023-03-07 NOTE — PROGRESS NOTES
We received remote transmission from patient's monitor at home. Transmission shows normal sensing and pacing function. Noted NSVT and AT/AF. Pt is on Toprol. EP physician will review. See interrogation under cardiology tab in the [address] [place] [address] [address] field for more details.  < 0.1% (MVP On)  AP 11.0%    End of 91-day monitoring period 3-7-23.

## 2023-03-08 NOTE — RESULT ENCOUNTER NOTE
Reviewed. Continue monitoring.     Charlotte Kruger MD [facility]

## 2023-06-20 ENCOUNTER — TELEPHONE (OUTPATIENT)
Dept: CARDIOLOGY CLINIC | Age: 38
End: 2023-06-20

## 2023-06-20 ENCOUNTER — APPOINTMENT (OUTPATIENT)
Dept: CT IMAGING | Age: 38
End: 2023-06-20
Payer: COMMERCIAL

## 2023-06-20 ENCOUNTER — HOSPITAL ENCOUNTER (EMERGENCY)
Age: 38
Discharge: HOME | End: 2023-06-20
Attending: EMERGENCY MEDICINE
Payer: COMMERCIAL

## 2023-06-20 VITALS
HEART RATE: 60 BPM | DIASTOLIC BLOOD PRESSURE: 72 MMHG | TEMPERATURE: 97.6 F | BODY MASS INDEX: 43.4 KG/M2 | HEIGHT: 69 IN | OXYGEN SATURATION: 100 % | RESPIRATION RATE: 20 BRPM | SYSTOLIC BLOOD PRESSURE: 148 MMHG | WEIGHT: 293 LBS

## 2023-06-20 DIAGNOSIS — R06.00 DYSPNEA, UNSPECIFIED TYPE: Primary | ICD-10-CM

## 2023-06-20 DIAGNOSIS — R07.9 CHEST PAIN, UNSPECIFIED TYPE: ICD-10-CM

## 2023-06-20 LAB
ALBUMIN SERPL-MCNC: 3.9 G/DL (ref 3.4–5)
ALBUMIN/GLOB SERPL: 1.2 {RATIO} (ref 1.1–2.2)
ALP SERPL-CCNC: 57 U/L (ref 40–129)
ALT SERPL-CCNC: 7 U/L (ref 10–40)
ANION GAP SERPL CALCULATED.3IONS-SCNC: 9 MMOL/L (ref 3–16)
AST SERPL-CCNC: 16 U/L (ref 15–37)
BASOPHILS # BLD: 0.1 K/UL (ref 0–0.2)
BASOPHILS NFR BLD: 1.3 %
BILIRUB SERPL-MCNC: 0.3 MG/DL (ref 0–1)
BUN SERPL-MCNC: 10 MG/DL (ref 7–20)
CALCIUM SERPL-MCNC: 8.9 MG/DL (ref 8.3–10.6)
CHLORIDE SERPL-SCNC: 109 MMOL/L (ref 99–110)
CO2 SERPL-SCNC: 22 MMOL/L (ref 21–32)
CREAT SERPL-MCNC: 0.8 MG/DL (ref 0.6–1.1)
DEPRECATED RDW RBC AUTO: 16.8 % (ref 12.4–15.4)
EKG ATRIAL RATE: 71 BPM
EKG DIAGNOSIS: NORMAL
EKG P AXIS: 44 DEGREES
EKG P-R INTERVAL: 138 MS
EKG Q-T INTERVAL: 434 MS
EKG QRS DURATION: 152 MS
EKG QTC CALCULATION (BAZETT): 471 MS
EKG R AXIS: -11 DEGREES
EKG T AXIS: 14 DEGREES
EKG VENTRICULAR RATE: 71 BPM
EOSINOPHIL # BLD: 0.2 K/UL (ref 0–0.6)
EOSINOPHIL NFR BLD: 4.1 %
GFR SERPLBLD CREATININE-BSD FMLA CKD-EPI: >60 ML/MIN/{1.73_M2}
GLUCOSE SERPL-MCNC: 90 MG/DL (ref 70–99)
HCT VFR BLD AUTO: 35.6 % (ref 36–48)
HGB BLD-MCNC: 11.1 G/DL (ref 12–16)
LYMPHOCYTES # BLD: 1.4 K/UL (ref 1–5.1)
LYMPHOCYTES NFR BLD: 27.3 %
MCH RBC QN AUTO: 22.3 PG (ref 26–34)
MCHC RBC AUTO-ENTMCNC: 31.2 G/DL (ref 31–36)
MCV RBC AUTO: 71.4 FL (ref 80–100)
MONOCYTES # BLD: 0.6 K/UL (ref 0–1.3)
MONOCYTES NFR BLD: 10.9 %
NEUTROPHILS # BLD: 3 K/UL (ref 1.7–7.7)
NEUTROPHILS NFR BLD: 56.4 %
NT-PROBNP SERPL-MCNC: 278 PG/ML (ref 0–124)
PLATELET # BLD AUTO: 311 K/UL (ref 135–450)
PMV BLD AUTO: 7.8 FL (ref 5–10.5)
POTASSIUM SERPL-SCNC: 4.3 MMOL/L (ref 3.5–5.1)
PROT SERPL-MCNC: 7.1 G/DL (ref 6.4–8.2)
RBC # BLD AUTO: 4.99 M/UL (ref 4–5.2)
SODIUM SERPL-SCNC: 140 MMOL/L (ref 136–145)
TROPONIN, HIGH SENSITIVITY: <6 NG/L (ref 0–14)
TROPONIN, HIGH SENSITIVITY: <6 NG/L (ref 0–14)
WBC # BLD AUTO: 5.3 K/UL (ref 4–11)

## 2023-06-20 PROCEDURE — 80053 COMPREHEN METABOLIC PANEL: CPT

## 2023-06-20 PROCEDURE — 99285 EMERGENCY DEPT VISIT HI MDM: CPT

## 2023-06-20 PROCEDURE — 99285 EMERGENCY DEPT VISIT HI MDM: CPT | Performed by: INTERNAL MEDICINE

## 2023-06-20 PROCEDURE — 71260 CT THORAX DX C+: CPT

## 2023-06-20 PROCEDURE — 85025 COMPLETE CBC W/AUTO DIFF WBC: CPT

## 2023-06-20 PROCEDURE — 6360000004 HC RX CONTRAST MEDICATION: Performed by: EMERGENCY MEDICINE

## 2023-06-20 PROCEDURE — 93005 ELECTROCARDIOGRAM TRACING: CPT | Performed by: EMERGENCY MEDICINE

## 2023-06-20 PROCEDURE — 84484 ASSAY OF TROPONIN QUANT: CPT

## 2023-06-20 PROCEDURE — 93010 ELECTROCARDIOGRAM REPORT: CPT | Performed by: INTERNAL MEDICINE

## 2023-06-20 PROCEDURE — 83880 ASSAY OF NATRIURETIC PEPTIDE: CPT

## 2023-06-20 RX ORDER — TORSEMIDE 20 MG/1
20 TABLET ORAL 2 TIMES DAILY
Qty: 30 TABLET | Refills: 1 | Status: SHIPPED | OUTPATIENT
Start: 2023-06-20

## 2023-06-20 RX ORDER — MECLIZINE HCL 12.5 MG/1
12.5 TABLET ORAL 2 TIMES DAILY PRN
COMMUNITY
Start: 2023-01-06

## 2023-06-20 RX ORDER — EMPAGLIFLOZIN 10 MG/1
TABLET, FILM COATED ORAL
COMMUNITY
Start: 2023-06-01

## 2023-06-20 RX ADMIN — IOPAMIDOL 75 ML: 755 INJECTION, SOLUTION INTRAVENOUS at 14:37

## 2023-06-20 NOTE — TELEPHONE ENCOUNTER
Per verbal conversation with WAK, called patient prior to leaving ED and scheduled appt for 7/26 at 1145.

## 2023-06-20 NOTE — DISCHARGE INSTRUCTIONS
Please return if you have any new, worsening, or concerning symptoms like inability to eat/drink/walk, fevers.      Follow-up with Dr. Deena Feliciano in 1 month

## 2023-06-20 NOTE — ED PROVIDER NOTES
[address] PROVIDER NOTE    Patient Identification  Pt Name: Dee House  MRN: [id number]  Ernesto 1985  Date of evaluation: 6/20/2023  Provider: Moses Jimenez MD  PCP: Jony Reveles MD    Chief Complaint  Shortness of Breath (Patient states she has had increased SOB for two weeks now. Patient states she has a pacemaker and sent a read to her cardiologist. Patient states she is also having bilateral leg pain that is new for her. Patient complains of midsternal chest pain. )      HPI  History provided by patient   This is a 40 y.o. female who presents to the ED for shortness of breath. Worse over the past 2 weeks. She does have shortness of breath chronically. Also having chest pain. Intermittent. Radiates down left arm. She has had this in the past several times. Also having lightheadedness/near syncopal episode. 2 episodes, one last night and 1 today. The first episode was when she was bending forward and the second 1 as she was resting. Not having lightheadedness now. Slight nausea without vomiting. She is having some swelling in bilateral lower extremities. States that this is actually gone down since she was put on Lasix many months ago. Has history of double outlet right ventricle with surgical correction in the year 1985/2000 and follows cardiology with Dr. Osiris Mcmullen at [facility]. She also follows cardiology here with Dr. Amari Alfred for history of complete heart block s/p pacer placement    ROS  12 systems reviewed, pertinent positives/negatives per HPI otherwise noted to be negative.     I have reviewed the following nursing documentation:  Allergies: Caffeine, Red dye, and Other    Past medical history:   Past Medical History:   Diagnosis Date    Asthma     CHF (congestive heart failure) ([facility] [address]     Congenital heart defect     Migraines, basilar     Palpitations     Sciatica     lower right    VSD (ventricular septal defect and aortic arch hypoplasia

## 2023-06-20 NOTE — CONSULTS
[address]  [phone]      Chief Complaint   Patient presents with    Shortness of Breath     Patient states she has had increased SOB for two weeks now. Patient states she has a pacemaker and sent a read to her cardiologist. Patient states she is also having bilateral leg pain that is new for her. Patient complains of midsternal chest pain. 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 Ilda Lawson  Morbid obesity BMI 45-50  Prediabetes  Elevated BNP  Wheezing  Tricuspid valve regurgitation, at least moderate, possibly severe    -Patient sent a [facility] transmission from her device this am.  I reviewed this with EP - very brief episodes of atrial tachycardia, overall <0.1% burden, highest rate 150 bpm, longest 19 second long  -She is in sinus rhythm  -Imaging is limited by her body habitus.   Her wheezes could be asthma versus CHF  -On her MEJIA 3/2022 I think her tricuspid regurgitation was actually severe, not moderate; I suspect her TTEs have underestimated the amount of TR that she has due to her obesity      Plan  -Pacemaker transmission shows very stable breakthrough atrial tachycardia   -continue home dose of sotalol and metoprolol  -Change home lasix to torsemide 20 mg po BID, this will be absorbed better than lasix and more effective  -She would like to [facility] (I am [facility] board-certified and have extensive experience and training in congenital heart disease)  -She may eventually need a

## 2023-06-23 ENCOUNTER — TELEPHONE (OUTPATIENT)
Dept: CARDIOLOGY CLINIC | Age: 38
End: 2023-06-23

## 2023-06-23 ENCOUNTER — HOSPITAL ENCOUNTER (OUTPATIENT)
Age: 38
Discharge: HOME | End: 2023-06-23
Payer: COMMERCIAL

## 2023-06-23 DIAGNOSIS — I50.32 CHRONIC DIASTOLIC HEART FAILURE (HCC): ICD-10-CM

## 2023-06-23 DIAGNOSIS — I50.32 CHRONIC DIASTOLIC HEART FAILURE (HCC): Primary | ICD-10-CM

## 2023-06-23 LAB
ALBUMIN SERPL-MCNC: 4.4 G/DL (ref 3.4–5)
ALBUMIN/GLOB SERPL: 1.2 {RATIO} (ref 1.1–2.2)
ALP SERPL-CCNC: 64 U/L (ref 40–129)
ALT SERPL-CCNC: 23 U/L (ref 10–40)
ANION GAP SERPL CALCULATED.3IONS-SCNC: 11 MMOL/L (ref 3–16)
AST SERPL-CCNC: 16 U/L (ref 15–37)
BILIRUB SERPL-MCNC: 0.5 MG/DL (ref 0–1)
BUN SERPL-MCNC: 12 MG/DL (ref 7–20)
CALCIUM SERPL-MCNC: 10.2 MG/DL (ref 8.3–10.6)
CHLORIDE SERPL-SCNC: 100 MMOL/L (ref 99–110)
CO2 SERPL-SCNC: 27 MMOL/L (ref 21–32)
CREAT SERPL-MCNC: 0.9 MG/DL (ref 0.6–1.1)
GFR SERPLBLD CREATININE-BSD FMLA CKD-EPI: >60 ML/MIN/{1.73_M2}
GLUCOSE SERPL-MCNC: 112 MG/DL (ref 70–99)
NT-PROBNP SERPL-MCNC: <36 PG/ML (ref 0–124)
POTASSIUM SERPL-SCNC: 4.2 MMOL/L (ref 3.5–5.1)
PROT SERPL-MCNC: 8.1 G/DL (ref 6.4–8.2)
SODIUM SERPL-SCNC: 138 MMOL/L (ref 136–145)

## 2023-06-23 PROCEDURE — 80053 COMPREHEN METABOLIC PANEL: CPT

## 2023-06-23 PROCEDURE — 83880 ASSAY OF NATRIURETIC PEPTIDE: CPT

## 2023-06-23 PROCEDURE — 36415 COLL VENOUS BLD VENIPUNCTURE: CPT

## 2023-06-23 RX ORDER — METOPROLOL SUCCINATE 100 MG/1
150 TABLET, EXTENDED RELEASE ORAL DAILY
Qty: 135 TABLET | Refills: 1 | Status: SHIPPED | OUTPATIENT
Start: 2023-06-23

## 2023-06-23 NOTE — TELEPHONE ENCOUNTER
Spoke with patient. She is having symptoms similar to ER visit. Addtionally she feels jittery, leg pain and Pressure from the knees down. She has been taking both the torsemide and the furosemide. Discussed with WAK - hold both diuretics this morning. Have her come in for CMP, BNP . Discussed with patient, she verbalized understanding. Orders placed.

## 2023-06-26 ENCOUNTER — TELEPHONE (OUTPATIENT)
Dept: CARDIOLOGY CLINIC | Age: 38
End: 2023-06-26

## 2023-07-05 NOTE — PROGRESS NOTES
Patient comes in for programming evaluation for her pacemaker. Patient has [employer] [id number] [place] XT DR MRI-7/31/2019  Hx: CHB, Paroxysmal Atrial fibrillation/flutter with rapid ventricular response  Atrial tachycardia, Syncope/Pause, NSVT    All sensing and pacing parameters are within normal range. Battery life 11.1 years  AP 22.0%.  <0.1%  6 VT episodes noted. Last on 7/5/2023 longest 2 seconds. 17 Fast A&V episodes noted. Short SVT and ST noted. PVC Runs (2-4 beats) 0.9 per hour   PVC Singles 12.8 per hour   Patient remains on ASA 81 mg, metoprolol and sotalol. Alerts turned on today. Please see interrogation for more detail. Patient will see Dr. Manuela Power today and follow up in 3 months in office or remotely.

## 2023-07-06 ENCOUNTER — OFFICE VISIT (OUTPATIENT)
Dept: CARDIOLOGY CLINIC | Age: 38
End: 2023-07-06
Payer: COMMERCIAL

## 2023-07-06 ENCOUNTER — NURSE ONLY (OUTPATIENT)
Dept: CARDIOLOGY CLINIC | Age: 38
End: 2023-07-06
Payer: COMMERCIAL

## 2023-07-06 ENCOUNTER — HOSPITAL ENCOUNTER (OUTPATIENT)
Age: 38
Discharge: HOME | End: 2023-07-06
Payer: COMMERCIAL

## 2023-07-06 VITALS
WEIGHT: 293 LBS | HEART RATE: 70 BPM | DIASTOLIC BLOOD PRESSURE: 80 MMHG | BODY MASS INDEX: 43.4 KG/M2 | OXYGEN SATURATION: 97 % | HEIGHT: 69 IN | SYSTOLIC BLOOD PRESSURE: 116 MMHG

## 2023-07-06 DIAGNOSIS — I44.2 CHB (COMPLETE HEART BLOCK) (HCC): ICD-10-CM

## 2023-07-06 DIAGNOSIS — I47.1 ATRIAL TACHYCARDIA (HCC): ICD-10-CM

## 2023-07-06 DIAGNOSIS — I48.3 TYPICAL ATRIAL FLUTTER (HCC): ICD-10-CM

## 2023-07-06 DIAGNOSIS — I48.0 PAF (PAROXYSMAL ATRIAL FIBRILLATION) (HCC): Primary | ICD-10-CM

## 2023-07-06 DIAGNOSIS — Z95.0 PACEMAKER: ICD-10-CM

## 2023-07-06 DIAGNOSIS — I47.1 ATRIAL TACHYCARDIA (HCC): Primary | ICD-10-CM

## 2023-07-06 DIAGNOSIS — R06.89 DYSPNEA AND RESPIRATORY ABNORMALITIES: ICD-10-CM

## 2023-07-06 DIAGNOSIS — R06.00 DYSPNEA AND RESPIRATORY ABNORMALITIES: ICD-10-CM

## 2023-07-06 DIAGNOSIS — I48.0 PAF (PAROXYSMAL ATRIAL FIBRILLATION) (HCC): ICD-10-CM

## 2023-07-06 LAB
ANION GAP SERPL CALCULATED.3IONS-SCNC: 10 MMOL/L (ref 3–16)
BUN SERPL-MCNC: 19 MG/DL (ref 7–20)
CALCIUM SERPL-MCNC: 9.2 MG/DL (ref 8.3–10.6)
CHLORIDE SERPL-SCNC: 99 MMOL/L (ref 99–110)
CO2 SERPL-SCNC: 25 MMOL/L (ref 21–32)
CREAT SERPL-MCNC: 0.9 MG/DL (ref 0.6–1.1)
GFR SERPLBLD CREATININE-BSD FMLA CKD-EPI: >60 ML/MIN/{1.73_M2}
GLUCOSE SERPL-MCNC: 109 MG/DL (ref 70–99)
NT-PROBNP SERPL-MCNC: 150 PG/ML (ref 0–124)
POTASSIUM SERPL-SCNC: 3.7 MMOL/L (ref 3.5–5.1)
SODIUM SERPL-SCNC: 134 MMOL/L (ref 136–145)

## 2023-07-06 PROCEDURE — 99214 OFFICE O/P EST MOD 30 MIN: CPT | Performed by: INTERNAL MEDICINE

## 2023-07-06 PROCEDURE — 36415 COLL VENOUS BLD VENIPUNCTURE: CPT

## 2023-07-06 PROCEDURE — 93280 PM DEVICE PROGR EVAL DUAL: CPT | Performed by: INTERNAL MEDICINE

## 2023-07-06 PROCEDURE — 80048 BASIC METABOLIC PNL TOTAL CA: CPT

## 2023-07-06 PROCEDURE — 83880 ASSAY OF NATRIURETIC PEPTIDE: CPT

## 2023-07-17 ENCOUNTER — TELEPHONE (OUTPATIENT)
Dept: CARDIOLOGY CLINIC | Age: 38
End: 2023-07-17

## 2023-07-17 NOTE — TELEPHONE ENCOUNTER
----- Message from Kiah Archer MD sent at 7/15/2023  9:10 AM EDT -----  Her BNP was elevated.  We can try increasing torsemide to every other day

## 2023-07-17 NOTE — TELEPHONE ENCOUNTER
Call placed to [facility]. Discussed increasing torsemide to 2 times daily every other day. She had some numbing in her face and arm. Lasted for hours and then she went to sleep and woke up and had numbing in her hands again. Has not had this any further. She is going to contact her PCP and see if imaging is needed. She did not go to the ED.  If it happens again recommend she immediately go to the ED

## 2023-07-19 NOTE — PROGRESS NOTES
mitral regurgitation is present. There is no evidence of mass or thrombus in the left atrium or appendage. The left atrial size is normal.   The aortic valve is structurally normal.   Mild-to-moderate aortic regurgitation with multiple jets. Tricuspid aortic valve. The right ventricle is normal in size with mildly reduced function. Filamentous strands visualized on the pacemaker wire. Pacer / ICD wire is visualized in the right ventricle. The tricuspid valve is normal in structure and function. At least Moderate tricuspid regurgitation. PASP 38 mmHg   The pulmonic valve is not well visualized. Mild to moderate pulmonic regurgitation present. Cardiac MRI  No results found for this or any previous visit. Stress:  No results found for this or any previous visit. ISCHEMIC EVALUATION / CATH RESULTS  Cath:   No results found for this or any previous visit. Calcium score   No results found for this or any previous visit. Coronary CTA   No results found for this or any previous visit. CARDIAC RHYTHM ASSESSMENT:  Holter/Event monitor  No results found for this or any previous visit. EP studies / cardioversions   Pacemaker [facility] transmission - I personally reviewed strips with Dr. Zahira Trinidad  Very low burden of AT, overall < 0.1%, runs as fast as 150 bpm, longest only 19 sec    03/22/22 S/p ILR removal, EPS, and RFA of typical atrial flutter     8/2/19: Insertion of dual chamber pacemaker for CHB    ADDITIONAL IMAGING:   XR CHEST PORTABLE  12/13/2021 3:43 pm    COMPARISON:  02/26/2021, 12/23/2020    Reason for Exam: history of SVT    FINDINGS:  A single frontal view of the chest demonstrates no acute skeletal  abnormality. There is a stable left subclavian pacemaker in proper position. There is a cardiac event monitor overlying the lower left chest.  The patient  is status post median sternotomy. The heart size is mildly enlarged, though  stable.   The mediastinal contours are

## 2023-07-25 PROBLEM — Q24.9 CONGENITAL HEART DISEASE: Status: ACTIVE | Noted: 2023-07-25

## 2023-07-25 PROBLEM — E66.01 OBESITIES, MORBID (HCC): Status: ACTIVE | Noted: 2023-07-25

## 2023-07-25 PROBLEM — R73.03 PREDIABETES: Status: ACTIVE | Noted: 2023-07-25

## 2023-07-25 PROBLEM — I36.1 NONRHEUMATIC TRICUSPID VALVE REGURGITATION: Status: ACTIVE | Noted: 2023-07-25

## 2023-07-26 ENCOUNTER — OFFICE VISIT (OUTPATIENT)
Dept: CARDIOLOGY CLINIC | Age: 38
End: 2023-07-26
Payer: COMMERCIAL

## 2023-07-26 VITALS
OXYGEN SATURATION: 98 % | HEART RATE: 78 BPM | SYSTOLIC BLOOD PRESSURE: 118 MMHG | WEIGHT: 293 LBS | DIASTOLIC BLOOD PRESSURE: 88 MMHG | BODY MASS INDEX: 43.4 KG/M2 | HEIGHT: 69 IN

## 2023-07-26 DIAGNOSIS — I36.1 NONRHEUMATIC TRICUSPID VALVE REGURGITATION: ICD-10-CM

## 2023-07-26 DIAGNOSIS — I44.2 CHB (COMPLETE HEART BLOCK) (HCC): ICD-10-CM

## 2023-07-26 DIAGNOSIS — R06.00 DYSPNEA AND RESPIRATORY ABNORMALITIES: ICD-10-CM

## 2023-07-26 DIAGNOSIS — I47.1 ATRIAL TACHYCARDIA (HCC): ICD-10-CM

## 2023-07-26 DIAGNOSIS — I48.0 PAF (PAROXYSMAL ATRIAL FIBRILLATION) (HCC): ICD-10-CM

## 2023-07-26 DIAGNOSIS — I50.32 CHRONIC DIASTOLIC HEART FAILURE (HCC): ICD-10-CM

## 2023-07-26 DIAGNOSIS — I48.3 TYPICAL ATRIAL FLUTTER (HCC): ICD-10-CM

## 2023-07-26 DIAGNOSIS — Z95.0 PACEMAKER: ICD-10-CM

## 2023-07-26 DIAGNOSIS — R06.89 DYSPNEA AND RESPIRATORY ABNORMALITIES: ICD-10-CM

## 2023-07-26 DIAGNOSIS — R73.03 PREDIABETES: ICD-10-CM

## 2023-07-26 DIAGNOSIS — Q24.9 CONGENITAL HEART DISEASE: Primary | ICD-10-CM

## 2023-07-26 DIAGNOSIS — E66.01 OBESITIES, MORBID (HCC): ICD-10-CM

## 2023-07-26 PROCEDURE — 99214 OFFICE O/P EST MOD 30 MIN: CPT | Performed by: INTERNAL MEDICINE

## 2023-07-26 RX ORDER — TORSEMIDE 20 MG/1
20 TABLET ORAL 2 TIMES DAILY
Qty: 90 TABLET | Refills: 1 | Status: CANCELLED | OUTPATIENT
Start: 2023-07-26

## 2023-07-26 RX ORDER — TORSEMIDE 20 MG/1
20 TABLET ORAL 2 TIMES DAILY
Qty: 180 TABLET | Refills: 3 | Status: SHIPPED | OUTPATIENT
Start: 2023-07-26

## 2023-07-26 NOTE — PATIENT INSTRUCTIONS
Follow up with Dr Bard Hightower in 6 months with echo     Labs in one month     Torsemide to 20mg in  AM and then another one around 4PM. If this doesn't help with your Shortness of Breath then call the office to let us know. Talk to your PCP about Ozembic or one of the other medications like this that help with weight loss. If not we can refer you to the weight loss center. Call for any questions or concerns.

## 2023-09-13 ENCOUNTER — TELEPHONE (OUTPATIENT)
Dept: CARDIOLOGY CLINIC | Age: 38
End: 2023-09-13

## 2023-09-13 DIAGNOSIS — D50.9 MICROCYTIC ANEMIA: Primary | ICD-10-CM

## 2023-09-13 NOTE — TELEPHONE ENCOUNTER
Last OV: 7/26/23 WAKELLIE     Summary of Assessment and Plan:  7/26/23:  Increase torsemide to BID dosing  Labs- BNP, BMP in one month   NO Medication changes today - Talk to PCP about weight loss medications. If she will not prescribe we can refer her to Dr Mercedes Boyle. She was only taking her Torsemide once a day so she will   Continue risk factor modifications   Call for any new or worsening symptoms.

## 2023-09-13 NOTE — TELEPHONE ENCOUNTER
I called Natalie Harp about her remote transmission and she wanted to let Dr. Mar Zafar know she had her blood work done at [facility] yesterday.   Thanks

## 2023-09-14 NOTE — TELEPHONE ENCOUNTER
Pt notified and verbalized understanding. Pt states that she stopped Jardiance due to yeast infections  and she is still healing from that currently. She was approved for [facility] and she will be able to pick that up today. She would like to know if it is still ok for her to start this with the results of her labs? Labs queued for iron. Please advise.  Thanks

## 2023-09-17 ENCOUNTER — PATIENT MESSAGE (OUTPATIENT)
Dept: CARDIOLOGY CLINIC | Age: 38
End: 2023-09-17

## 2023-09-18 ENCOUNTER — HOSPITAL ENCOUNTER (INPATIENT)
Age: 38
LOS: 1 days | Discharge: HOME | DRG: 313 | End: 2023-09-19
Attending: EMERGENCY MEDICINE | Admitting: STUDENT IN AN ORGANIZED HEALTH CARE EDUCATION/TRAINING PROGRAM
Payer: COMMERCIAL

## 2023-09-18 ENCOUNTER — TELEPHONE (OUTPATIENT)
Dept: CARDIOLOGY CLINIC | Age: 38
End: 2023-09-18

## 2023-09-18 ENCOUNTER — APPOINTMENT (OUTPATIENT)
Dept: CT IMAGING | Age: 38
DRG: 313 | End: 2023-09-18
Payer: COMMERCIAL

## 2023-09-18 DIAGNOSIS — D50.9 IRON DEFICIENCY ANEMIA, UNSPECIFIED IRON DEFICIENCY ANEMIA TYPE: ICD-10-CM

## 2023-09-18 DIAGNOSIS — R07.89 CHEST TIGHTNESS: ICD-10-CM

## 2023-09-18 DIAGNOSIS — R06.09 DOE (DYSPNEA ON EXERTION): Primary | ICD-10-CM

## 2023-09-18 LAB
ALBUMIN SERPL-MCNC: 3.9 G/DL (ref 3.4–5)
ALBUMIN/GLOB SERPL: 1.1 {RATIO} (ref 1.1–2.2)
ALP SERPL-CCNC: 62 U/L (ref 40–129)
ALT SERPL-CCNC: 23 U/L (ref 10–40)
ANION GAP SERPL CALCULATED.3IONS-SCNC: 10 MMOL/L (ref 3–16)
APTT BLD: 27.6 SEC (ref 22.7–35.9)
AST SERPL-CCNC: 15 U/L (ref 15–37)
BASOPHILS # BLD: 0.1 K/UL (ref 0–0.2)
BASOPHILS NFR BLD: 1.1 %
BILIRUB SERPL-MCNC: 0.3 MG/DL (ref 0–1)
BUN SERPL-MCNC: 18 MG/DL (ref 7–20)
CALCIUM SERPL-MCNC: 8.9 MG/DL (ref 8.3–10.6)
CHLORIDE SERPL-SCNC: 104 MMOL/L (ref 99–110)
CO2 SERPL-SCNC: 24 MMOL/L (ref 21–32)
CREAT SERPL-MCNC: 0.8 MG/DL (ref 0.6–1.1)
DEPRECATED RDW RBC AUTO: 16.5 % (ref 12.4–15.4)
EOSINOPHIL # BLD: 0.2 K/UL (ref 0–0.6)
EOSINOPHIL NFR BLD: 3.5 %
GFR SERPLBLD CREATININE-BSD FMLA CKD-EPI: >60 ML/MIN/{1.73_M2}
GLUCOSE SERPL-MCNC: 102 MG/DL (ref 70–99)
HCG SERPL QL: NEGATIVE
HCT VFR BLD AUTO: 37.5 % (ref 36–48)
HGB BLD-MCNC: 11.9 G/DL (ref 12–16)
INR PPP: 1.01 (ref 0.84–1.16)
LYMPHOCYTES # BLD: 2 K/UL (ref 1–5.1)
LYMPHOCYTES NFR BLD: 29.8 %
MCH RBC QN AUTO: 23.1 PG (ref 26–34)
MCHC RBC AUTO-ENTMCNC: 31.8 G/DL (ref 31–36)
MCV RBC AUTO: 72.5 FL (ref 80–100)
MONOCYTES # BLD: 0.7 K/UL (ref 0–1.3)
MONOCYTES NFR BLD: 11.2 %
NEUTROPHILS # BLD: 3.6 K/UL (ref 1.7–7.7)
NEUTROPHILS NFR BLD: 54.4 %
NT-PROBNP SERPL-MCNC: 116 PG/ML (ref 0–124)
PLATELET # BLD AUTO: 325 K/UL (ref 135–450)
PMV BLD AUTO: 7.8 FL (ref 5–10.5)
POTASSIUM SERPL-SCNC: 3.9 MMOL/L (ref 3.5–5.1)
PROT SERPL-MCNC: 7.6 G/DL (ref 6.4–8.2)
PROTHROMBIN TIME: 13.3 SEC (ref 11.5–14.8)
RBC # BLD AUTO: 5.17 M/UL (ref 4–5.2)
SARS-COV-2 RDRP RESP QL NAA+PROBE: NOT DETECTED
SODIUM SERPL-SCNC: 138 MMOL/L (ref 136–145)
TROPONIN, HIGH SENSITIVITY: <6 NG/L (ref 0–14)
TROPONIN, HIGH SENSITIVITY: <6 NG/L (ref 0–14)
WBC # BLD AUTO: 6.7 K/UL (ref 4–11)

## 2023-09-18 PROCEDURE — 85730 THROMBOPLASTIN TIME PARTIAL: CPT

## 2023-09-18 PROCEDURE — 99285 EMERGENCY DEPT VISIT HI MDM: CPT

## 2023-09-18 PROCEDURE — 1200000000 HC SEMI PRIVATE

## 2023-09-18 PROCEDURE — 93005 ELECTROCARDIOGRAM TRACING: CPT | Performed by: EMERGENCY MEDICINE

## 2023-09-18 PROCEDURE — 6370000000 HC RX 637 (ALT 250 FOR IP): Performed by: STUDENT IN AN ORGANIZED HEALTH CARE EDUCATION/TRAINING PROGRAM

## 2023-09-18 PROCEDURE — G0378 HOSPITAL OBSERVATION PER HR: HCPCS

## 2023-09-18 PROCEDURE — 6370000000 HC RX 637 (ALT 250 FOR IP): Performed by: PHYSICIAN ASSISTANT

## 2023-09-18 PROCEDURE — 87635 SARS-COV-2 COVID-19 AMP PRB: CPT

## 2023-09-18 PROCEDURE — 84484 ASSAY OF TROPONIN QUANT: CPT

## 2023-09-18 PROCEDURE — 85025 COMPLETE CBC W/AUTO DIFF WBC: CPT

## 2023-09-18 PROCEDURE — 80053 COMPREHEN METABOLIC PANEL: CPT

## 2023-09-18 PROCEDURE — 71260 CT THORAX DX C+: CPT

## 2023-09-18 PROCEDURE — 84703 CHORIONIC GONADOTROPIN ASSAY: CPT

## 2023-09-18 PROCEDURE — 36415 COLL VENOUS BLD VENIPUNCTURE: CPT

## 2023-09-18 PROCEDURE — 83880 ASSAY OF NATRIURETIC PEPTIDE: CPT

## 2023-09-18 PROCEDURE — 85610 PROTHROMBIN TIME: CPT

## 2023-09-18 PROCEDURE — 6360000004 HC RX CONTRAST MEDICATION: Performed by: EMERGENCY MEDICINE

## 2023-09-18 RX ORDER — ACETAMINOPHEN 500 MG
1000 TABLET ORAL ONCE
Status: COMPLETED | OUTPATIENT
Start: 2023-09-18 | End: 2023-09-18

## 2023-09-18 RX ORDER — INSULIN LISPRO 100 [IU]/ML
0-8 INJECTION, SOLUTION INTRAVENOUS; SUBCUTANEOUS
Status: DISCONTINUED | OUTPATIENT
Start: 2023-09-19 | End: 2023-09-19 | Stop reason: HOSPADM

## 2023-09-18 RX ORDER — SOTALOL HYDROCHLORIDE 80 MG/1
160 TABLET ORAL 2 TIMES DAILY
Status: DISCONTINUED | OUTPATIENT
Start: 2023-09-18 | End: 2023-09-19 | Stop reason: HOSPADM

## 2023-09-18 RX ORDER — HYDROCODONE BITARTRATE AND ACETAMINOPHEN 5; 325 MG/1; MG/1
1 TABLET ORAL
Status: DISCONTINUED | OUTPATIENT
Start: 2023-09-18 | End: 2023-09-18

## 2023-09-18 RX ORDER — ASPIRIN 81 MG/1
81 TABLET ORAL DAILY
Status: DISCONTINUED | OUTPATIENT
Start: 2023-09-19 | End: 2023-09-19 | Stop reason: HOSPADM

## 2023-09-18 RX ORDER — SODIUM CHLORIDE 9 MG/ML
5-250 INJECTION, SOLUTION INTRAVENOUS PRN
OUTPATIENT
Start: 2023-09-18

## 2023-09-18 RX ORDER — HEPARIN SODIUM 5000 [USP'U]/ML
5000 INJECTION, SOLUTION INTRAVENOUS; SUBCUTANEOUS EVERY 8 HOURS SCHEDULED
Status: DISCONTINUED | OUTPATIENT
Start: 2023-09-19 | End: 2023-09-19 | Stop reason: HOSPADM

## 2023-09-18 RX ORDER — ONDANSETRON 4 MG/1
4 TABLET, ORALLY DISINTEGRATING ORAL EVERY 8 HOURS PRN
Status: DISCONTINUED | OUTPATIENT
Start: 2023-09-18 | End: 2023-09-19 | Stop reason: HOSPADM

## 2023-09-18 RX ORDER — ALBUTEROL SULFATE 90 UG/1
2 AEROSOL, METERED RESPIRATORY (INHALATION) EVERY 6 HOURS PRN
Status: DISCONTINUED | OUTPATIENT
Start: 2023-09-18 | End: 2023-09-19 | Stop reason: HOSPADM

## 2023-09-18 RX ORDER — SODIUM CHLORIDE 0.9 % (FLUSH) 0.9 %
5-40 SYRINGE (ML) INJECTION EVERY 12 HOURS SCHEDULED
Status: DISCONTINUED | OUTPATIENT
Start: 2023-09-18 | End: 2023-09-19 | Stop reason: HOSPADM

## 2023-09-18 RX ORDER — SODIUM CHLORIDE 9 MG/ML
INJECTION, SOLUTION INTRAVENOUS PRN
Status: DISCONTINUED | OUTPATIENT
Start: 2023-09-18 | End: 2023-09-19 | Stop reason: HOSPADM

## 2023-09-18 RX ORDER — SODIUM CHLORIDE 0.9 % (FLUSH) 0.9 %
5-40 SYRINGE (ML) INJECTION PRN
Status: DISCONTINUED | OUTPATIENT
Start: 2023-09-18 | End: 2023-09-19 | Stop reason: HOSPADM

## 2023-09-18 RX ORDER — SODIUM CHLORIDE 0.9 % (FLUSH) 0.9 %
5-40 SYRINGE (ML) INJECTION PRN
OUTPATIENT
Start: 2023-09-18

## 2023-09-18 RX ORDER — TORSEMIDE 20 MG/1
20 TABLET ORAL 2 TIMES DAILY
Status: DISCONTINUED | OUTPATIENT
Start: 2023-09-18 | End: 2023-09-19 | Stop reason: HOSPADM

## 2023-09-18 RX ORDER — INSULIN LISPRO 100 [IU]/ML
0-4 INJECTION, SOLUTION INTRAVENOUS; SUBCUTANEOUS NIGHTLY
Status: DISCONTINUED | OUTPATIENT
Start: 2023-09-18 | End: 2023-09-19 | Stop reason: HOSPADM

## 2023-09-18 RX ORDER — GLUCAGON 1 MG/ML
1 KIT INJECTION PRN
Status: DISCONTINUED | OUTPATIENT
Start: 2023-09-18 | End: 2023-09-19 | Stop reason: HOSPADM

## 2023-09-18 RX ORDER — ACETAMINOPHEN 650 MG/1
650 SUPPOSITORY RECTAL EVERY 6 HOURS PRN
Status: DISCONTINUED | OUTPATIENT
Start: 2023-09-18 | End: 2023-09-19 | Stop reason: HOSPADM

## 2023-09-18 RX ORDER — ONDANSETRON 2 MG/ML
4 INJECTION INTRAMUSCULAR; INTRAVENOUS EVERY 6 HOURS PRN
Status: DISCONTINUED | OUTPATIENT
Start: 2023-09-18 | End: 2023-09-19 | Stop reason: HOSPADM

## 2023-09-18 RX ORDER — DEXTROSE MONOHYDRATE 100 MG/ML
INJECTION, SOLUTION INTRAVENOUS CONTINUOUS PRN
Status: DISCONTINUED | OUTPATIENT
Start: 2023-09-18 | End: 2023-09-19 | Stop reason: HOSPADM

## 2023-09-18 RX ORDER — ACETAMINOPHEN 325 MG/1
650 TABLET ORAL EVERY 6 HOURS PRN
Status: DISCONTINUED | OUTPATIENT
Start: 2023-09-18 | End: 2023-09-19 | Stop reason: HOSPADM

## 2023-09-18 RX ORDER — PANTOPRAZOLE SODIUM 40 MG/1
40 TABLET, DELAYED RELEASE ORAL
Status: DISCONTINUED | OUTPATIENT
Start: 2023-09-19 | End: 2023-09-19 | Stop reason: HOSPADM

## 2023-09-18 RX ORDER — POLYETHYLENE GLYCOL 3350 17 G/17G
17 POWDER, FOR SOLUTION ORAL DAILY PRN
Status: DISCONTINUED | OUTPATIENT
Start: 2023-09-18 | End: 2023-09-19 | Stop reason: HOSPADM

## 2023-09-18 RX ORDER — METOPROLOL SUCCINATE 50 MG/1
150 TABLET, EXTENDED RELEASE ORAL DAILY
Status: DISCONTINUED | OUTPATIENT
Start: 2023-09-19 | End: 2023-09-19 | Stop reason: HOSPADM

## 2023-09-18 RX ORDER — TOPIRAMATE 100 MG/1
100 TABLET, FILM COATED ORAL 2 TIMES DAILY
Status: DISCONTINUED | OUTPATIENT
Start: 2023-09-18 | End: 2023-09-19 | Stop reason: HOSPADM

## 2023-09-18 RX ADMIN — IOPAMIDOL 75 ML: 755 INJECTION, SOLUTION INTRAVENOUS at 19:00

## 2023-09-18 RX ADMIN — TOPIRAMATE 100 MG: 100 TABLET, FILM COATED ORAL at 23:59

## 2023-09-18 RX ADMIN — SOTALOL HYDROCHLORIDE 160 MG: 80 TABLET ORAL at 23:59

## 2023-09-18 RX ADMIN — TORSEMIDE 20 MG: 20 TABLET ORAL at 23:58

## 2023-09-18 RX ADMIN — ACETAMINOPHEN 1000 MG: 500 TABLET ORAL at 20:02

## 2023-09-18 ASSESSMENT — ENCOUNTER SYMPTOMS
CHEST TIGHTNESS: 1
APNEA: 0
CONSTIPATION: 0
BACK PAIN: 0
ABDOMINAL PAIN: 0
STRIDOR: 0
SHORTNESS OF BREATH: 1
WHEEZING: 0
DIARRHEA: 0
COUGH: 0
PHOTOPHOBIA: 0
CHOKING: 0
COLOR CHANGE: 0
NAUSEA: 1
VOMITING: 0

## 2023-09-18 ASSESSMENT — PATIENT HEALTH QUESTIONNAIRE - PHQ9
SUM OF ALL RESPONSES TO PHQ QUESTIONS 1-9: 0
SUM OF ALL RESPONSES TO PHQ9 QUESTIONS 1 & 2: 0
2. FEELING DOWN, DEPRESSED OR HOPELESS: 0
1. LITTLE INTEREST OR PLEASURE IN DOING THINGS: 0
SUM OF ALL RESPONSES TO PHQ QUESTIONS 1-9: 0

## 2023-09-18 ASSESSMENT — PAIN - FUNCTIONAL ASSESSMENT: PAIN_FUNCTIONAL_ASSESSMENT: 0-10

## 2023-09-18 ASSESSMENT — PAIN DESCRIPTION - LOCATION: LOCATION: CHEST;LEG;HEAD

## 2023-09-18 ASSESSMENT — PAIN SCALES - GENERAL
PAINLEVEL_OUTOF10: 10
PAINLEVEL_OUTOF10: 5

## 2023-09-18 ASSESSMENT — LIFESTYLE VARIABLES: HOW OFTEN DO YOU HAVE A DRINK CONTAINING ALCOHOL: NEVER

## 2023-09-18 NOTE — TELEPHONE ENCOUNTER
PT having SOB, chest pain, dizziness, laid down and had sensations of drowning. PT stated she has three knots on the back of her leg that she noticed last night. Please advise.

## 2023-09-18 NOTE — ED PROVIDER NOTES
General:         Right eye: No discharge. Left eye: No discharge. Extraocular Movements: Extraocular movements intact. Conjunctiva/sclera: Conjunctivae normal.      Pupils: Pupils are equal, round, and reactive to light. Cardiovascular:      Rate and Rhythm: Normal rate and regular rhythm. Pulses: Normal pulses. Heart sounds: Normal heart sounds. No murmur heard. No friction rub. No gallop. Comments: 2+ radial pulses bilaterally. No pedal edema. No JVD. Does have some asymmetric swelling to the left lower extremity with comparison to the right. To the left lateral posterior calf there are 3 nodules palpated that are tender to palpation. No erythema or warmth. No induration or fluctuance. No crepitus. Negative Homans' sign bilaterally. Dorsalis pedis pulse and capillary refill brisk bilaterally. No pulse deficit. Pulmonary:      Effort: Pulmonary effort is normal. No respiratory distress. Breath sounds: Normal breath sounds. No stridor. No wheezing, rhonchi or rales. Comments: Conversational dyspnea noted. Chest:      Chest wall: No tenderness. Abdominal:      General: Abdomen is flat. Bowel sounds are normal. There is no distension. Palpations: Abdomen is soft. There is no mass. Tenderness: There is no abdominal tenderness. There is no right CVA tenderness, left CVA tenderness, guarding or rebound. Hernia: No hernia is present. Musculoskeletal:         General: Normal range of motion. Cervical back: Normal range of motion and neck supple. No rigidity or tenderness. Lymphadenopathy:      Cervical: No cervical adenopathy. Skin:     General: Skin is warm and dry. Coloration: Skin is not pale. Findings: No erythema or rash. Neurological:      General: No focal deficit present. Mental Status: She is alert and oriented to person, place, and time. GCS: GCS eye subscore is 4. GCS verbal subscore is 5.  GCS motor arch hypoplasia. EMERGENCY DEPARTMENT COURSE and DIFFERENTIAL DIAGNOSIS/MDM:   Vitals:    Vitals:    09/18/23 2144 09/18/23 2145 09/18/23 2146 09/18/23 2221   BP:  93/80  115/82   Pulse: 65 69 65 64   Resp: 21      Temp:       TempSrc:       SpO2: 99%  96%    Weight:       Height:           Patient was given the following medications:  Medications   iopamidol (ISOVUE-370) 76 % injection 75 mL (75 mLs IntraVENous Given 9/18/23 1900)   acetaminophen (TYLENOL) tablet 1,000 mg (1,000 mg Oral Given 9/18/23 2002)             Is this patient to be included in the SEP-1 Core Measure due to severe sepsis or septic shock? No   Exclusion criteria - the patient is NOT to be included for SEP-1 Core Measure due to: Infection is not suspected    Chronic Conditions affecting care:    has a past medical history of Asthma, CHF (congestive heart failure) ([address]), Congenital heart defect, Migraines, basilar, Palpitations, Sciatica, and VSD (ventricular septal defect and aortic arch hypoplasia. CONSULTS: (Who and What was discussed)  None      Social Determinants Significantly Affecting Health : None    Records Reviewed (External and Source) CARDS Notes    CC/HPI Summary, DDx, ED Course, and Reassessment: Patient is a 60-year-old female who presents ED with complaint of shortness of breath on exertion associated chest tightness. History of congenital heart disease with surgery on her heart in the past.  Currently following up with cardiologist here at [facility], Dr. Gerald Aguirre. Came to the ED today for chest tightness, lightheadedness, feelings of near syncope and shortness of breath especially with exertion. Upon examination she is afebrile stable vital signs but she does appear conversationally dyspneic. IV was established and blood work obtained. Troponin less than 6x2. EKG obtained and interpreted by attending. CBC showed normal white count and platelets. Hemoglobin 11.9. CMP unremarkable. .   Pregnancy was

## 2023-09-18 NOTE — TELEPHONE ENCOUNTER
Pt called she still has knot in left leg, dizzy, sob and some sharp chest pain on her left side. Please advise.

## 2023-09-18 NOTE — TELEPHONE ENCOUNTER
Per verbal conversation with Jerome Kennedy patient to check on her symptoms and how she is feeling currently. She reports that she started feeling bad Saturday. Since yesterday she has had 3 knots in back of left leg that are painful, pain goes up the leg, along with pressure and heaviness in the leg, She is SOB especially laying down, Chest pain, a headache that is slight, nausea, lightheaded, feels \"weird\", feels \"like she is walking up a hill\",  sounds like she is wheezing when talking to writer. Pt denies history of blood clots, denies edema in legs and only knots. Pt advised to come to ED now to be evaluated. Pt states mother is on her way to get her and they will head to [place] when she arrives.

## 2023-09-19 VITALS
HEART RATE: 63 BPM | SYSTOLIC BLOOD PRESSURE: 116 MMHG | RESPIRATION RATE: 18 BRPM | HEIGHT: 69 IN | BODY MASS INDEX: 43.4 KG/M2 | WEIGHT: 293 LBS | TEMPERATURE: 97.9 F | DIASTOLIC BLOOD PRESSURE: 78 MMHG | OXYGEN SATURATION: 98 %

## 2023-09-19 LAB
ANION GAP SERPL CALCULATED.3IONS-SCNC: 12 MMOL/L (ref 3–16)
BASOPHILS # BLD: 0.1 K/UL (ref 0–0.2)
BASOPHILS NFR BLD: 1 %
BUN SERPL-MCNC: 14 MG/DL (ref 7–20)
CALCIUM SERPL-MCNC: 8.7 MG/DL (ref 8.3–10.6)
CHLORIDE SERPL-SCNC: 106 MMOL/L (ref 99–110)
CHOLEST SERPL-MCNC: 163 MG/DL (ref 0–199)
CO2 SERPL-SCNC: 21 MMOL/L (ref 21–32)
CREAT SERPL-MCNC: 0.7 MG/DL (ref 0.6–1.1)
CRP SERPL-MCNC: 22.9 MG/L (ref 0–5.1)
DEPRECATED RDW RBC AUTO: 16.4 % (ref 12.4–15.4)
EKG ATRIAL RATE: 74 BPM
EKG DIAGNOSIS: NORMAL
EKG P AXIS: 35 DEGREES
EKG P-R INTERVAL: 130 MS
EKG Q-T INTERVAL: 438 MS
EKG QRS DURATION: 146 MS
EKG QTC CALCULATION (BAZETT): 486 MS
EKG R AXIS: -7 DEGREES
EKG T AXIS: 40 DEGREES
EKG VENTRICULAR RATE: 74 BPM
EOSINOPHIL # BLD: 0.3 K/UL (ref 0–0.6)
EOSINOPHIL NFR BLD: 3.6 %
ERYTHROCYTE [SEDIMENTATION RATE] IN BLOOD BY WESTERGREN METHOD: 52 MM/HR (ref 0–20)
EST. AVERAGE GLUCOSE BLD GHB EST-MCNC: 125.5 MG/DL
FERRITIN SERPL IA-MCNC: 67.3 NG/ML (ref 15–150)
GFR SERPLBLD CREATININE-BSD FMLA CKD-EPI: >60 ML/MIN/{1.73_M2}
GLUCOSE SERPL-MCNC: 138 MG/DL (ref 70–99)
HBA1C MFR BLD: 6 %
HCT VFR BLD AUTO: 35.9 % (ref 36–48)
HDLC SERPL-MCNC: 30 MG/DL (ref 40–60)
HGB BLD-MCNC: 11.7 G/DL (ref 12–16)
IRON SATN MFR SERPL: 18 % (ref 15–50)
IRON SERPL-MCNC: 64 UG/DL (ref 37–145)
LDL CHOLESTEROL CALCULATED: 88 MG/DL
LYMPHOCYTES # BLD: 2.3 K/UL (ref 1–5.1)
LYMPHOCYTES NFR BLD: 30.9 %
MCH RBC QN AUTO: 23.4 PG (ref 26–34)
MCHC RBC AUTO-ENTMCNC: 32.6 G/DL (ref 31–36)
MCV RBC AUTO: 71.7 FL (ref 80–100)
MONOCYTES # BLD: 0.8 K/UL (ref 0–1.3)
MONOCYTES NFR BLD: 10.7 %
NEUTROPHILS # BLD: 4 K/UL (ref 1.7–7.7)
NEUTROPHILS NFR BLD: 53.8 %
PLATELET # BLD AUTO: 310 K/UL (ref 135–450)
PMV BLD AUTO: 7.9 FL (ref 5–10.5)
POTASSIUM SERPL-SCNC: 3.8 MMOL/L (ref 3.5–5.1)
RBC # BLD AUTO: 5.01 M/UL (ref 4–5.2)
SODIUM SERPL-SCNC: 139 MMOL/L (ref 136–145)
TIBC SERPL-MCNC: 358 UG/DL (ref 260–445)
TRIGL SERPL-MCNC: 224 MG/DL (ref 0–150)
TROPONIN, HIGH SENSITIVITY: <6 NG/L (ref 0–14)
VLDLC SERPL CALC-MCNC: 45 MG/DL
WBC # BLD AUTO: 7.4 K/UL (ref 4–11)

## 2023-09-19 PROCEDURE — 84484 ASSAY OF TROPONIN QUANT: CPT

## 2023-09-19 PROCEDURE — 86140 C-REACTIVE PROTEIN: CPT

## 2023-09-19 PROCEDURE — 2580000003 HC RX 258: Performed by: NURSE PRACTITIONER

## 2023-09-19 PROCEDURE — 83036 HEMOGLOBIN GLYCOSYLATED A1C: CPT

## 2023-09-19 PROCEDURE — 2580000003 HC RX 258: Performed by: STUDENT IN AN ORGANIZED HEALTH CARE EDUCATION/TRAINING PROGRAM

## 2023-09-19 PROCEDURE — G0378 HOSPITAL OBSERVATION PER HR: HCPCS

## 2023-09-19 PROCEDURE — 6360000002 HC RX W HCPCS: Performed by: NURSE PRACTITIONER

## 2023-09-19 PROCEDURE — 83540 ASSAY OF IRON: CPT

## 2023-09-19 PROCEDURE — 94760 N-INVAS EAR/PLS OXIMETRY 1: CPT

## 2023-09-19 PROCEDURE — 80061 LIPID PANEL: CPT

## 2023-09-19 PROCEDURE — 93010 ELECTROCARDIOGRAM REPORT: CPT | Performed by: INTERNAL MEDICINE

## 2023-09-19 PROCEDURE — 99223 1ST HOSP IP/OBS HIGH 75: CPT | Performed by: INTERNAL MEDICINE

## 2023-09-19 PROCEDURE — 80048 BASIC METABOLIC PNL TOTAL CA: CPT

## 2023-09-19 PROCEDURE — 85025 COMPLETE CBC W/AUTO DIFF WBC: CPT

## 2023-09-19 PROCEDURE — 82728 ASSAY OF FERRITIN: CPT

## 2023-09-19 PROCEDURE — 6360000004 HC RX CONTRAST MEDICATION: Performed by: INTERNAL MEDICINE

## 2023-09-19 PROCEDURE — C8929 TTE W OR WO FOL WCON,DOPPLER: HCPCS

## 2023-09-19 PROCEDURE — 96365 THER/PROPH/DIAG IV INF INIT: CPT

## 2023-09-19 PROCEDURE — 6370000000 HC RX 637 (ALT 250 FOR IP): Performed by: STUDENT IN AN ORGANIZED HEALTH CARE EDUCATION/TRAINING PROGRAM

## 2023-09-19 PROCEDURE — 83550 IRON BINDING TEST: CPT

## 2023-09-19 PROCEDURE — 93971 EXTREMITY STUDY: CPT

## 2023-09-19 PROCEDURE — 36415 COLL VENOUS BLD VENIPUNCTURE: CPT

## 2023-09-19 PROCEDURE — 85652 RBC SED RATE AUTOMATED: CPT

## 2023-09-19 RX ORDER — MONTELUKAST SODIUM 10 MG/1
10 TABLET ORAL DAILY
Qty: 30 TABLET | Refills: 0 | Status: SHIPPED | OUTPATIENT
Start: 2023-09-19

## 2023-09-19 RX ORDER — KETOROLAC TROMETHAMINE 30 MG/ML
30 INJECTION, SOLUTION INTRAMUSCULAR; INTRAVENOUS EVERY 6 HOURS PRN
Status: DISCONTINUED | OUTPATIENT
Start: 2023-09-19 | End: 2023-09-19 | Stop reason: HOSPADM

## 2023-09-19 RX ORDER — ATORVASTATIN CALCIUM 40 MG/1
40 TABLET, FILM COATED ORAL NIGHTLY
Status: DISCONTINUED | OUTPATIENT
Start: 2023-09-19 | End: 2023-09-19 | Stop reason: HOSPADM

## 2023-09-19 RX ORDER — LORATADINE 10 MG/1
10 TABLET ORAL DAILY
Qty: 30 TABLET | Refills: 0 | Status: SHIPPED | OUTPATIENT
Start: 2023-09-19

## 2023-09-19 RX ADMIN — TORSEMIDE 20 MG: 20 TABLET ORAL at 08:26

## 2023-09-19 RX ADMIN — IRON SUCROSE 200 MG: 20 INJECTION, SOLUTION INTRAVENOUS at 11:44

## 2023-09-19 RX ADMIN — SODIUM CHLORIDE, PRESERVATIVE FREE 10 ML: 5 INJECTION INTRAVENOUS at 08:26

## 2023-09-19 RX ADMIN — ASPIRIN 81 MG: 81 TABLET, COATED ORAL at 08:26

## 2023-09-19 RX ADMIN — SOTALOL HYDROCHLORIDE 160 MG: 80 TABLET ORAL at 08:25

## 2023-09-19 RX ADMIN — METOPROLOL SUCCINATE 150 MG: 50 TABLET, EXTENDED RELEASE ORAL at 08:25

## 2023-09-19 RX ADMIN — TOPIRAMATE 100 MG: 100 TABLET, FILM COATED ORAL at 08:26

## 2023-09-19 RX ADMIN — PERFLUTREN 1.5 ML: 6.52 INJECTION, SUSPENSION INTRAVENOUS at 17:01

## 2023-09-19 ASSESSMENT — PAIN SCALES - GENERAL: PAINLEVEL_OUTOF10: 0

## 2023-09-19 NOTE — CONSULTS
Cardiology consult   Patient well known to me - she called our office yesterday with symptoms concerning for acute PE so we referred her to ER  CTPE negative  chart reviewed     Will see patient early afternoon  Full note to follow       For now    -She has long-standing musculoskeletal chest pain  -She has prediabetes and morbid obesity, may have progressed to full diabetes  -She has mild anemia with severe iron deficiency out of proportion to degree of anemia, in light of her diastolic heart failure IV iron supplementation is recommended  -She is overdo for her annual surveillance echo for her CHD  -She just had PPM interrogation 9/14 - 10 years of battery left. Minimal events    EVENT SUMMARY  Highlighted below are 2 of 46 total events, as reported by the device. [ VT(1), SVT(1) ] are used to  support the following Impressions. TYPE DATE DURATION A/V RATE  VT 09/13/23 3:14 AM 1s 74/167bpm  SVT 09/10/23 1:18 PM 21s 150/150bpm    -SVT/VT are nonsustained and extremely short (on her stable doses of sotalol and metoprolol). Will get echo, inflammatory markers  Ok to give NSAIDs for pain if needed  Very unlikely that she has underlying CAD  I will ask EP if there is anything else to add/change while she is here.   Overall given limited breakthrough of VT/SVT on medication, likely no need for any changes    Please give IV iron infusion while she is here, we can finish the rest as an outpatient  Continue home dose of diuretics    Will review echo and examine patient this pm    Known to have significant chronic disease of both pulmonary and tricuspid valve, so far no indication for intervention (may be several years until she needs anything else done)    Biggest threat to her health is her morbid obesity    I ordered  A1C, lipids, ESR, CRP, full echo

## 2023-09-19 NOTE — CONSULTS
[address]  [phone]      Chief Complaint   Patient presents with    Chest Pain     Chest pain, sob started Saturday. Light headed. Left leg pain/knot on back. Painful to walk on it. Headache, nausea, and feeling tired        Reason for consult:  complex congenital heart disease    ASSESSMENT AND PLAN:  SOB/wheezing - suspect this is due to a primary pulmonary cause  CP - no concerning cardiac cause has been found, pain has resolved  SVT/VT - very short episodes, stable on current antiarrhythmic therapy - continue sotalol at current dose, no change in home meds  Knot in leg - on my exam this appears to be a spasm of her left gastrocnemius muscle. NSAIDs, massage, stretching, no evidence of DVT/PE  Mildly elevated CRP - nonspecific, no other signs or symptoms concerning for pericarditis  DORV / [place] / TR - echo shows stable findings  S/p PPM - already had an interrogation this week which was reassuring    Ok to discharge home  Recommend Rx for albuterol and nonsedating H2 blocker such as Zyrtec  F/u with PCP and/or pulmonary    No need for routing congenital heart disease f/u for 6 months unless new issues      History of Present Illness:  Blake Thomson is a 45 y.o. patient  well-known to me. 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 Joycelyn Cardona. She called our office yesterday with concerns of severe dyspnea and a knot in her left leg. She was told to go to the ED to r/o PE. This was ruled out. She was admitted for further evaluation.      Past Medical History:   has a past medical history of Asthma, CHF (congestive heart failure) ([address]), Congenital heart defect, Migraines, basilar, Palpitations, Sciatica, and VSD (ventricular septal defect and aortic arch

## 2023-09-19 NOTE — PROGRESS NOTES
[facility]    9/19/2023 8:18 AM        Name: Kehinde Berrios . Admitted: 9/18/2023  Primary Care Provider: Manny Raymond MD (Tel: [phone])      Chief complaint: 44 yo female with hx congenital heart disease, PPM. Presented to ER with multiple complaints: worsening chest pain, shortness of breath, lightheadedness, left lower leg pain. Admitted to r/o ACS. Subjective:  Resting in bed. States symptoms continue but feels a little better compared to presentation.      Reviewed interval ancillary notes    Current Medications  atorvastatin (LIPITOR) tablet 40 mg, Nightly  iron sucrose (VENOFER) 200 mg in sodium chloride 0.9 % 100 mL IVPB, Q24H  perflutren lipid microspheres (DEFINITY) injection 1.5 mL, ONCE PRN  albuterol sulfate HFA (PROVENTIL;VENTOLIN;PROAIR) 108 (90 Base) MCG/ACT inhaler 2 puff, Q6H PRN  aspirin EC tablet 81 mg, Daily  metoprolol succinate (TOPROL XL) extended release tablet 150 mg, Daily  pantoprazole (PROTONIX) tablet 40 mg, QAM AC  topiramate (TOPAMAX) tablet 100 mg, BID  torsemide (DEMADEX) tablet 20 mg, BID  sodium chloride flush 0.9 % injection 5-40 mL, 2 times per day  sodium chloride flush 0.9 % injection 5-40 mL, PRN  0.9 % sodium chloride infusion, PRN  ondansetron (ZOFRAN-ODT) disintegrating tablet 4 mg, Q8H PRN   Or  ondansetron (ZOFRAN) injection 4 mg, Q6H PRN  polyethylene glycol (GLYCOLAX) packet 17 g, Daily PRN  acetaminophen (TYLENOL) tablet 650 mg, Q6H PRN   Or  acetaminophen (TYLENOL) suppository 650 mg, Q6H PRN  heparin (porcine) injection 5,000 Units, 3 times per day  insulin lispro (HUMALOG) injection vial 0-8 Units, TID WC  insulin lispro (HUMALOG) injection vial 0-4 Units, Nightly  dextrose bolus 10% 125 mL, PRN   Or  dextrose bolus 10% 250 mL, PRN  glucagon injection 1 mg, PRN  dextrose 10 % infusion, Continuous PRN  sotalol (BETAPACE) tablet 160 mg,

## 2023-09-19 NOTE — TELEPHONE ENCOUNTER
Pt notified and verbalized understanding. quadrant/pelvis with small amount of pneumoperitoneum likely related to procedure. Hepatic cirrhosis with splenomegaly and portal venous hypertension with venous varices and large amount of ascites. Progressive widespread metastatic malignancy with progressive widespread pulmonary metastasis, progressive necrotic mass lesion in the right hepatic lobe and peritoneal carcinomatosis with the extensive mesenteric and pelvic necrotic masses and omental caking. There may be malignant ascites. There may be tumor invasion into the right portal vein. Partially distended gallbladder with gallstones and wall thickening which could be either due to cholecystitis versus ascites. Ir Guided Tunneled Intraperitoneal Cath W Or Wo Contrast Perc    Result Date: 2021  NUMBER OF IMAGES: Patient MRN:  [id number] : 1955 Age: 77 years Gender: Male Order Date:  2021 11:37 AM EXAM: IR GUIDED TUNNELED INTRAPERITONEAL CATH W OR WO CONTRAST PERC NUMBER OF IMAGES:  4 INDICATION: C22.0 Hepatocellular carcinoma ([address] placement of plexus catheter for recurrent ascites What reading provider will be dictating this exam?->[facility] COMPARISON: None Insertion of a permanent peritoneal drainage catheter with tunnel creation: After obtaining informed consent and following the routine sterile prep and drape a combination of ultrasound and fluoroscopy were utilized to place a needle into the peritoneal cavity and ascitic fluid was obtained. Subsequently after the administration of local anesthesia a tunnel tract was created and dilated. Two separate incisions were made. Routine ultrasound and Doppler ultrasound were used. Using direct real-time ultrasound imaging peritoneal access was obtained. An image was stored and copy was transmitted to PACS. Subsequently with real-time guidance a needle was inserted intravascular and through the needle a wire.  The needle tip was visualized with real time guidance as it was placed into the peritoneal

## 2023-09-19 NOTE — PLAN OF CARE
Problem: Discharge Planning  Goal: Discharge to home or other facility with appropriate resources  Outcome: Progressing     Problem: Pain  Goal: Verbalizes/displays adequate comfort level or baseline comfort level  Outcome: Progressing     Problem: Safety - Adult  Goal: Free from fall injury  Outcome: Progressing     Problem: Respiratory - Adult  Goal: Achieves optimal ventilation and oxygenation  Outcome: Progressing     Problem: Cardiovascular - Adult  Goal: Maintains optimal cardiac output and hemodynamic stability  Outcome: Progressing  Goal: Absence of cardiac dysrhythmias or at baseline  Outcome: Progressing     Problem: Gastrointestinal - Adult  Goal: Minimal or absence of nausea and vomiting  Outcome: Progressing     Problem: Genitourinary - Adult  Goal: Absence of urinary retention  Outcome: Progressing     Problem: Infection - Adult  Goal: Absence of infection at discharge  Outcome: Progressing     Problem: Metabolic/Fluid and Electrolytes - Adult  Goal: Electrolytes maintained within normal limits  Outcome: Progressing  Goal: Hemodynamic stability and optimal renal function maintained  Outcome: Progressing     Problem: Hematologic - Adult  Goal: Maintains hematologic stability  Outcome: Progressing

## 2023-09-19 NOTE — PLAN OF CARE
Problem: Discharge Planning  Goal: Discharge to home or other facility with appropriate resources  9/19/2023 0922 by Cristal Thomas RN  Outcome: Progressing  Flowsheets (Taken 9/19/2023 0820)  Discharge to home or other facility with appropriate resources: Identify barriers to discharge with patient and caregiver     Problem: Pain  Goal: Verbalizes/displays adequate comfort level or baseline comfort level  9/19/2023 0922 by Cristal Thomas RN  Outcome: Progressing     Problem: Safety - Adult  Goal: Free from fall injury  9/19/2023 0922 by Cristal Thomas RN  Outcome: Progressing     Problem: Respiratory - Adult  Goal: Achieves optimal ventilation and oxygenation  9/19/2023 0922 by Cristal Thomas RN  Outcome: Progressing     Problem: Cardiovascular - Adult  Goal: Maintains optimal cardiac output and hemodynamic stability  9/19/2023 0922 by Cristal Thomas RN  Outcome: Progressing     Problem: Cardiovascular - Adult  Goal: Absence of cardiac dysrhythmias or at baseline  9/19/2023 0922 by Cristal Thomas RN  Outcome: Progressing     Problem: Gastrointestinal - Adult  Goal: Minimal or absence of nausea and vomiting  9/19/2023 0922 by Cristal Thomas RN  Outcome: Progressing     Problem: Genitourinary - Adult  Goal: Absence of urinary retention  9/19/2023 0922 by Cristal Thomas RN  Outcome: Progressing     Problem: Infection - Adult  Goal: Absence of infection at discharge  9/19/2023 0922 by Cristal Thomas RN  Outcome: Progressing  Flowsheets (Taken 9/19/2023 0820)  Absence of infection at discharge: Assess and monitor for signs and symptoms of infection     Problem: Metabolic/Fluid and Electrolytes - Adult  Goal: Electrolytes maintained within normal limits  9/19/2023 0922 by Cristal Thomas RN  Outcome: Progressing  Flowsheets (Taken 9/19/2023 0820)  Electrolytes maintained within normal limits: Monitor labs and assess patient for signs and symptoms of electrolyte imbalances     Problem: Hematologic - Adult  Goal:

## 2023-09-19 NOTE — PROGRESS NOTES
Data- discharge order received, pt verbalized agreement to discharge, disposition to previous residence, no needs for HHC/DME. Action- discharge instructions prepared and given to pt, pt verbalized understanding. Medication information packet given r/t NEW and/or CHANGED prescriptions emphasizing name/purpose/side effects, pt verbalized understanding. Discharge instruction summary: Diet- regular, Activity- as tolerated, Primary Care Physician as follows: Sharon Fair -[phone] f/u appointment in 1 week or as needed, f/up with Dr. Ness Vega in 6 months, f/up with Pulmonologist. immunizations reviewed, prescription medications filled [address], [address] [place] [address]. 1. WEIGHT: Admit Weight - Scale: (!) 323 lb (146.5 kg) (09/18/23 1725)        Today  Weight - Scale: (!) 323 lb (146.5 kg) (09/19/23 0155)       2. O2 SAT.: SpO2: 98 % (09/19/23 1645)    Response- Pt belongings gathered, IV removed. Disposition is home (no HHC/DME needs), transported with family, taken to lobby via w/c w/ RN, no complications.

## 2023-09-19 NOTE — ED NOTES
ED TO INPATIENT SBAR HANDOFF    Patient Name: Lana Tatum   :  1985  45 y.o. MRN:  [id number]  Preferred Name  [address] [place] [address]  ED Room #:  ED-0008/08  Family/Caregiver Present no   Restraints no   Sitter no   Sepsis Risk Score Sepsis Risk Score: 0.48    Situation  Code Status: Prior No additional code details. Allergies: Caffeine, Red dye, and Other  Weight: Patient Vitals for the past 96 hrs (Last 3 readings):   Weight   23 1725 (!) 323 lb (146.5 kg)     Arrived from: home  Chief Complaint:   Chief Complaint   Patient presents with    Chest Pain     Chest pain, sob started Saturday. Light headed. Left leg pain/knot on back. Painful to walk on it. Headache, nausea, and feeling tired     Hospital Problem/Diagnosis:  Active Problems:    * No active hospital problems. *  Resolved Problems:    * No resolved hospital problems. *    Imaging:   CT CHEST PULMONARY EMBOLISM W CONTRAST   Final Result   No evidence of pulmonary embolism or acute pulmonary abnormality.            Abnormal labs:   Abnormal Labs Reviewed   CBC WITH AUTO DIFFERENTIAL - Abnormal; Notable for the following components:       Result Value    Hemoglobin 11.9 (*)     MCV 72.5 (*)     MCH 23.1 (*)     RDW 16.5 (*)     All other components within normal limits   COMPREHENSIVE METABOLIC PANEL W/ REFLEX TO MG FOR LOW K - Abnormal; Notable for the following components:    Glucose 102 (*)     All other components within normal limits     Critical values: no     Abnormal Assessment Findings: n/a    Background  History:   Past Medical History:   Diagnosis Date    Asthma     CHF (congestive heart failure) (HCC)     Congenital heart defect     Migraines, basilar     Palpitations     Sciatica     lower right    VSD (ventricular septal defect and aortic arch hypoplasia        Assessment    Vitals/MEWS: MEWS Score: 1  Level of Consciousness: Alert (0)   Vitals:    23 1930 23   BP: (!) 105/59 (!) 98/59 100/84

## 2023-09-21 ENCOUNTER — TELEPHONE (OUTPATIENT)
Dept: CARDIOLOGY CLINIC | Age: 38
End: 2023-09-21

## 2023-09-21 NOTE — TELEPHONE ENCOUNTER
Pt has questions as to why she is receiving home care after being released from hospital. Please call to advise

## 2023-09-21 NOTE — TELEPHONE ENCOUNTER
Called and spoke to patient about her question about home health. She states she was not aware that this was even ordered, wasn't told by anyone and was caught off guard when they called her today. Pt is still not feeling good and wanted to make sure MIKKI didn't order and wasn't telling her his real concerns. I advised pt that [address] did not order the home health and did not feel strongly that she needed it. Pt advised that if she doesn't feel she needs it then she is able to cancel if she would like. Pt is going to follow up with Pulmonary about her symptoms that she is still having. No further questions at this time.

## 2023-09-22 ENCOUNTER — CLINICAL DOCUMENTATION (OUTPATIENT)
Dept: ONCOLOGY | Age: 38
End: 2023-09-22

## 2023-09-22 PROCEDURE — 93296 REM INTERROG EVL PM/IDS: CPT | Performed by: INTERNAL MEDICINE

## 2023-09-22 PROCEDURE — 93294 REM INTERROG EVL PM/LDLS PM: CPT | Performed by: INTERNAL MEDICINE

## 2023-09-27 ENCOUNTER — TELEPHONE (OUTPATIENT)
Dept: CARDIOLOGY CLINIC | Age: 38
End: 2023-09-27

## 2023-09-27 NOTE — TELEPHONE ENCOUNTER
Dr. Zach Dominique called to speak [facility] re: an infection that the Pt has who did not receive any treatment are any med for it. Pt is in the office now at [facility]. Please call to discuss this matter.  Thank you

## 2023-10-02 ENCOUNTER — HOSPITAL ENCOUNTER (OUTPATIENT)
Dept: ONCOLOGY | Age: 38
Setting detail: INFUSION SERIES
Discharge: HOME | End: 2023-10-02
Payer: COMMERCIAL

## 2023-10-02 VITALS
DIASTOLIC BLOOD PRESSURE: 69 MMHG | RESPIRATION RATE: 16 BRPM | OXYGEN SATURATION: 100 % | TEMPERATURE: 97.1 F | SYSTOLIC BLOOD PRESSURE: 109 MMHG | HEART RATE: 67 BPM

## 2023-10-02 DIAGNOSIS — D50.9 IRON DEFICIENCY ANEMIA, UNSPECIFIED IRON DEFICIENCY ANEMIA TYPE: Primary | ICD-10-CM

## 2023-10-02 PROCEDURE — 96365 THER/PROPH/DIAG IV INF INIT: CPT

## 2023-10-02 PROCEDURE — 6360000002 HC RX W HCPCS: Performed by: INTERNAL MEDICINE

## 2023-10-02 PROCEDURE — 2580000003 HC RX 258: Performed by: INTERNAL MEDICINE

## 2023-10-02 PROCEDURE — 99211 OFF/OP EST MAY X REQ PHY/QHP: CPT

## 2023-10-02 RX ORDER — SODIUM CHLORIDE 9 MG/ML
5-250 INJECTION, SOLUTION INTRAVENOUS PRN
Status: CANCELLED | OUTPATIENT
Start: 2023-10-09

## 2023-10-02 RX ORDER — SODIUM CHLORIDE 0.9 % (FLUSH) 0.9 %
5-40 SYRINGE (ML) INJECTION PRN
Status: CANCELLED | OUTPATIENT
Start: 2023-10-09

## 2023-10-02 RX ORDER — SODIUM CHLORIDE 9 MG/ML
5-250 INJECTION, SOLUTION INTRAVENOUS PRN
Status: DISCONTINUED | OUTPATIENT
Start: 2023-10-02 | End: 2023-10-03 | Stop reason: HOSPADM

## 2023-10-02 RX ORDER — SODIUM CHLORIDE 0.9 % (FLUSH) 0.9 %
5-40 SYRINGE (ML) INJECTION PRN
Status: DISCONTINUED | OUTPATIENT
Start: 2023-10-02 | End: 2023-10-03 | Stop reason: HOSPADM

## 2023-10-02 RX ADMIN — Medication 200 MG: at 14:47

## 2023-10-02 RX ADMIN — SODIUM CHLORIDE 200 ML/HR: 9 INJECTION, SOLUTION INTRAVENOUS at 14:43

## 2023-10-02 RX ADMIN — Medication 10 ML: at 14:47

## 2023-10-02 NOTE — PROGRESS NOTES
Patient ambulatory to department for first of five doses of venofer. Tolerated venofer infusion well with no adverse rx noted. Given avs with information about venofer. Verbally told about most common possible side effects. To return next week Monday for next infusion.

## 2023-10-09 ENCOUNTER — HOSPITAL ENCOUNTER (OUTPATIENT)
Dept: ONCOLOGY | Age: 38
Setting detail: INFUSION SERIES
Discharge: HOME | End: 2023-10-09
Payer: COMMERCIAL

## 2023-10-09 VITALS
SYSTOLIC BLOOD PRESSURE: 105 MMHG | DIASTOLIC BLOOD PRESSURE: 69 MMHG | HEART RATE: 92 BPM | RESPIRATION RATE: 16 BRPM | TEMPERATURE: 98.6 F

## 2023-10-09 DIAGNOSIS — D50.9 IRON DEFICIENCY ANEMIA, UNSPECIFIED IRON DEFICIENCY ANEMIA TYPE: Primary | ICD-10-CM

## 2023-10-09 PROCEDURE — 96365 THER/PROPH/DIAG IV INF INIT: CPT

## 2023-10-09 PROCEDURE — 2580000003 HC RX 258: Performed by: INTERNAL MEDICINE

## 2023-10-09 PROCEDURE — 99211 OFF/OP EST MAY X REQ PHY/QHP: CPT

## 2023-10-09 PROCEDURE — 6360000002 HC RX W HCPCS: Performed by: INTERNAL MEDICINE

## 2023-10-09 RX ORDER — SODIUM CHLORIDE 0.9 % (FLUSH) 0.9 %
5-40 SYRINGE (ML) INJECTION PRN
Status: CANCELLED | OUTPATIENT
Start: 2023-10-16

## 2023-10-09 RX ORDER — SODIUM CHLORIDE 9 MG/ML
5-250 INJECTION, SOLUTION INTRAVENOUS PRN
Status: CANCELLED | OUTPATIENT
Start: 2023-10-16

## 2023-10-09 RX ORDER — SODIUM CHLORIDE 9 MG/ML
5-250 INJECTION, SOLUTION INTRAVENOUS PRN
Status: DISCONTINUED | OUTPATIENT
Start: 2023-10-09 | End: 2023-10-10 | Stop reason: HOSPADM

## 2023-10-09 RX ORDER — SODIUM CHLORIDE 0.9 % (FLUSH) 0.9 %
5-40 SYRINGE (ML) INJECTION PRN
Status: DISCONTINUED | OUTPATIENT
Start: 2023-10-09 | End: 2023-10-10 | Stop reason: HOSPADM

## 2023-10-09 RX ADMIN — Medication 10 ML: at 14:38

## 2023-10-09 RX ADMIN — SODIUM CHLORIDE 100 ML/HR: 9 INJECTION, SOLUTION INTRAVENOUS at 14:39

## 2023-10-09 RX ADMIN — Medication 200 MG: at 14:49

## 2023-10-16 ENCOUNTER — HOSPITAL ENCOUNTER (OUTPATIENT)
Dept: ONCOLOGY | Age: 38
Setting detail: INFUSION SERIES
Discharge: HOME | End: 2023-10-16
Payer: COMMERCIAL

## 2023-10-16 VITALS
DIASTOLIC BLOOD PRESSURE: 73 MMHG | SYSTOLIC BLOOD PRESSURE: 109 MMHG | TEMPERATURE: 97.2 F | HEART RATE: 62 BPM | RESPIRATION RATE: 16 BRPM

## 2023-10-16 DIAGNOSIS — D50.9 IRON DEFICIENCY ANEMIA, UNSPECIFIED IRON DEFICIENCY ANEMIA TYPE: Primary | ICD-10-CM

## 2023-10-16 PROCEDURE — 6360000002 HC RX W HCPCS: Performed by: INTERNAL MEDICINE

## 2023-10-16 PROCEDURE — 2580000003 HC RX 258: Performed by: INTERNAL MEDICINE

## 2023-10-16 PROCEDURE — 96365 THER/PROPH/DIAG IV INF INIT: CPT

## 2023-10-16 PROCEDURE — 99211 OFF/OP EST MAY X REQ PHY/QHP: CPT

## 2023-10-16 RX ORDER — SODIUM CHLORIDE 9 MG/ML
5-250 INJECTION, SOLUTION INTRAVENOUS PRN
Status: CANCELLED | OUTPATIENT
Start: 2023-10-23

## 2023-10-16 RX ORDER — SODIUM CHLORIDE 0.9 % (FLUSH) 0.9 %
5-40 SYRINGE (ML) INJECTION PRN
Status: DISCONTINUED | OUTPATIENT
Start: 2023-10-16 | End: 2023-10-17 | Stop reason: HOSPADM

## 2023-10-16 RX ORDER — SODIUM CHLORIDE 0.9 % (FLUSH) 0.9 %
5-40 SYRINGE (ML) INJECTION PRN
Status: CANCELLED | OUTPATIENT
Start: 2023-10-23

## 2023-10-16 RX ORDER — SODIUM CHLORIDE 9 MG/ML
5-250 INJECTION, SOLUTION INTRAVENOUS PRN
Status: DISCONTINUED | OUTPATIENT
Start: 2023-10-16 | End: 2023-10-17 | Stop reason: HOSPADM

## 2023-10-16 RX ADMIN — Medication 10 ML: at 15:03

## 2023-10-16 RX ADMIN — Medication 200 MG: at 15:06

## 2023-10-16 RX ADMIN — SODIUM CHLORIDE 200 ML/HR: 9 INJECTION, SOLUTION INTRAVENOUS at 15:03

## 2023-10-16 NOTE — PROGRESS NOTES
Patient ambulatory to department for third  of five doses of venofer. Tolerated venofer infusion well with no adverse rx noted. Denied need for printed AVS. To return next week for next infusion.

## 2023-10-23 ENCOUNTER — HOSPITAL ENCOUNTER (OUTPATIENT)
Dept: ONCOLOGY | Age: 38
Setting detail: INFUSION SERIES
Discharge: HOME | End: 2023-10-23
Payer: COMMERCIAL

## 2023-10-23 VITALS
DIASTOLIC BLOOD PRESSURE: 75 MMHG | TEMPERATURE: 97.1 F | SYSTOLIC BLOOD PRESSURE: 112 MMHG | HEART RATE: 69 BPM | RESPIRATION RATE: 16 BRPM

## 2023-10-23 DIAGNOSIS — D50.9 IRON DEFICIENCY ANEMIA, UNSPECIFIED IRON DEFICIENCY ANEMIA TYPE: Primary | ICD-10-CM

## 2023-10-23 PROCEDURE — 99211 OFF/OP EST MAY X REQ PHY/QHP: CPT

## 2023-10-23 PROCEDURE — 96365 THER/PROPH/DIAG IV INF INIT: CPT

## 2023-10-23 PROCEDURE — 6360000002 HC RX W HCPCS: Performed by: INTERNAL MEDICINE

## 2023-10-23 PROCEDURE — 2580000003 HC RX 258: Performed by: INTERNAL MEDICINE

## 2023-10-23 RX ORDER — SODIUM CHLORIDE 9 MG/ML
5-250 INJECTION, SOLUTION INTRAVENOUS PRN
Status: CANCELLED | OUTPATIENT
Start: 2023-10-30

## 2023-10-23 RX ORDER — SODIUM CHLORIDE 0.9 % (FLUSH) 0.9 %
5-40 SYRINGE (ML) INJECTION PRN
Status: CANCELLED | OUTPATIENT
Start: 2023-10-30

## 2023-10-23 RX ORDER — SODIUM CHLORIDE 9 MG/ML
5-250 INJECTION, SOLUTION INTRAVENOUS PRN
Status: DISCONTINUED | OUTPATIENT
Start: 2023-10-23 | End: 2023-10-24 | Stop reason: HOSPADM

## 2023-10-23 RX ORDER — SODIUM CHLORIDE 0.9 % (FLUSH) 0.9 %
5-40 SYRINGE (ML) INJECTION PRN
Status: DISCONTINUED | OUTPATIENT
Start: 2023-10-23 | End: 2023-10-24 | Stop reason: HOSPADM

## 2023-10-23 RX ADMIN — SODIUM CHLORIDE 220 ML/HR: 9 INJECTION, SOLUTION INTRAVENOUS at 15:22

## 2023-10-23 RX ADMIN — Medication 200 MG: at 15:22

## 2023-10-23 NOTE — PROGRESS NOTES
Patient ambulatory to department for fourth of five doses of venofer. Tolerated venofer infusion well with no adverse rx noted. Denied need for printed AVS. To return next week for next infusion.

## 2023-10-30 ENCOUNTER — HOSPITAL ENCOUNTER (OUTPATIENT)
Dept: ONCOLOGY | Age: 38
Setting detail: INFUSION SERIES
Discharge: HOME | End: 2023-10-30
Payer: COMMERCIAL

## 2023-10-30 VITALS
HEART RATE: 75 BPM | RESPIRATION RATE: 18 BRPM | TEMPERATURE: 98.1 F | SYSTOLIC BLOOD PRESSURE: 112 MMHG | DIASTOLIC BLOOD PRESSURE: 63 MMHG

## 2023-10-30 DIAGNOSIS — D50.9 IRON DEFICIENCY ANEMIA, UNSPECIFIED IRON DEFICIENCY ANEMIA TYPE: Primary | ICD-10-CM

## 2023-10-30 PROCEDURE — 6360000002 HC RX W HCPCS: Performed by: INTERNAL MEDICINE

## 2023-10-30 PROCEDURE — 96365 THER/PROPH/DIAG IV INF INIT: CPT

## 2023-10-30 PROCEDURE — 2580000003 HC RX 258: Performed by: INTERNAL MEDICINE

## 2023-10-30 PROCEDURE — 99211 OFF/OP EST MAY X REQ PHY/QHP: CPT

## 2023-10-30 RX ORDER — SODIUM CHLORIDE 9 MG/ML
5-250 INJECTION, SOLUTION INTRAVENOUS PRN
Status: DISCONTINUED | OUTPATIENT
Start: 2023-10-30 | End: 2023-10-30

## 2023-10-30 RX ORDER — SODIUM CHLORIDE 9 MG/ML
5-250 INJECTION, SOLUTION INTRAVENOUS PRN
Status: CANCELLED | OUTPATIENT
Start: 2023-10-30

## 2023-10-30 RX ORDER — SODIUM CHLORIDE 0.9 % (FLUSH) 0.9 %
5-40 SYRINGE (ML) INJECTION PRN
Status: DISCONTINUED | OUTPATIENT
Start: 2023-10-30 | End: 2023-10-30

## 2023-10-30 RX ORDER — SODIUM CHLORIDE 0.9 % (FLUSH) 0.9 %
5-40 SYRINGE (ML) INJECTION PRN
Status: CANCELLED | OUTPATIENT
Start: 2023-10-30

## 2023-10-30 RX ADMIN — SODIUM CHLORIDE 220 ML/HR: 9 INJECTION, SOLUTION INTRAVENOUS at 14:55

## 2023-10-30 RX ADMIN — Medication 10 ML: at 14:56

## 2023-10-30 RX ADMIN — SODIUM CHLORIDE 100 ML/HR: 9 INJECTION, SOLUTION INTRAVENOUS at 14:54

## 2023-10-30 RX ADMIN — Medication 200 MG: at 14:56

## 2023-10-30 NOTE — PROGRESS NOTES
Patient ambulatory to department for fifth of five doses of venofer. Tolerated venofer infusion well with no adverse rx noted. Given avs with information about venofer. Verbally told about most common possible side effects. To follow up with doctor for any further plan of care.

## 2023-11-07 ENCOUNTER — TELEPHONE (OUTPATIENT)
Dept: CARDIOLOGY CLINIC | Age: 38
End: 2023-11-07

## 2023-11-07 NOTE — TELEPHONE ENCOUNTER
Pt states she has been having some episodes and has sent a manual transmission. Please review and call to advise.

## 2023-11-08 NOTE — TELEPHONE ENCOUNTER
No changes to treatment. Her episodes are stable on her device without any increase. Very brief episodes of tachycardia. Please ask what symptoms she is having?

## 2023-11-08 NOTE — TELEPHONE ENCOUNTER
Spoke to PT relayed message per [employer] about transmission. PT is having issues laying on side and stomach as it induces coughing. Pt also experiencing SOB and chest pains. Please advise.

## 2023-11-08 NOTE — TELEPHONE ENCOUNTER
We received remote transmission from patient's monitor at home. Transmission shows normal sensing and pacing function. Current ECG shows Sinus  Short Atrial runs and NSVT episodes noted. Last Atrial run noted on 11/6/2023 lasting ~3 seconds. Last NSVT episode noted on 10/31/2023, lasting 2 seconds. EP will review. See interrogation in [facility] or [facility] for more details.

## 2023-11-09 NOTE — TELEPHONE ENCOUNTER
Case discussed with MEL. No concerning issues on interrogation and no changes to be made at this time. Updated Lisa Coleman. She verbalized understanding.

## 2023-12-01 ENCOUNTER — HOSPITAL ENCOUNTER (EMERGENCY)
Age: 38
Discharge: HOME | End: 2023-12-02
Attending: EMERGENCY MEDICINE
Payer: COMMERCIAL

## 2023-12-01 ENCOUNTER — APPOINTMENT (OUTPATIENT)
Dept: GENERAL RADIOLOGY | Age: 38
End: 2023-12-01
Payer: COMMERCIAL

## 2023-12-01 DIAGNOSIS — R07.89 CHEST DISCOMFORT: Primary | ICD-10-CM

## 2023-12-01 LAB
ALBUMIN SERPL-MCNC: 4 G/DL (ref 3.4–5)
ALBUMIN/GLOB SERPL: 1.1 {RATIO} (ref 1.1–2.2)
ALP SERPL-CCNC: 62 U/L (ref 40–129)
ALT SERPL-CCNC: 27 U/L (ref 10–40)
ANION GAP SERPL CALCULATED.3IONS-SCNC: 10 MMOL/L (ref 3–16)
AST SERPL-CCNC: 49 U/L (ref 15–37)
BASOPHILS # BLD: 0.1 K/UL (ref 0–0.2)
BASOPHILS NFR BLD: 0.9 %
BILIRUB SERPL-MCNC: 0.3 MG/DL (ref 0–1)
BUN SERPL-MCNC: 12 MG/DL (ref 7–20)
CALCIUM SERPL-MCNC: 9.5 MG/DL (ref 8.3–10.6)
CHLORIDE SERPL-SCNC: 100 MMOL/L (ref 99–110)
CO2 SERPL-SCNC: 26 MMOL/L (ref 21–32)
CREAT SERPL-MCNC: 0.6 MG/DL (ref 0.6–1.1)
D DIMER: 0.49 UG/ML FEU (ref 0–0.6)
DEPRECATED RDW RBC AUTO: 15.8 % (ref 12.4–15.4)
EOSINOPHIL # BLD: 0.4 K/UL (ref 0–0.6)
EOSINOPHIL NFR BLD: 5 %
GFR SERPLBLD CREATININE-BSD FMLA CKD-EPI: >60 ML/MIN/{1.73_M2}
GLUCOSE SERPL-MCNC: 135 MG/DL (ref 70–99)
HCG SERPL QL: NEGATIVE
HCT VFR BLD AUTO: 41.8 % (ref 36–48)
HGB BLD-MCNC: 13.8 G/DL (ref 12–16)
LYMPHOCYTES # BLD: 1.9 K/UL (ref 1–5.1)
LYMPHOCYTES NFR BLD: 25.7 %
MCH RBC QN AUTO: 25.2 PG (ref 26–34)
MCHC RBC AUTO-ENTMCNC: 33 G/DL (ref 31–36)
MCV RBC AUTO: 76.4 FL (ref 80–100)
MONOCYTES # BLD: 0.7 K/UL (ref 0–1.3)
MONOCYTES NFR BLD: 9.5 %
NEUTROPHILS # BLD: 4.5 K/UL (ref 1.7–7.7)
NEUTROPHILS NFR BLD: 58.9 %
NT-PROBNP SERPL-MCNC: 40 PG/ML (ref 0–124)
PLATELET # BLD AUTO: 340 K/UL (ref 135–450)
PMV BLD AUTO: 8 FL (ref 5–10.5)
POTASSIUM SERPL-SCNC: 3.5 MMOL/L (ref 3.5–5.1)
POTASSIUM SERPL-SCNC: 5.7 MMOL/L (ref 3.5–5.1)
PROT SERPL-MCNC: 7.6 G/DL (ref 6.4–8.2)
RBC # BLD AUTO: 5.48 M/UL (ref 4–5.2)
SODIUM SERPL-SCNC: 136 MMOL/L (ref 136–145)
TROPONIN, HIGH SENSITIVITY: <6 NG/L (ref 0–14)
TROPONIN, HIGH SENSITIVITY: <6 NG/L (ref 0–14)
WBC # BLD AUTO: 7.6 K/UL (ref 4–11)

## 2023-12-01 PROCEDURE — 80053 COMPREHEN METABOLIC PANEL: CPT

## 2023-12-01 PROCEDURE — 71045 X-RAY EXAM CHEST 1 VIEW: CPT

## 2023-12-01 PROCEDURE — 84703 CHORIONIC GONADOTROPIN ASSAY: CPT

## 2023-12-01 PROCEDURE — 85025 COMPLETE CBC W/AUTO DIFF WBC: CPT

## 2023-12-01 PROCEDURE — 84484 ASSAY OF TROPONIN QUANT: CPT

## 2023-12-01 PROCEDURE — 99285 EMERGENCY DEPT VISIT HI MDM: CPT

## 2023-12-01 PROCEDURE — 83880 ASSAY OF NATRIURETIC PEPTIDE: CPT

## 2023-12-01 PROCEDURE — 93005 ELECTROCARDIOGRAM TRACING: CPT | Performed by: EMERGENCY MEDICINE

## 2023-12-01 PROCEDURE — 85379 FIBRIN DEGRADATION QUANT: CPT

## 2023-12-01 PROCEDURE — 84132 ASSAY OF SERUM POTASSIUM: CPT

## 2023-12-01 ASSESSMENT — PAIN SCALES - GENERAL: PAINLEVEL_OUTOF10: 5

## 2023-12-01 ASSESSMENT — PAIN - FUNCTIONAL ASSESSMENT: PAIN_FUNCTIONAL_ASSESSMENT: 0-10

## 2023-12-02 VITALS
OXYGEN SATURATION: 98 % | HEIGHT: 69 IN | BODY MASS INDEX: 43.4 KG/M2 | HEART RATE: 75 BPM | WEIGHT: 293 LBS | RESPIRATION RATE: 19 BRPM | DIASTOLIC BLOOD PRESSURE: 81 MMHG | TEMPERATURE: 98.4 F | SYSTOLIC BLOOD PRESSURE: 125 MMHG

## 2023-12-02 LAB
EKG ATRIAL RATE: 91 BPM
EKG DIAGNOSIS: NORMAL
EKG P AXIS: 54 DEGREES
EKG P-R INTERVAL: 186 MS
EKG Q-T INTERVAL: 412 MS
EKG QRS DURATION: 148 MS
EKG QTC CALCULATION (BAZETT): 506 MS
EKG R AXIS: 43 DEGREES
EKG T AXIS: 85 DEGREES
EKG VENTRICULAR RATE: 91 BPM

## 2023-12-02 PROCEDURE — 93010 ELECTROCARDIOGRAM REPORT: CPT | Performed by: INTERNAL MEDICINE

## 2023-12-02 RX ORDER — OMEPRAZOLE 20 MG/1
20 CAPSULE, DELAYED RELEASE ORAL
Qty: 30 CAPSULE | Refills: 0 | Status: SHIPPED | OUTPATIENT
Start: 2023-12-02

## 2023-12-02 RX ORDER — NAPROXEN 500 MG/1
500 TABLET ORAL 2 TIMES DAILY WITH MEALS
Qty: 30 TABLET | Refills: 0 | Status: SHIPPED | OUTPATIENT
Start: 2023-12-02 | End: 2023-12-02

## 2023-12-02 RX ORDER — IBUPROFEN 600 MG/1
600 TABLET ORAL EVERY 6 HOURS PRN
Qty: 30 TABLET | Refills: 0 | Status: SHIPPED | OUTPATIENT
Start: 2023-12-02

## 2023-12-02 ASSESSMENT — HEART SCORE
ECG: 0
ECG: NORMAL

## 2023-12-02 NOTE — ED PROVIDER NOTES
[facility] ENCOUNTER        Pt Name: Sanford Malagon  MRN: [id number]  [address] 1985  Date of evaluation: 12/1/2023  Provider: FOSTER Hughes  PCP: Pancho Mtz MD  Note Started: 12:18 AM EST 12/2/23       I have seen and evaluated this patient with my supervising physician Triny Landers DO.      CHIEF COMPLAINT       Chief Complaint   Patient presents with    Chest Pain     Pt states that she started having CP since she has been receiving iron infusions. Pt states that she sees Dr. Santosh Miller and called the office and whoever answered told her \"they couldn't help her\". Pt states that she has sharp CP  on and off. Pt states tonight she felt a warm sensation into her throat and SOB. Shortness of Breath       HISTORY OF PRESENT ILLNESS: 1 or more Elements     History From: Patient  Limitations to history : None    Sanford Malagon is a 45 y.o. female with past medical history of atrial fibrillation, complete heart block status post pacemaker, CHF, congenital heart disease and prediabetes who presents ED with complaint of chest discomfort. Patient reports she is status post iron infusion x 5. Last iron infusion was about a month ago. She states ever since she started in the iron infusion she has had some intermittent chest discomfort. She reports chest discomfort diffusely throughout her chest which she states will occur randomly. Denies any aggravating leaving factors. Has been ongoing for the past several weeks. Patient states occasionally she will have shortness of breath and a feeling of a warm sensation in her throat. She became concerned and came to the ED for further evaluation treatment. Last episode was earlier today. She denies any symptoms currently. She denies current chest pain or shortness of breath. She denies any pleuritic discomfort, orthopnea, pedal edema or calf tenderness.   Denies abdominal pain, ventricular tachycardia on 11/16/2023. No other irregular rhythms noted. Believe can be safely discharged home with close outpatient follow-up. Last episode of irregular heart rhythm over 2 weeks ago with no recurrent episodes and believe can be safely discharged home with close return precautions. I am the Primary Clinician of Record. FINAL IMPRESSION      1. Chest discomfort          DISPOSITION/PLAN     DISPOSITION Decision To Discharge 12/02/2023 12:15:52 AM      PATIENT REFERRED TO:  Patricia Olson, [address] [address] [facility],[address]  [phone]    Schedule an appointment as soon as possible for a visit   For a Re-check in  3-5    days.     [facility] Emergency Department  [address] [facility] [address] [facility]  [address]   [phone]  Go to   As needed, If symptoms worsen      DISCHARGE MEDICATIONS:  New Prescriptions    NAPROXEN (NAPROSYN) 500 MG TABLET    Take 1 tablet by mouth 2 times daily (with meals)    OMEPRAZOLE (PRILOSEC) 20 MG DELAYED RELEASE CAPSULE    Take 1 capsule by mouth every morning (before breakfast)       DISCONTINUED MEDICATIONS:  Discontinued Medications    No medications on file              (Please note that portions of this note were completed with a voice recognition program.  Efforts were made to edit the dictations but occasionally words are mis-transcribed.)    FOSTER Wu (electronically signed)       Gigi Quiles [place]  12/02/23 0024

## 2023-12-02 NOTE — ED PROVIDER NOTES
[facility] --       Pain Score --       Pain Loc --       Pain Edu? --       Excl. in [address]? --      Physical Exam  Vitals and nursing note reviewed. Constitutional:       General: She is not in acute distress. Appearance: She is not ill-appearing or toxic-appearing. Cardiovascular:      Rate and Rhythm: Normal rate and regular rhythm. Pulmonary:      Effort: Pulmonary effort is normal. No respiratory distress. Neurological:      Mental Status: She is alert. She is disoriented. EKG performed in ED:  The Ekg interpreted by me in the absence of a cardiologist shows. Normal sinus rhythm with a rate of 91  Axis is   Normal  QTc is   506  Right bundle branch block  No specific ST-T wave changes appreciated. No evidence of acute ischemia. RADIOLOGY  Any applicable radiology studies including x-ray, CT, MRI, and/or ultrasound, were reviewed independently by me in addition to the radiologist.  I reviewed all radiology images and reports as well from this evaluation. XR CHEST PORTABLE   Final Result   No acute process. LABS  I have personally reviewed all labs for this visit.    Results for orders placed or performed during the hospital encounter of 12/01/23   CBC with Auto Differential   Result Value Ref Range    WBC 7.6 4.0 - 11.0 K/uL    RBC 5.48 (H) 4.00 - 5.20 M/uL    Hemoglobin 13.8 12.0 - 16.0 g/dL    Hematocrit 41.8 36.0 - 48.0 %    MCV 76.4 (L) 80.0 - 100.0 fL    MCH 25.2 (L) 26.0 - 34.0 pg    MCHC 33.0 31.0 - 36.0 g/dL    RDW 15.8 (H) 12.4 - 15.4 %    Platelets 967 373 - 981 K/uL    MPV 8.0 5.0 - 10.5 fL    Neutrophils % 58.9 %    Lymphocytes % 25.7 %    Monocytes % 9.5 %    Eosinophils % 5.0 %    Basophils % 0.9 %    Neutrophils Absolute 4.5 1.7 - 7.7 K/uL    Lymphocytes Absolute 1.9 1.0 - 5.1 K/uL    Monocytes Absolute 0.7 0.0 - 1.3 K/uL    Eosinophils Absolute 0.4 0.0 - 0.6 K/uL    Basophils Absolute 0.1 0.0 - 0.2 K/uL   Comprehensive Metabolic Panel w/ Reflex to MG   Result Value Ref Range    Sodium 136 136 - 145 mmol/L    Potassium reflex Magnesium 5.7 (H) 3.5 - 5.1 mmol/L    Chloride 100 99 - 110 mmol/L    CO2 26 21 - 32 mmol/L    Anion Gap 10 3 - 16    Glucose 135 (H) 70 - 99 mg/dL    BUN 12 7 - 20 mg/dL    Creatinine 0.6 0.6 - 1.1 mg/dL    Est, Glom Filt Rate >60 >60    Calcium 9.5 8.3 - 10.6 mg/dL    Total Protein 7.6 6.4 - 8.2 g/dL    Albumin 4.0 3.4 - 5.0 g/dL    Albumin/Globulin Ratio 1.1 1.1 - 2.2    Total Bilirubin 0.3 0.0 - 1.0 mg/dL    Alkaline Phosphatase 62 40 - 129 U/L    ALT 27 10 - 40 U/L    AST 49 (H) 15 - 37 U/L   Troponin   Result Value Ref Range    Troponin, High Sensitivity <6 0 - 14 ng/L   Brain Natriuretic Peptide   Result Value Ref Range    Pro-BNP 40 0 - 124 pg/mL   HCG Qualitative, Serum   Result Value Ref Range    hCG Qual Negative Detects HCG level >10 MIU/mL   D-Dimer, Quantitative   Result Value Ref Range    D-Dimer, Quant 0.49 0.00 - 0.60 ug/mL FEU   Troponin   Result Value Ref Range    Troponin, High Sensitivity <6 0 - 14 ng/L   Potassium   Result Value Ref Range    Potassium 3.5 3.5 - 5.1 mmol/L   EKG 12 Lead   Result Value Ref Range    Ventricular Rate 91 BPM    Atrial Rate 91 BPM    P-R Interval 186 ms    QRS Duration 148 ms    Q-T Interval 412 ms    QTc Calculation (Bazett) 506 ms    P Axis 54 degrees    R Axis 43 degrees    T Axis 85 degrees    Diagnosis       Normal sinus rhythmRight bundle branch blockT wave abnormality, consider lateral ischemiaAbnormal ECG       BRIEF ED COURSE/MDM  Old records reviewed. Labs and imaging reviewed. Patient seen and evaluated by myself along with the CRISTAL. Patient does present for episodes of chest pain and concern for arrhythmia. Patient looks well here. Her defibrillator is interrogated and shows no recent arrhythmias. Patient is treated symptomatically with improvement will be able to be discharged follow-up with her doctor as the remainder of her workup is unremarkable.     Medications given:  Medications

## 2023-12-08 ENCOUNTER — TELEPHONE (OUTPATIENT)
Dept: CARDIOLOGY CLINIC | Age: 38
End: 2023-12-08

## 2023-12-08 NOTE — TELEPHONE ENCOUNTER
Pt states she needs to know her heart is okay. Pt was at her PCP office today and they decided to try the omeprazole again to see if that would help with chest pain. When she went to the ER, they added another stomach medication that she did not start at this time. Pt is concerned it could be her heart because she did not understand the last conversation she had with the nurse. Pt sees MIKKI and MEL. Please advise.

## 2023-12-11 NOTE — TELEPHONE ENCOUNTER
Spoke to [employer] per Renetta Bose. She was concerned after speaking to EP RN who stated \"there is nothing else we can do for you\". Pt wanted to know \"what exactly does this mean\"? She states she has had continuous discomfort and the last episode she had all over body warmth, SOB and it was very scary for her and the conversation with the nurse made it worse. She was seen for her chest pain, they believe it to be reflux. She was concerned about starting the omperazole due to [facility] discontinuing it and wanted to make sure he is okay with her restarting. 2. I advised her of [employer] message and reassured her that he is not concerned at this time, but pt would like me to verify this with [facility]. She is going to send a transmission later today to [facility] and I did schedule her for 1/12/24 at 10am. I advised her that I will send her a [employer] message once MIKKI responds in regards to her questions and will also put appt in writing so she has it since she was driving while we spoke.

## 2023-12-12 ENCOUNTER — PATIENT MESSAGE (OUTPATIENT)
Dept: CARDIOLOGY CLINIC | Age: 38
End: 2023-12-12

## 2023-12-12 NOTE — TELEPHONE ENCOUNTER
From: Tasia Roland  Sent: 12/12/2023 7:51 AM EST  To: [facility] [address] Staff  Subject: questions    Gm thank you and does he have any others concerns that I should know about in regards to what we discussed as well?  Thank you!!!!

## 2023-12-13 ENCOUNTER — PATIENT MESSAGE (OUTPATIENT)
Dept: CARDIOLOGY CLINIC | Age: 38
End: 2023-12-13

## 2023-12-13 NOTE — TELEPHONE ENCOUNTER
Jay Rodriguez reviewed your pacemaker interrogation. Everything is reassuring. You are having a slight increase in the number of PVCs (innocent early beats from the bottom chamber) from 6 per hour to about 8 per hour, so on average every 7 minutes. You only had one episode of VT since your last interrogation. Nothing here is life-threatening or worrisome. You might be feeling your PVCs, and having a sensation of pain when they occur. If your blood pressure is high enough, we could potentially increase your metoprolol a little bit to make them a little less frequent.     Thanks    Dr Jackelin Vargas

## 2023-12-22 PROCEDURE — 93296 REM INTERROG EVL PM/IDS: CPT | Performed by: INTERNAL MEDICINE

## 2023-12-22 PROCEDURE — 93294 REM INTERROG EVL PM/LDLS PM: CPT | Performed by: INTERNAL MEDICINE

## 2024-01-09 NOTE — PROGRESS NOTES
pertinently:  The pulmonic valve was not well delineated. There is no pulmonary valve stenosis. There is mild pulmonary valve regurgitation.    Limited Echo 03/22/22   Summary   Left ventricular cavity size is normal with normal left ventricular wall   thickness.   Overall left ventricular systolic function appears mildly reduced. Ejection   fraction is visually estimated to be 45%.   No pericardial effusion noted.      Cardiac MRI  No results found for this or any previous visit.    Stress:  No results found for this or any previous visit.     ISCHEMIC EVALUATION / CATH RESULTS  Cath:   No results found for this or any previous visit.     Calcium score   No results found for this or any previous visit.    Coronary CTA   No results found for this or any previous visit.    CARDIAC RHYTHM ASSESSMENT:  Holter/Event monitor  No results found for this or any previous visit.    EP studies / cardioversions   Pacemaker [facility] transmission - I personally reviewed strips with Dr. Tomlin  Very low burden of AT, overall < 0.1%, runs as fast as 150 bpm, longest only 19 sec    03/22/22 S/p ILR removal, EPS, and RFA of typical atrial flutter     8/2/19:  Insertion of dual chamber pacemaker for CHB    ADDITIONAL IMAGING:   CXR 12/1/23  Reason for Exam: Chest Pain; Shortness of Breath  FINDINGS:  The lungs are without acute focal process.  There is no effusion or  pneumothorax. The cardiomediastinal silhouette is without acute process. The  osseous structures are without acute process.   IMPRESSION:  No acute process.  Outside/Care everywhere records Reviewed  Labs Reviewed  Prior Imaging, ekg, cath, echo reviewed when available  Medications reviewed  Old Notes reviewed  ASSESSMENT AND PLAN     Encounter Diagnoses   Name Primary?    Congenital heart disease Yes    Dyspnea and respiratory abnormalities     Chronic diastolic heart failure (HCC)     PAF (paroxysmal atrial fibrillation) (HCC)     Pacemaker     CHB (complete heart

## 2024-01-12 ENCOUNTER — HOSPITAL ENCOUNTER (OUTPATIENT)
Dept: GENERAL RADIOLOGY | Age: 39
Discharge: HOME | End: 2024-01-12
Payer: COMMERCIAL

## 2024-01-12 ENCOUNTER — HOSPITAL ENCOUNTER (OUTPATIENT)
Age: 39
Discharge: HOME | End: 2024-01-12
Payer: COMMERCIAL

## 2024-01-12 ENCOUNTER — OFFICE VISIT (OUTPATIENT)
Dept: CARDIOLOGY CLINIC | Age: 39
End: 2024-01-12

## 2024-01-12 VITALS
HEART RATE: 68 BPM | SYSTOLIC BLOOD PRESSURE: 122 MMHG | BODY MASS INDEX: 43.4 KG/M2 | WEIGHT: 293 LBS | OXYGEN SATURATION: 98 % | DIASTOLIC BLOOD PRESSURE: 74 MMHG | HEIGHT: 69 IN

## 2024-01-12 DIAGNOSIS — R06.09 DOE (DYSPNEA ON EXERTION): ICD-10-CM

## 2024-01-12 DIAGNOSIS — Q24.9 CONGENITAL HEART DISEASE: ICD-10-CM

## 2024-01-12 DIAGNOSIS — R06.00 DYSPNEA AND RESPIRATORY ABNORMALITIES: ICD-10-CM

## 2024-01-12 DIAGNOSIS — R73.03 PREDIABETES: ICD-10-CM

## 2024-01-12 DIAGNOSIS — I44.2 CHB (COMPLETE HEART BLOCK) (HCC): ICD-10-CM

## 2024-01-12 DIAGNOSIS — E66.01 OBESITIES, MORBID (HCC): ICD-10-CM

## 2024-01-12 DIAGNOSIS — I48.0 PAF (PAROXYSMAL ATRIAL FIBRILLATION) (HCC): ICD-10-CM

## 2024-01-12 DIAGNOSIS — D50.9 MICROCYTIC ANEMIA: ICD-10-CM

## 2024-01-12 DIAGNOSIS — Z95.0 PACEMAKER: ICD-10-CM

## 2024-01-12 DIAGNOSIS — E66.01 MORBID OBESITY WITH BMI OF 45.0-49.9, ADULT (HCC): ICD-10-CM

## 2024-01-12 DIAGNOSIS — I47.19 ATRIAL TACHYCARDIA: ICD-10-CM

## 2024-01-12 DIAGNOSIS — I36.1 NONRHEUMATIC TRICUSPID VALVE REGURGITATION: ICD-10-CM

## 2024-01-12 DIAGNOSIS — I48.3 TYPICAL ATRIAL FLUTTER (HCC): ICD-10-CM

## 2024-01-12 DIAGNOSIS — Q24.9 CONGENITAL HEART DISEASE: Primary | ICD-10-CM

## 2024-01-12 DIAGNOSIS — I47.20 VT (VENTRICULAR TACHYCARDIA) (HCC): ICD-10-CM

## 2024-01-12 DIAGNOSIS — I50.32 CHRONIC DIASTOLIC HEART FAILURE (HCC): ICD-10-CM

## 2024-01-12 DIAGNOSIS — R06.89 DYSPNEA AND RESPIRATORY ABNORMALITIES: ICD-10-CM

## 2024-01-12 LAB
EST. AVERAGE GLUCOSE BLD GHB EST-MCNC: 114 MG/DL
FERRITIN SERPL IA-MCNC: 374.4 NG/ML (ref 15–150)
HBA1C MFR BLD: 5.6 %
IRON SATN MFR SERPL: 21 % (ref 15–50)
IRON SERPL-MCNC: 75 UG/DL (ref 37–145)
NT-PROBNP SERPL-MCNC: 114 PG/ML (ref 0–124)
TIBC SERPL-MCNC: 352 UG/DL (ref 260–445)

## 2024-01-12 PROCEDURE — 82728 ASSAY OF FERRITIN: CPT

## 2024-01-12 PROCEDURE — 36415 COLL VENOUS BLD VENIPUNCTURE: CPT

## 2024-01-12 PROCEDURE — 83550 IRON BINDING TEST: CPT

## 2024-01-12 PROCEDURE — 71046 X-RAY EXAM CHEST 2 VIEWS: CPT

## 2024-01-12 PROCEDURE — 83540 ASSAY OF IRON: CPT

## 2024-01-12 PROCEDURE — 83036 HEMOGLOBIN GLYCOSYLATED A1C: CPT

## 2024-01-12 PROCEDURE — 83880 ASSAY OF NATRIURETIC PEPTIDE: CPT

## 2024-01-12 RX ORDER — SOTALOL HYDROCHLORIDE 120 MG/1
120 TABLET ORAL 2 TIMES DAILY
Qty: 180 TABLET | Refills: 3
Start: 2024-01-12

## 2024-01-12 RX ORDER — BUPROPION HYDROCHLORIDE 150 MG/1
150 TABLET ORAL 2 TIMES DAILY
Qty: 180 TABLET | Refills: 3 | Status: SHIPPED | OUTPATIENT
Start: 2024-01-12

## 2024-01-12 RX ORDER — BUPROPION HYDROCHLORIDE 150 MG/1
150 TABLET, FILM COATED, EXTENDED RELEASE ORAL 2 TIMES DAILY
Qty: 180 TABLET | Refills: 3 | Status: SHIPPED | OUTPATIENT
Start: 2024-01-12 | End: 2024-01-12 | Stop reason: CLARIF

## 2024-01-12 NOTE — PATIENT INSTRUCTIONS
Follow up with Dr Sr in 3 months and in September with an echo     Chest X ray soon     Labs soon     Start Metformin 500mg twice daily. Start Wellbutrin 150mg twice daily     Smaller, more frequent meals all day.  Do not lay flat for a couple hours following your meals.     Call for any questions or concerns.

## 2024-01-26 ENCOUNTER — TELEPHONE (OUTPATIENT)
Dept: CARDIOLOGY CLINIC | Age: 39
End: 2024-01-26

## 2024-02-21 ENCOUNTER — TELEPHONE (OUTPATIENT)
Dept: CARDIOLOGY CLINIC | Age: 39
End: 2024-02-21

## 2024-02-21 NOTE — TELEPHONE ENCOUNTER
We received remote transmission from patient's monitor at home. Transmission shows normal sensing and pacing function.   Current ECG shows ASVS at ~ 75 bpm  Since 1/5/2024;  1 NSVT episode noted on 1/15/2024 lasting 3 seconds.   4 Fast A&V episodes noted. SVT-ST-Last on 2/15/2024 lasting 1 1/2 minutes   PVC Runs (2-4 beats) 3.6 per hour PVC Singles 13.7 per hour  EP will review.   See interrogation in [facility] or [facility] for more details.

## 2024-02-21 NOTE — TELEPHONE ENCOUNTER
Pt states she is having non stop heart fluttering since yesterday. States she is SOB and fatigued. States she is sending a manual transmission. Please call to advise

## 2024-02-21 NOTE — TELEPHONE ENCOUNTER
I spoke with pt and  she stated that she has a burning pain in the chest. She states that she has a fluttery feeling. She states that she is coughing- non-productive.tingling in hand and feet for months MXA aware.She denies any sinus issues.

## 2024-04-01 ENCOUNTER — PATIENT MESSAGE (OUTPATIENT)
Dept: CARDIOLOGY CLINIC | Age: 39
End: 2024-04-01

## 2024-04-01 RX ORDER — METOPROLOL SUCCINATE 100 MG/1
150 TABLET, EXTENDED RELEASE ORAL DAILY
Qty: 135 TABLET | Refills: 1 | Status: SHIPPED | OUTPATIENT
Start: 2024-04-01

## 2024-04-01 NOTE — TELEPHONE ENCOUNTER
From: Robyn López  To: Dr. Orville Manuel  Sent: 4/1/2024 9:07 AM EDT  Subject: Fluttering     Hello Mariajose been feeling fluttering almost everyday Im sending transmission over now thanks

## 2024-04-01 NOTE — TELEPHONE ENCOUNTER
Refill request METOPROLOL ER SUCCINATE 100MG TABS     Last OV:24 WAK    Last EK23     Next Appt:24 MXA    metoprolol succinate (TOPROL XL) 100 MG extended release tablet [[id number]]    Order Details  Dose: 150 mg Route: Oral Frequency: DAILY   Dispense Quantity: 135 tablet Refills: 1          Sig: Take 1.5 tablets by mouth daily   Patient taking differently: Take 1.5 tablets by mouth daily 100 mg daily         Start Date: 23 End Date: --   Written Date: 23 Expiration Date: 24   Original Order: metoprolol succinate (TOPROL XL) 100 MG extended release tablet [[id number]]   Providers    Authorizing Provider: Jose A Sr MD  NPI: [id number]   Ordering User: Minal Link RN    Cosigner: Jose A Sr MD Signed: 2023 11:20

## 2024-04-04 ENCOUNTER — OFFICE VISIT (OUTPATIENT)
Dept: CARDIOLOGY CLINIC | Age: 39
End: 2024-04-04
Payer: COMMERCIAL

## 2024-04-04 ENCOUNTER — NURSE ONLY (OUTPATIENT)
Dept: CARDIOLOGY CLINIC | Age: 39
End: 2024-04-04
Payer: COMMERCIAL

## 2024-04-04 VITALS
SYSTOLIC BLOOD PRESSURE: 124 MMHG | BODY MASS INDEX: 43.4 KG/M2 | DIASTOLIC BLOOD PRESSURE: 76 MMHG | WEIGHT: 293 LBS | HEIGHT: 69 IN | HEART RATE: 67 BPM | OXYGEN SATURATION: 100 %

## 2024-04-04 DIAGNOSIS — I50.32 CHRONIC DIASTOLIC HEART FAILURE (HCC): ICD-10-CM

## 2024-04-04 DIAGNOSIS — I44.2 CHB (COMPLETE HEART BLOCK) (HCC): ICD-10-CM

## 2024-04-04 DIAGNOSIS — I47.29 NSVT (NONSUSTAINED VENTRICULAR TACHYCARDIA) (HCC): ICD-10-CM

## 2024-04-04 DIAGNOSIS — Q24.9 CONGENITAL HEART DISEASE: ICD-10-CM

## 2024-04-04 DIAGNOSIS — Z95.0 PACEMAKER: Primary | ICD-10-CM

## 2024-04-04 DIAGNOSIS — Z95.0 PACEMAKER: ICD-10-CM

## 2024-04-04 DIAGNOSIS — I48.0 PAF (PAROXYSMAL ATRIAL FIBRILLATION) (HCC): ICD-10-CM

## 2024-04-04 DIAGNOSIS — I48.0 PAF (PAROXYSMAL ATRIAL FIBRILLATION) (HCC): Primary | ICD-10-CM

## 2024-04-04 DIAGNOSIS — I47.20 VT (VENTRICULAR TACHYCARDIA) (HCC): ICD-10-CM

## 2024-04-04 DIAGNOSIS — I48.3 TYPICAL ATRIAL FLUTTER (HCC): ICD-10-CM

## 2024-04-04 DIAGNOSIS — I47.19 ATRIAL TACHYCARDIA (HCC): ICD-10-CM

## 2024-04-04 PROCEDURE — 93280 PM DEVICE PROGR EVAL DUAL: CPT | Performed by: INTERNAL MEDICINE

## 2024-04-04 PROCEDURE — 99214 OFFICE O/P EST MOD 30 MIN: CPT | Performed by: INTERNAL MEDICINE

## 2024-04-04 PROCEDURE — 93000 ELECTROCARDIOGRAM COMPLETE: CPT | Performed by: INTERNAL MEDICINE

## 2024-04-04 NOTE — PROGRESS NOTES
succinate (TOPROL XL) 100 MG extended release tablet Take 1.5 tablets by mouth daily 100 mg daily 4/1/24   Jose A Sr MD   metFORMIN (GLUCOPHAGE) 500 MG tablet Take 1 tablet by mouth 2 times daily (with meals) 1/12/24   Jose A Sr MD   sotalol (BETAPACE) 120 MG tablet Take 1 tablet by mouth 2 times daily 1/12/24   Jose A Sr MD   buPROPion (WELLBUTRIN XL) 150 MG extended release tablet Take 1 tablet by mouth 2 times daily 1/12/24   Jose A Sr MD   omeprazole (PRILOSEC) 20 MG delayed release capsule Take 1 capsule by mouth every morning (before breakfast)  Patient taking differently: Take 1 capsule by mouth every morning (before breakfast) 40 mg daily 12/2/23   Jose Vásquez PA   ibuprofen (ADVIL;MOTRIN) 600 MG tablet Take 1 tablet by mouth every 6 hours as needed for Pain 12/2/23   Jose Vásquez PA   montelukast (SINGULAIR) 10 MG tablet Take 1 tablet by mouth daily 9/19/23   Jacy Carrera APRN - CNP   loratadine (CLARITIN) 10 MG tablet Take 1 tablet by mouth daily 9/19/23   Jacy Carrera APRN - CNP   torsemide (DEMADEX) 20 MG tablet Take 1 tablet by mouth in the morning and at bedtime 7/26/23   Jose A Sr MD   topiramate (TOPAMAX) 100 MG tablet Take by mouth 2 times daily For Migraines    ProviderSilvana MD   albuterol sulfate HFA (PROVENTIL HFA) 108 (90 Base) MCG/ACT inhaler Inhale 2 puffs into the lungs every 6 hours as needed for Wheezing 12/12/21   Jf Segal MD   aspirin 81 MG tablet Take 1 tablet by mouth daily    Provider, MD Silvana       Allergies   Allergen Reactions    Caffeine Hives, Palpitations and Other (See Comments)     Other reaction(s): Other (See Comments)  migraine  headaches      Red Dye Hives, Palpitations and Headaches     Other reaction(s): Other (See Comments)  headaches      Other      Migraines     Social History:  Reviewed.  reports that she has never smoked. She has never used smokeless tobacco.

## 2024-04-10 NOTE — PROGRESS NOTES
[facility]      CARDIAC FOLLOW UP  [phone]  4/15/24  Referring:     REASON FOR CONSULT/CHIEF COMPLAINT/HPI     Reason for visit/ Chief complaint  Congenital Heart Disease   HPI Robyn López is a 38 y.o. female patient here for follow up.     She has complex congenital heart disease:     Double outlet right ventrilce  1985 - Complete DORV repair by transatrial intraventricular baffle closure of ventricular septal defect such that left ventricle ejects into aorta and right ventricle into pulmonary artery at [facility] by Dr. Zacarias Lara   08/19/2000 - Division of right ventricular outflow muscle bundles at [facility] by Dr. Greg Fam   atrial tachycardia/fibrillation, and paroxysmal complete AV block  (2019) pacemaker insertion by Dr. Orville Manuel.    I am co-managing her care along with Dr. Deepika Martinez at the [facility] program at [facility], and also my EP partner Dr. Manuel.  I last saw her 3 months ago.    Today, Robyn is here for a follow up.  Has lost about 40 lbs with diet and exercise.  Wegovy was approved (but not in stock) so she actually never got it, but now that she has lost a lot of weight on her own she wants to try to continue weight on her home.    Her anxiety has also improved.  She was prescribed zoloft by her PCP, but hasn't taken it.    She is here with her 4-year-old son today.    She has a had a total overhaul of her diet.  She recently splurged and ate some wings at [employer] and had some arm tingling after that; thought that might have been due to the salt load, so she quit taking it.    She notes she felt very poorly after iron infusions before and had chest pain requiring an ER visit, so she would like to avoid that.    She hasn't had PFTs nor a GI referral yet.  She gets a lot of \"belching\" sensation and a lot of reflex.  She tried omeprazole for a while but her symptoms got worse.    She is planning to get a cardiac MRI at [facility] [place] this summer, but it isn't scheduled

## 2024-04-15 ENCOUNTER — OFFICE VISIT (OUTPATIENT)
Dept: CARDIOLOGY CLINIC | Age: 39
End: 2024-04-15
Payer: COMMERCIAL

## 2024-04-15 VITALS
HEIGHT: 69 IN | DIASTOLIC BLOOD PRESSURE: 60 MMHG | WEIGHT: 293 LBS | OXYGEN SATURATION: 97 % | BODY MASS INDEX: 43.4 KG/M2 | SYSTOLIC BLOOD PRESSURE: 92 MMHG | HEART RATE: 73 BPM

## 2024-04-15 DIAGNOSIS — I44.2 CHB (COMPLETE HEART BLOCK) (HCC): ICD-10-CM

## 2024-04-15 DIAGNOSIS — Z95.0 PACEMAKER: ICD-10-CM

## 2024-04-15 DIAGNOSIS — I47.19 ATRIAL TACHYCARDIA (HCC): ICD-10-CM

## 2024-04-15 DIAGNOSIS — K21.9 GASTROESOPHAGEAL REFLUX DISEASE WITHOUT ESOPHAGITIS: ICD-10-CM

## 2024-04-15 DIAGNOSIS — R06.89 DYSPNEA AND RESPIRATORY ABNORMALITIES: ICD-10-CM

## 2024-04-15 DIAGNOSIS — I50.32 CHRONIC DIASTOLIC HEART FAILURE (HCC): ICD-10-CM

## 2024-04-15 DIAGNOSIS — R71.8 LOW MEAN CORPUSCULAR VOLUME (MCV): ICD-10-CM

## 2024-04-15 DIAGNOSIS — R68.89 COLD INTOLERANCE: ICD-10-CM

## 2024-04-15 DIAGNOSIS — I48.3 TYPICAL ATRIAL FLUTTER (HCC): ICD-10-CM

## 2024-04-15 DIAGNOSIS — Q24.9 CONGENITAL HEART DISEASE: Primary | ICD-10-CM

## 2024-04-15 DIAGNOSIS — R06.00 DYSPNEA AND RESPIRATORY ABNORMALITIES: ICD-10-CM

## 2024-04-15 DIAGNOSIS — I36.1 NONRHEUMATIC TRICUSPID VALVE REGURGITATION: ICD-10-CM

## 2024-04-15 PROCEDURE — G2211 COMPLEX E/M VISIT ADD ON: HCPCS | Performed by: INTERNAL MEDICINE

## 2024-04-15 PROCEDURE — 99417 PROLNG OP E/M EACH 15 MIN: CPT | Performed by: INTERNAL MEDICINE

## 2024-04-15 PROCEDURE — 99215 OFFICE O/P EST HI 40 MIN: CPT | Performed by: INTERNAL MEDICINE

## 2024-04-15 NOTE — PATIENT INSTRUCTIONS
Increase your metoprolol xl from 50 to 150 over the next few weeks  Start 100 tomorrow - do that for at least a week before you up the dose to 150    GI referral placed   Labs today - Hemoglobinopathy evaluation, TSH     Follow up with Dr. Sr in 3 months

## 2024-04-26 ENCOUNTER — HOSPITAL ENCOUNTER (OUTPATIENT)
Dept: PULMONOLOGY | Age: 39
Discharge: HOME | End: 2024-04-26
Payer: COMMERCIAL

## 2024-04-26 VITALS — RESPIRATION RATE: 16 BRPM | OXYGEN SATURATION: 98 % | HEART RATE: 68 BPM

## 2024-04-26 DIAGNOSIS — R06.00 DYSPNEA AND RESPIRATORY ABNORMALITIES: ICD-10-CM

## 2024-04-26 DIAGNOSIS — R06.89 DYSPNEA AND RESPIRATORY ABNORMALITIES: ICD-10-CM

## 2024-04-26 LAB
DLCO %PRED: 67 %
DLCO PRED: NORMAL
DLCO/VA %PRED: NORMAL
DLCO/VA PRED: NORMAL
DLCO/VA: NORMAL
DLCO: NORMAL
EXPIRATORY TIME-POST: NORMAL
EXPIRATORY TIME: NORMAL
FEF 25-75 %CHNG: NORMAL
FEF 25-75 POST %PRED: NORMAL
FEF 25-75% %PRED-PRE: NORMAL
FEF 25-75% PRED: NORMAL
FEF 25-75-POST: NORMAL
FEF 25-75-PRE: NORMAL
FEV1 %PRED-POST: NORMAL
FEV1 %PRED-PRE: 91 %
FEV1 PRED: NORMAL
FEV1-POST: NORMAL
FEV1-PRE: NORMAL
FEV1/FVC %PRED-POST: NORMAL
FEV1/FVC %PRED-PRE: NORMAL
FEV1/FVC PRED: NORMAL
FEV1/FVC-POST: NORMAL
FEV1/FVC-PRE: 76 %
FVC %PRED-POST: NORMAL
FVC %PRED-PRE: NORMAL
FVC PRED: NORMAL
FVC-POST: NORMAL
FVC-PRE: NORMAL
GAW %PRED: NORMAL
GAW PRED: NORMAL
GAW: NORMAL
IC PRE %PRED: NORMAL
IC PRED: NORMAL
IC: NORMAL
MEP: NORMAL
MIP: NORMAL
MVV %PRED-PRE: NORMAL
MVV PRED: NORMAL
MVV-PRE: NORMAL
PEF %PRED-POST: NORMAL
PEF %PRED-PRE: NORMAL
PEF PRED: NORMAL
PEF%CHNG: NORMAL
PEF-POST: NORMAL
PEF-PRE: NORMAL
RAW %PRED: NORMAL
RAW PRED: NORMAL
RAW: NORMAL
RV PRE %PRED: NORMAL
RV PRED: NORMAL
RV: NORMAL
SVC %PRED: NORMAL
SVC PRED: NORMAL
SVC: NORMAL
TLC PRE %PRED: 83 %
TLC PRED: NORMAL
TLC: NORMAL
VA %PRED: NORMAL
VA PRED: NORMAL
VA: NORMAL
VTG %PRED: NORMAL
VTG PRED: NORMAL
VTG: NORMAL

## 2024-04-26 PROCEDURE — 94760 N-INVAS EAR/PLS OXIMETRY 1: CPT

## 2024-04-26 PROCEDURE — 94729 DIFFUSING CAPACITY: CPT

## 2024-04-26 PROCEDURE — 94726 PLETHYSMOGRAPHY LUNG VOLUMES: CPT

## 2024-04-26 PROCEDURE — 94010 BREATHING CAPACITY TEST: CPT

## 2024-04-26 RX ORDER — ALBUTEROL SULFATE 90 UG/1
4 AEROSOL, METERED RESPIRATORY (INHALATION) ONCE
Status: CANCELLED | OUTPATIENT
Start: 2024-04-26

## 2024-04-26 ASSESSMENT — PULMONARY FUNCTION TESTS
FEV1_PERCENT_PREDICTED_PRE: 91
FEV1/FVC_PRE: 76

## 2024-04-30 ENCOUNTER — TELEPHONE (OUTPATIENT)
Age: 39
End: 2024-04-30

## 2024-04-30 NOTE — TELEPHONE ENCOUNTER
----- Message from Jose A Sr MD sent at 4/30/2024  9:18 AM EDT -----  PFTs normal except for mildly decreased diffusing capacity, which is likely due to obesity

## 2024-06-07 ENCOUNTER — TELEPHONE (OUTPATIENT)
Dept: CARDIOLOGY CLINIC | Age: 39
End: 2024-06-07

## 2024-06-07 NOTE — TELEPHONE ENCOUNTER
Pt just sent a transmission.  She is feeling fluttery and shaky.    Symptoms have been happening for the last couple of days.    She can feel pain in the pace maker area, it is itchy and she feels a discomfort  She is having just a bit of sob while talking.    Please advise

## 2024-06-08 ENCOUNTER — APPOINTMENT (OUTPATIENT)
Dept: GENERAL RADIOLOGY | Age: 39
DRG: 206 | End: 2024-06-08
Payer: COMMERCIAL

## 2024-06-08 PROCEDURE — 93005 ELECTROCARDIOGRAM TRACING: CPT | Performed by: EMERGENCY MEDICINE

## 2024-06-08 PROCEDURE — 99285 EMERGENCY DEPT VISIT HI MDM: CPT

## 2024-06-08 ASSESSMENT — PAIN - FUNCTIONAL ASSESSMENT: PAIN_FUNCTIONAL_ASSESSMENT: 0-10

## 2024-06-08 ASSESSMENT — PAIN SCALES - GENERAL: PAINLEVEL_OUTOF10: 3

## 2024-06-09 ENCOUNTER — APPOINTMENT (OUTPATIENT)
Dept: GENERAL RADIOLOGY | Age: 39
DRG: 206 | End: 2024-06-09
Payer: COMMERCIAL

## 2024-06-09 ENCOUNTER — HOSPITAL ENCOUNTER (INPATIENT)
Age: 39
LOS: 1 days | Discharge: HOME | DRG: 206 | End: 2024-06-11
Attending: EMERGENCY MEDICINE | Admitting: STUDENT IN AN ORGANIZED HEALTH CARE EDUCATION/TRAINING PROGRAM
Payer: COMMERCIAL

## 2024-06-09 DIAGNOSIS — R94.31 ACUTE ELECTROCARDIOGRAM CHANGES: ICD-10-CM

## 2024-06-09 DIAGNOSIS — E83.39 HYPOPHOSPHATASIA: ICD-10-CM

## 2024-06-09 DIAGNOSIS — E87.6 HYPOKALEMIA: ICD-10-CM

## 2024-06-09 DIAGNOSIS — E83.42 HYPOMAGNESEMIA: ICD-10-CM

## 2024-06-09 DIAGNOSIS — R07.9 CHEST PAIN, UNSPECIFIED TYPE: Primary | ICD-10-CM

## 2024-06-09 DIAGNOSIS — Q24.9 CONGENITAL HEART DISEASE: ICD-10-CM

## 2024-06-09 DIAGNOSIS — R00.2 PALPITATIONS: ICD-10-CM

## 2024-06-09 LAB
ANION GAP SERPL CALCULATED.3IONS-SCNC: 15 MMOL/L (ref 3–16)
BASOPHILS # BLD: 0.1 K/UL (ref 0–0.2)
BASOPHILS NFR BLD: 0.9 %
BUN SERPL-MCNC: 15 MG/DL (ref 7–20)
CALCIUM SERPL-MCNC: 9.9 MG/DL (ref 8.3–10.6)
CHLORIDE SERPL-SCNC: 101 MMOL/L (ref 99–110)
CO2 SERPL-SCNC: 22 MMOL/L (ref 21–32)
CREAT SERPL-MCNC: 0.9 MG/DL (ref 0.6–1.1)
DEPRECATED RDW RBC AUTO: 13.1 % (ref 12.4–15.4)
EKG ATRIAL RATE: 75 BPM
EKG ATRIAL RATE: 87 BPM
EKG DIAGNOSIS: NORMAL
EKG P AXIS: 43 DEGREES
EKG P AXIS: 64 DEGREES
EKG P-R INTERVAL: 168 MS
EKG P-R INTERVAL: 172 MS
EKG Q-T INTERVAL: 420 MS
EKG Q-T INTERVAL: 442 MS
EKG QRS DURATION: 140 MS
EKG QRS DURATION: 146 MS
EKG QTC CALCULATION (BAZETT): 493 MS
EKG QTC CALCULATION (BAZETT): 505 MS
EKG R AXIS: -2 DEGREES
EKG R AXIS: 1 DEGREES
EKG T AXIS: 17 DEGREES
EKG T AXIS: 38 DEGREES
EKG VENTRICULAR RATE: 75 BPM
EKG VENTRICULAR RATE: 87 BPM
EOSINOPHIL # BLD: 0.3 K/UL (ref 0–0.6)
EOSINOPHIL NFR BLD: 3.7 %
GFR SERPLBLD CREATININE-BSD FMLA CKD-EPI: 83 ML/MIN/{1.73_M2}
GLUCOSE SERPL-MCNC: 102 MG/DL (ref 70–99)
HCT VFR BLD AUTO: 40.4 % (ref 36–48)
HGB BLD-MCNC: 13.5 G/DL (ref 12–16)
INR PPP: 1.05 (ref 0.85–1.15)
LYMPHOCYTES # BLD: 2 K/UL (ref 1–5.1)
LYMPHOCYTES NFR BLD: 30.6 %
MAGNESIUM SERPL-MCNC: 2.1 MG/DL (ref 1.8–2.4)
MCH RBC QN AUTO: 25.5 PG (ref 26–34)
MCHC RBC AUTO-ENTMCNC: 33.4 G/DL (ref 31–36)
MCV RBC AUTO: 76.5 FL (ref 80–100)
MONOCYTES # BLD: 0.6 K/UL (ref 0–1.3)
MONOCYTES NFR BLD: 9.3 %
NEUTROPHILS # BLD: 3.7 K/UL (ref 1.7–7.7)
NEUTROPHILS NFR BLD: 55.5 %
NT-PROBNP SERPL-MCNC: 59 PG/ML (ref 0–124)
PHOSPHATE SERPL-MCNC: 2.4 MG/DL (ref 2.5–4.9)
PLATELET # BLD AUTO: 337 K/UL (ref 135–450)
PMV BLD AUTO: 8.3 FL (ref 5–10.5)
POTASSIUM SERPL-SCNC: 3.2 MMOL/L (ref 3.5–5.1)
PROTHROMBIN TIME: 13.9 SEC (ref 11.9–14.9)
RBC # BLD AUTO: 5.28 M/UL (ref 4–5.2)
SODIUM SERPL-SCNC: 138 MMOL/L (ref 136–145)
TROPONIN, HIGH SENSITIVITY: 6 NG/L (ref 0–14)
TROPONIN, HIGH SENSITIVITY: 7 NG/L (ref 0–14)
TSH SERPL DL<=0.005 MIU/L-ACNC: 1.59 UIU/ML (ref 0.27–4.2)
WBC # BLD AUTO: 6.7 K/UL (ref 4–11)

## 2024-06-09 PROCEDURE — 71045 X-RAY EXAM CHEST 1 VIEW: CPT

## 2024-06-09 PROCEDURE — 85025 COMPLETE CBC W/AUTO DIFF WBC: CPT

## 2024-06-09 PROCEDURE — G0378 HOSPITAL OBSERVATION PER HR: HCPCS

## 2024-06-09 PROCEDURE — 2580000003 HC RX 258: Performed by: STUDENT IN AN ORGANIZED HEALTH CARE EDUCATION/TRAINING PROGRAM

## 2024-06-09 PROCEDURE — 36415 COLL VENOUS BLD VENIPUNCTURE: CPT

## 2024-06-09 PROCEDURE — 85610 PROTHROMBIN TIME: CPT

## 2024-06-09 PROCEDURE — 6370000000 HC RX 637 (ALT 250 FOR IP): Performed by: STUDENT IN AN ORGANIZED HEALTH CARE EDUCATION/TRAINING PROGRAM

## 2024-06-09 PROCEDURE — 80048 BASIC METABOLIC PNL TOTAL CA: CPT

## 2024-06-09 PROCEDURE — 84443 ASSAY THYROID STIM HORMONE: CPT

## 2024-06-09 PROCEDURE — 99223 1ST HOSP IP/OBS HIGH 75: CPT | Performed by: INTERNAL MEDICINE

## 2024-06-09 PROCEDURE — 6370000000 HC RX 637 (ALT 250 FOR IP): Performed by: NURSE PRACTITIONER

## 2024-06-09 PROCEDURE — 83735 ASSAY OF MAGNESIUM: CPT

## 2024-06-09 PROCEDURE — 84100 ASSAY OF PHOSPHORUS: CPT

## 2024-06-09 PROCEDURE — 6370000000 HC RX 637 (ALT 250 FOR IP): Performed by: EMERGENCY MEDICINE

## 2024-06-09 PROCEDURE — 84484 ASSAY OF TROPONIN QUANT: CPT

## 2024-06-09 PROCEDURE — 82607 VITAMIN B-12: CPT

## 2024-06-09 PROCEDURE — 93005 ELECTROCARDIOGRAM TRACING: CPT | Performed by: EMERGENCY MEDICINE

## 2024-06-09 PROCEDURE — 83880 ASSAY OF NATRIURETIC PEPTIDE: CPT

## 2024-06-09 PROCEDURE — 82746 ASSAY OF FOLIC ACID SERUM: CPT

## 2024-06-09 PROCEDURE — 6370000000 HC RX 637 (ALT 250 FOR IP): Performed by: INTERNAL MEDICINE

## 2024-06-09 RX ORDER — ONDANSETRON 4 MG/1
4 TABLET, ORALLY DISINTEGRATING ORAL EVERY 8 HOURS PRN
Status: DISCONTINUED | OUTPATIENT
Start: 2024-06-09 | End: 2024-06-11 | Stop reason: HOSPADM

## 2024-06-09 RX ORDER — POLYETHYLENE GLYCOL 3350 17 G/17G
17 POWDER, FOR SOLUTION ORAL DAILY PRN
Status: DISCONTINUED | OUTPATIENT
Start: 2024-06-09 | End: 2024-06-11 | Stop reason: HOSPADM

## 2024-06-09 RX ORDER — ACETAMINOPHEN 325 MG/1
650 TABLET ORAL EVERY 6 HOURS PRN
Status: DISCONTINUED | OUTPATIENT
Start: 2024-06-09 | End: 2024-06-11 | Stop reason: HOSPADM

## 2024-06-09 RX ORDER — POTASSIUM CHLORIDE 7.45 MG/ML
10 INJECTION INTRAVENOUS PRN
Status: DISCONTINUED | OUTPATIENT
Start: 2024-06-09 | End: 2024-06-11 | Stop reason: HOSPADM

## 2024-06-09 RX ORDER — SPIRONOLACTONE 25 MG/1
25 TABLET ORAL DAILY
Status: DISCONTINUED | OUTPATIENT
Start: 2024-06-09 | End: 2024-06-11 | Stop reason: HOSPADM

## 2024-06-09 RX ORDER — SODIUM CHLORIDE 0.9 % (FLUSH) 0.9 %
5-40 SYRINGE (ML) INJECTION EVERY 12 HOURS SCHEDULED
Status: DISCONTINUED | OUTPATIENT
Start: 2024-06-09 | End: 2024-06-11 | Stop reason: HOSPADM

## 2024-06-09 RX ORDER — ASPIRIN 81 MG/1
243 TABLET, CHEWABLE ORAL ONCE
Status: COMPLETED | OUTPATIENT
Start: 2024-06-09 | End: 2024-06-09

## 2024-06-09 RX ORDER — PANTOPRAZOLE SODIUM 40 MG/1
40 TABLET, DELAYED RELEASE ORAL
Status: DISCONTINUED | OUTPATIENT
Start: 2024-06-09 | End: 2024-06-11 | Stop reason: HOSPADM

## 2024-06-09 RX ORDER — SODIUM CHLORIDE 0.9 % (FLUSH) 0.9 %
5-40 SYRINGE (ML) INJECTION PRN
Status: DISCONTINUED | OUTPATIENT
Start: 2024-06-09 | End: 2024-06-11 | Stop reason: HOSPADM

## 2024-06-09 RX ORDER — POTASSIUM CHLORIDE 20 MEQ/1
40 TABLET, EXTENDED RELEASE ORAL PRN
Status: DISCONTINUED | OUTPATIENT
Start: 2024-06-09 | End: 2024-06-11 | Stop reason: HOSPADM

## 2024-06-09 RX ORDER — ATORVASTATIN CALCIUM 40 MG/1
40 TABLET, FILM COATED ORAL NIGHTLY
Status: DISCONTINUED | OUTPATIENT
Start: 2024-06-09 | End: 2024-06-11 | Stop reason: HOSPADM

## 2024-06-09 RX ORDER — TORSEMIDE 20 MG/1
20 TABLET ORAL 2 TIMES DAILY
Status: DISCONTINUED | OUTPATIENT
Start: 2024-06-09 | End: 2024-06-11 | Stop reason: HOSPADM

## 2024-06-09 RX ORDER — MONTELUKAST SODIUM 10 MG/1
10 TABLET ORAL DAILY
Status: DISCONTINUED | OUTPATIENT
Start: 2024-06-09 | End: 2024-06-09

## 2024-06-09 RX ORDER — ONDANSETRON 2 MG/ML
4 INJECTION INTRAMUSCULAR; INTRAVENOUS EVERY 6 HOURS PRN
Status: DISCONTINUED | OUTPATIENT
Start: 2024-06-09 | End: 2024-06-11 | Stop reason: HOSPADM

## 2024-06-09 RX ORDER — ACETAMINOPHEN 650 MG/1
650 SUPPOSITORY RECTAL EVERY 6 HOURS PRN
Status: DISCONTINUED | OUTPATIENT
Start: 2024-06-09 | End: 2024-06-11 | Stop reason: HOSPADM

## 2024-06-09 RX ORDER — TOPIRAMATE 25 MG/1
50 TABLET ORAL 2 TIMES DAILY
Status: DISCONTINUED | OUTPATIENT
Start: 2024-06-09 | End: 2024-06-11 | Stop reason: HOSPADM

## 2024-06-09 RX ORDER — SOTALOL HYDROCHLORIDE 80 MG/1
120 TABLET ORAL 2 TIMES DAILY
Status: DISCONTINUED | OUTPATIENT
Start: 2024-06-09 | End: 2024-06-11

## 2024-06-09 RX ORDER — METOPROLOL SUCCINATE 50 MG/1
100 TABLET, EXTENDED RELEASE ORAL DAILY
Status: DISCONTINUED | OUTPATIENT
Start: 2024-06-09 | End: 2024-06-10

## 2024-06-09 RX ORDER — POTASSIUM CHLORIDE 20 MEQ/1
20 TABLET, EXTENDED RELEASE ORAL ONCE
Status: COMPLETED | OUTPATIENT
Start: 2024-06-09 | End: 2024-06-09

## 2024-06-09 RX ORDER — ENOXAPARIN SODIUM 100 MG/ML
40 INJECTION SUBCUTANEOUS DAILY
Status: DISCONTINUED | OUTPATIENT
Start: 2024-06-09 | End: 2024-06-11 | Stop reason: HOSPADM

## 2024-06-09 RX ORDER — BUPROPION HYDROCHLORIDE 150 MG/1
150 TABLET ORAL 2 TIMES DAILY
Status: DISCONTINUED | OUTPATIENT
Start: 2024-06-09 | End: 2024-06-09

## 2024-06-09 RX ORDER — MAGNESIUM SULFATE IN WATER 40 MG/ML
2000 INJECTION, SOLUTION INTRAVENOUS PRN
Status: DISCONTINUED | OUTPATIENT
Start: 2024-06-09 | End: 2024-06-11 | Stop reason: HOSPADM

## 2024-06-09 RX ORDER — SODIUM CHLORIDE 9 MG/ML
INJECTION, SOLUTION INTRAVENOUS PRN
Status: DISCONTINUED | OUTPATIENT
Start: 2024-06-09 | End: 2024-06-11 | Stop reason: HOSPADM

## 2024-06-09 RX ORDER — ASPIRIN 81 MG/1
81 TABLET ORAL DAILY
Status: DISCONTINUED | OUTPATIENT
Start: 2024-06-09 | End: 2024-06-11 | Stop reason: HOSPADM

## 2024-06-09 RX ORDER — NITROGLYCERIN 0.4 MG/1
0.4 TABLET SUBLINGUAL ONCE
Status: DISCONTINUED | OUTPATIENT
Start: 2024-06-09 | End: 2024-06-11 | Stop reason: HOSPADM

## 2024-06-09 RX ORDER — ALBUTEROL SULFATE 90 UG/1
2 AEROSOL, METERED RESPIRATORY (INHALATION) EVERY 6 HOURS PRN
Status: DISCONTINUED | OUTPATIENT
Start: 2024-06-09 | End: 2024-06-11 | Stop reason: HOSPADM

## 2024-06-09 RX ADMIN — TORSEMIDE 20 MG: 20 TABLET ORAL at 09:00

## 2024-06-09 RX ADMIN — POTASSIUM CHLORIDE 20 MEQ: 1500 TABLET, EXTENDED RELEASE ORAL at 01:40

## 2024-06-09 RX ADMIN — TORSEMIDE 20 MG: 20 TABLET ORAL at 19:34

## 2024-06-09 RX ADMIN — TOPIRAMATE 50 MG: 25 TABLET, FILM COATED ORAL at 19:34

## 2024-06-09 RX ADMIN — SODIUM CHLORIDE, PRESERVATIVE FREE 10 ML: 5 INJECTION INTRAVENOUS at 19:35

## 2024-06-09 RX ADMIN — SODIUM CHLORIDE, PRESERVATIVE FREE 10 ML: 5 INJECTION INTRAVENOUS at 09:03

## 2024-06-09 RX ADMIN — TOPIRAMATE 25 MG: 25 TABLET, FILM COATED ORAL at 08:58

## 2024-06-09 RX ADMIN — Medication 1000 MG: at 01:39

## 2024-06-09 RX ADMIN — SOTALOL HYDROCHLORIDE 120 MG: 80 TABLET ORAL at 09:01

## 2024-06-09 RX ADMIN — ASPIRIN 243 MG: 81 TABLET, CHEWABLE ORAL at 01:11

## 2024-06-09 RX ADMIN — SPIRONOLACTONE 25 MG: 25 TABLET ORAL at 15:53

## 2024-06-09 RX ADMIN — METOPROLOL SUCCINATE 100 MG: 50 TABLET, EXTENDED RELEASE ORAL at 09:00

## 2024-06-09 RX ADMIN — ACETAMINOPHEN 650 MG: 325 TABLET ORAL at 12:17

## 2024-06-09 RX ADMIN — ASPIRIN 81 MG: 81 TABLET, COATED ORAL at 09:01

## 2024-06-09 RX ADMIN — SOTALOL HYDROCHLORIDE 120 MG: 80 TABLET ORAL at 19:34

## 2024-06-09 ASSESSMENT — PAIN DESCRIPTION - LOCATION: LOCATION: CHEST

## 2024-06-09 ASSESSMENT — PAIN SCALES - WONG BAKER: WONGBAKER_NUMERICALRESPONSE: NO HURT

## 2024-06-09 ASSESSMENT — PAIN SCALES - GENERAL
PAINLEVEL_OUTOF10: 3
PAINLEVEL_OUTOF10: 0

## 2024-06-09 ASSESSMENT — PAIN DESCRIPTION - DESCRIPTORS: DESCRIPTORS: ACHING

## 2024-06-09 NOTE — ED PROVIDER NOTES
No   Exclusion criteria - the patient is NOT to be included for SEP-1 Core Measure due to:  Infection is not suspected    CONSULTS: (Who and What was discussed)  None  Discussion with Other Profesionals : Admitting Team dr. peck    Social Determinants : None    Records Reviewed : Outpatient Notes Dr. HOPKINS, outpatient, 4/15/2024, patient was seen with history of complex congenital heart disease with double outlet right ventricle, atrial tachycardia/fibrillation and paroxysmal complete AV block, pacemaker insertion by Dr. Manuel.  Patient is comanaged by Dr. Deepika Martinez at the [facility] program in [facility] as well as EP partner, Dr. Manuel.      CC/HPI Summary, DDx, ED Course, and Reassessment:     Briefly, this is a very pleasant 38-year-old female who presents to the emergency department with complaint of palpitations, shortness of breath, and fatigue with some chest pain to the left side of her chest over the pacemaker site intermittently since Friday.  No mitigating exacerbating factors.  The patient did transmit AICD pacemaker but has not heard back regarding the transmission from Friday.    She does have complicated cardiac congenital history.    Potassium is 3.2, phosphorus 2.4, labs are otherwise unremarkable with negative troponins x 2.  Chest x-ray shows no acute pulmonary disease.  Aspirin 324 mg given.  The patient did refuse nitroglycerin.    She does have acute EKG changes with lateral T wave inversions when compared to EKG from just last month.  The patient will be admitted to hospital services with acute EKG changes, chest pain, palpitations    Disposition Considerations (include 1 Tests not done, Shared Decision Making, Pt Expectation of Test or Tx.): Shared decision making: Initial differential diagnoses were discussed with this patient, along with physical exam findings and an explanation what evaluation studies were necessary and why. Labs and Imaging results were explained to the patient

## 2024-06-09 NOTE — ED NOTES
Report given to ESPERANZA Ramirez. All questions/concerns addressed, states ready to bring pt up to room 2845

## 2024-06-09 NOTE — H&P
Hospital Medicine History & Physical      Date of Admission: 6/9/2024    Date of Service:  Pt seen/examined on 6/9/24     []Admitted to Inpatient with expected LOS greater than two midnights due to medical therapy.  [x]Placed in Observation status.    Chief Admission Complaint:  Palpitations    Presenting Admission History:      38 y.o. female who presented to [facility] with palpitations.  PMHx significant for double outlet RV s/p repair 1985, division of RVOT muscle bundles 2000, pAF, AV block s/p PM 2019, atrial flutter ablation, obesity.  States that for the past 3 days she has been experiencing palpitations associated with shortness of breath and chest tightness.  She states that they occur at rest and at random and are not necessary associated with exertion.  She denies any fevers, chills, vomiting, diarrhea, recent illness.  She has been having nausea as well as fluctuating body temperatures.  She states it feels like when she went into A-fib in the past.  Over the past 6 or 7 months she has lost over 40 pounds and since then has started to feel sharp pains at the site of her pacemaker.  These are not related to position or movement and also occur at random.  Assessment/Plan:      Current Principal Problem:  Palpitation    Palpitations, likely paroxysmal A-fib versus high PVC/PAC burden   History of atrial flutter status post ablation  AV block status post pacemaker  Congenital heart disease (double outlet RV status postrepair)  Obesity    Plan:  EKG in the ER was normal sinus rhythm with T wave inversions in the lateral leads.  On telemetry during my evaluation patient had frequent ectopy with what seemed like intermittent episodes of atrial fibrillation.  Interrogation of her device will be the most helpful in determining the cause of her palpitations  Continue sotalol and Toprol  Replace electrolytes, keep K greater than 4 and Mg greater than 2  Will obtain 2D echocardiogram to evaluate for any

## 2024-06-09 NOTE — ED NOTES
Pt refusing Nitro at this time, requesting provider speak with pt's cardiologist to make sure it is okay for pt to have. Provider notified.

## 2024-06-09 NOTE — ED NOTES
EKG My Reading:  Rate: 87  Rhythm: sinus  ST Segments: anterior and lateral ST depressions and T wave inversions  STEMI: no  QTc: 505 (prolonged)  RBBB present  Comparison to prior: new lateral ischemic changes and worsened QTc prolongation compared to prior study from 04/04/2024       Hieu Canela MD  06/08/24 0818

## 2024-06-09 NOTE — ED NOTES
Pt brought over to room 08 from [facility] c/o intermittent chest pain and palpitations. AAOx4, NAD, resp e/u on RA, bed locked lowest position, rails up x2, call light within reach, pt on BP cuff Pulse ox and Cardiac monitor. Has pacemaker.

## 2024-06-09 NOTE — PLAN OF CARE
Problem: Discharge Planning  Goal: Discharge to home or other facility with appropriate resources  Outcome: Progressing     Problem: Pain  Goal: Verbalizes/displays adequate comfort level or baseline comfort level  6/9/2024 1401 by Yumiko Walker RN  Outcome: Progressing     Problem: Safety - Adult  Goal: Free from fall injury  6/9/2024 1401 by Yumiko Walker, RN  Outcome: Progressing

## 2024-06-09 NOTE — ED NOTES
How does patient ambulate?   [x]Low Fall Risk (ambulates by themselves without support)  []Stand by assist   []Contact Guard   []Front wheel walker  []Wheelchair   []Steady  []Bed bound  []History of Lower Extremity Amputation  []Unknown, did not assess in the emergency department   How does patient take pills?  [x]Whole with Water  []Crushed in applesauce  []Crushed in pudding  []Other  []Unknown no oral medications were given in the ED  Is patient alert?   [x]Alert  []Drowsy but responds to voice  []Doesn't respond to voice but responds to painful stimuli  []Unresponsive  Is patient oriented?   [x]To person  [x]To place  [x]To time  [x]To situation  []Confused  []Agitated  []Follows commands  If patient is disoriented or from a Skill Nursing Facility has family been notified of admission?   []Yes   [x]No  Patient belongings?   []Cell phone  []Wallet   []Dentures  []Clothing  Any specific patient or family belongings/needs/dynamics?   -  Miscellaneous comments/pending orders?  -     If there are any additional questions please reach out to the Emergency Department.

## 2024-06-10 ENCOUNTER — APPOINTMENT (OUTPATIENT)
Age: 39
DRG: 206 | End: 2024-06-10
Attending: STUDENT IN AN ORGANIZED HEALTH CARE EDUCATION/TRAINING PROGRAM
Payer: COMMERCIAL

## 2024-06-10 PROBLEM — I49.3 PVC (PREMATURE VENTRICULAR CONTRACTION): Status: ACTIVE | Noted: 2024-06-10

## 2024-06-10 PROBLEM — E87.6 HYPOKALEMIA: Status: ACTIVE | Noted: 2024-06-10

## 2024-06-10 LAB
ANION GAP SERPL CALCULATED.3IONS-SCNC: 13 MMOL/L (ref 3–16)
BUN SERPL-MCNC: 11 MG/DL (ref 7–20)
CALCIUM SERPL-MCNC: 9.3 MG/DL (ref 8.3–10.6)
CHLORIDE SERPL-SCNC: 103 MMOL/L (ref 99–110)
CHOLEST SERPL-MCNC: 191 MG/DL (ref 0–199)
CO2 SERPL-SCNC: 19 MMOL/L (ref 21–32)
CREAT SERPL-MCNC: 0.8 MG/DL (ref 0.6–1.1)
DEPRECATED RDW RBC AUTO: 13.2 % (ref 12.4–15.4)
ECHO AV MEAN GRADIENT: 15 MMHG
ECHO AV MEAN VELOCITY: 1.8 M/S
ECHO AV PEAK GRADIENT: 24 MMHG
ECHO AV PEAK VELOCITY: 2.5 M/S
ECHO AV VELOCITY RATIO: 0.48
ECHO AV VTI: 53 CM
ECHO BSA: 2.5 M2
ECHO EST RA PRESSURE: 3 MMHG
ECHO LA AREA 2C: 16.6 CM2
ECHO LA AREA 4C: 15.3 CM2
ECHO LA DIAMETER INDEX: 1.3 CM/M2
ECHO LA DIAMETER: 3.1 CM
ECHO LA MAJOR AXIS: 5.3 CM
ECHO LA MINOR AXIS: 5.3 CM
ECHO LA VOL BP: 37 ML (ref 22–52)
ECHO LA VOL MOD A2C: 41 ML (ref 22–52)
ECHO LA VOL MOD A4C: 34 ML (ref 22–52)
ECHO LA VOL/BSA BIPLANE: 15 ML/M2 (ref 16–34)
ECHO LA VOLUME INDEX MOD A2C: 17 ML/M2 (ref 16–34)
ECHO LA VOLUME INDEX MOD A4C: 14 ML/M2 (ref 16–34)
ECHO LV E' LATERAL VELOCITY: 15 CM/S
ECHO LV E' SEPTAL VELOCITY: 8 CM/S
ECHO LV EDV A2C: 102 ML
ECHO LV EDV A4C: 92 ML
ECHO LV EDV INDEX A4C: 38 ML/M2
ECHO LV EDV NDEX A2C: 43 ML/M2
ECHO LV EJECTION FRACTION A2C: 57 %
ECHO LV EJECTION FRACTION A4C: 55 %
ECHO LV EJECTION FRACTION BIPLANE: 58 % (ref 55–100)
ECHO LV ESV A2C: 44 ML
ECHO LV ESV A4C: 41 ML
ECHO LV ESV INDEX A2C: 18 ML/M2
ECHO LV ESV INDEX A4C: 17 ML/M2
ECHO LV FRACTIONAL SHORTENING: 34 % (ref 28–44)
ECHO LV INTERNAL DIMENSION DIASTOLE INDEX: 2.09 CM/M2
ECHO LV INTERNAL DIMENSION DIASTOLIC: 5 CM (ref 3.9–5.3)
ECHO LV INTERNAL DIMENSION SYSTOLIC INDEX: 1.38 CM/M2
ECHO LV INTERNAL DIMENSION SYSTOLIC: 3.3 CM
ECHO LV IVSD: 0.8 CM (ref 0.6–0.9)
ECHO LV MASS 2D: 135.8 G (ref 67–162)
ECHO LV MASS INDEX 2D: 56.8 G/M2 (ref 43–95)
ECHO LV POSTERIOR WALL DIASTOLIC: 0.8 CM (ref 0.6–0.9)
ECHO LV RELATIVE WALL THICKNESS RATIO: 0.32
ECHO LVOT AV VTI INDEX: 0.48
ECHO LVOT MEAN GRADIENT: 3 MMHG
ECHO LVOT PEAK GRADIENT: 5 MMHG
ECHO LVOT PEAK VELOCITY: 1.2 M/S
ECHO LVOT VTI: 25.5 CM
ECHO MV A VELOCITY: 0.69 M/S
ECHO MV E DECELERATION TIME (DT): 273 MS
ECHO MV E VELOCITY: 0.74 M/S
ECHO MV E/A RATIO: 1.07
ECHO MV E/E' LATERAL: 4.93
ECHO MV E/E' RATIO (AVERAGED): 7.09
ECHO MV E/E' SEPTAL: 9.25
ECHO MV LVOT VTI INDEX: 1.15
ECHO MV MAX VELOCITY: 0.8 M/S
ECHO MV MEAN GRADIENT: 1 MMHG
ECHO MV MEAN VELOCITY: 0.5 M/S
ECHO MV PEAK GRADIENT: 2 MMHG
ECHO MV VTI: 29.3 CM
ECHO RIGHT VENTRICULAR SYSTOLIC PRESSURE (RVSP): 24 MMHG
ECHO RV FRACTIONAL AREA CHANGE: 38 %
ECHO RV FREE WALL PEAK S': 10 CM/S
ECHO RV TAPSE: 2.1 CM (ref 1.7–?)
ECHO TV REGURGITANT MAX VELOCITY: 2.31 M/S
ECHO TV REGURGITANT PEAK GRADIENT: 21 MMHG
FOLATE SERPL-MCNC: 6.8 NG/ML (ref 4.78–24.2)
GFR SERPLBLD CREATININE-BSD FMLA CKD-EPI: >90 ML/MIN/{1.73_M2}
GLUCOSE SERPL-MCNC: 102 MG/DL (ref 70–99)
HCT VFR BLD AUTO: 38.6 % (ref 36–48)
HDLC SERPL-MCNC: 37 MG/DL (ref 40–60)
HGB BLD-MCNC: 12.7 G/DL (ref 12–16)
LDLC SERPL CALC-MCNC: 138 MG/DL
MAGNESIUM SERPL-MCNC: 2.2 MG/DL (ref 1.8–2.4)
MCH RBC QN AUTO: 25.6 PG (ref 26–34)
MCHC RBC AUTO-ENTMCNC: 32.8 G/DL (ref 31–36)
MCV RBC AUTO: 78 FL (ref 80–100)
PLATELET # BLD AUTO: 326 K/UL (ref 135–450)
PMV BLD AUTO: 8.3 FL (ref 5–10.5)
POTASSIUM SERPL-SCNC: 3.3 MMOL/L (ref 3.5–5.1)
RBC # BLD AUTO: 4.96 M/UL (ref 4–5.2)
SODIUM SERPL-SCNC: 135 MMOL/L (ref 136–145)
TRIGL SERPL-MCNC: 82 MG/DL (ref 0–150)
VIT B12 SERPL-MCNC: 446 PG/ML (ref 211–911)
VLDLC SERPL CALC-MCNC: 16 MG/DL
WBC # BLD AUTO: 5.5 K/UL (ref 4–11)

## 2024-06-10 PROCEDURE — 83735 ASSAY OF MAGNESIUM: CPT

## 2024-06-10 PROCEDURE — 85027 COMPLETE CBC AUTOMATED: CPT

## 2024-06-10 PROCEDURE — 6370000000 HC RX 637 (ALT 250 FOR IP): Performed by: STUDENT IN AN ORGANIZED HEALTH CARE EDUCATION/TRAINING PROGRAM

## 2024-06-10 PROCEDURE — 2580000003 HC RX 258: Performed by: STUDENT IN AN ORGANIZED HEALTH CARE EDUCATION/TRAINING PROGRAM

## 2024-06-10 PROCEDURE — 1200000000 HC SEMI PRIVATE

## 2024-06-10 PROCEDURE — 6370000000 HC RX 637 (ALT 250 FOR IP): Performed by: NURSE PRACTITIONER

## 2024-06-10 PROCEDURE — 36415 COLL VENOUS BLD VENIPUNCTURE: CPT

## 2024-06-10 PROCEDURE — 6370000000 HC RX 637 (ALT 250 FOR IP): Performed by: INTERNAL MEDICINE

## 2024-06-10 PROCEDURE — 80048 BASIC METABOLIC PNL TOTAL CA: CPT

## 2024-06-10 PROCEDURE — 99223 1ST HOSP IP/OBS HIGH 75: CPT | Performed by: INTERNAL MEDICINE

## 2024-06-10 PROCEDURE — 93306 TTE W/DOPPLER COMPLETE: CPT

## 2024-06-10 PROCEDURE — 80061 LIPID PANEL: CPT

## 2024-06-10 RX ORDER — METOPROLOL SUCCINATE 50 MG/1
50 TABLET, EXTENDED RELEASE ORAL DAILY
Status: DISCONTINUED | OUTPATIENT
Start: 2024-06-11 | End: 2024-06-11 | Stop reason: HOSPADM

## 2024-06-10 RX ORDER — POTASSIUM CHLORIDE 20 MEQ/1
20 TABLET, EXTENDED RELEASE ORAL 2 TIMES DAILY WITH MEALS
Status: DISCONTINUED | OUTPATIENT
Start: 2024-06-10 | End: 2024-06-11 | Stop reason: HOSPADM

## 2024-06-10 RX ORDER — FERROUS SULFATE 325(65) MG
325 TABLET ORAL 2 TIMES DAILY WITH MEALS
Status: DISCONTINUED | OUTPATIENT
Start: 2024-06-10 | End: 2024-06-11 | Stop reason: HOSPADM

## 2024-06-10 RX ADMIN — TOPIRAMATE 50 MG: 25 TABLET, FILM COATED ORAL at 08:29

## 2024-06-10 RX ADMIN — TORSEMIDE 20 MG: 20 TABLET ORAL at 08:29

## 2024-06-10 RX ADMIN — FERROUS SULFATE TAB 325 MG (65 MG ELEMENTAL FE) 325 MG: 325 (65 FE) TAB at 10:44

## 2024-06-10 RX ADMIN — SPIRONOLACTONE 25 MG: 25 TABLET ORAL at 08:29

## 2024-06-10 RX ADMIN — METOPROLOL SUCCINATE 100 MG: 50 TABLET, EXTENDED RELEASE ORAL at 08:29

## 2024-06-10 RX ADMIN — ASPIRIN 81 MG: 81 TABLET, COATED ORAL at 08:29

## 2024-06-10 RX ADMIN — SOTALOL HYDROCHLORIDE 120 MG: 80 TABLET ORAL at 08:29

## 2024-06-10 RX ADMIN — TOPIRAMATE 50 MG: 25 TABLET, FILM COATED ORAL at 21:07

## 2024-06-10 RX ADMIN — POTASSIUM CHLORIDE 40 MEQ: 1500 TABLET, EXTENDED RELEASE ORAL at 10:44

## 2024-06-10 RX ADMIN — ATORVASTATIN CALCIUM 40 MG: 40 TABLET, FILM COATED ORAL at 21:07

## 2024-06-10 RX ADMIN — SODIUM CHLORIDE, PRESERVATIVE FREE 10 ML: 5 INJECTION INTRAVENOUS at 10:45

## 2024-06-10 RX ADMIN — FERROUS SULFATE TAB 325 MG (65 MG ELEMENTAL FE) 325 MG: 325 (65 FE) TAB at 17:59

## 2024-06-10 RX ADMIN — POTASSIUM CHLORIDE 20 MEQ: 1500 TABLET, EXTENDED RELEASE ORAL at 17:59

## 2024-06-10 ASSESSMENT — PAIN SCALES - GENERAL: PAINLEVEL_OUTOF10: 2

## 2024-06-10 ASSESSMENT — PAIN DESCRIPTION - LOCATION: LOCATION: INCISION

## 2024-06-10 NOTE — PLAN OF CARE
Problem: Pain  Goal: Verbalizes/displays adequate comfort level or baseline comfort level  6/9/2024 2142 by Renetta Abarca, RN  Outcome: Progressing     Problem: Safety - Adult  Goal: Free from fall injury  6/9/2024 2142 by Renetta Abarca, RN  Outcome: Progressing

## 2024-06-10 NOTE — TELEPHONE ENCOUNTER
Transmission received on 6/9/24, no episodes or alerts. Current EGM appears to be NSR 85 bpm. PVC 6.3/hr

## 2024-06-10 NOTE — CONSULTS
(with meals)  Patient not taking: Reported on 6/9/2024 1/12/24   Jose A Sr MD   sotalol (BETAPACE) 120 MG tablet Take 1 tablet by mouth 2 times daily 1/12/24   Jose A Sr MD   buPROPion (WELLBUTRIN XL) 150 MG extended release tablet Take 1 tablet by mouth 2 times daily  Patient not taking: Reported on 6/9/2024 1/12/24   Jose A Sr MD   omeprazole (PRILOSEC) 20 MG delayed release capsule Take 1 capsule by mouth every morning (before breakfast)  Patient taking differently: Take 2 capsules by mouth every morning (before breakfast) New med, has not started yet, plans to start soon. 12/2/23   Jose Vásquez PA   ibuprofen (ADVIL;MOTRIN) 600 MG tablet Take 1 tablet by mouth every 6 hours as needed for Pain 12/2/23   Jose Vásquez PA   montelukast (SINGULAIR) 10 MG tablet Take 1 tablet by mouth daily  Patient taking differently: Take 1 tablet by mouth daily as needed 9/19/23   Jacy Carrera APRN - CNP   loratadine (CLARITIN) 10 MG tablet Take 1 tablet by mouth daily  Patient taking differently: Take 1 tablet by mouth daily as needed 9/19/23   Jacy Carrera APRN - CNP   torsemide (DEMADEX) 20 MG tablet Take 1 tablet by mouth in the morning and at bedtime 7/26/23   Jose A Sr MD   topiramate (TOPAMAX) 100 MG tablet Take by mouth 2 times daily For Migraines    Provider, MD Silvana   albuterol sulfate HFA (PROVENTIL HFA) 108 (90 Base) MCG/ACT inhaler Inhale 2 puffs into the lungs every 6 hours as needed for Wheezing 12/12/21   Jf Segal MD   aspirin 81 MG tablet Take 1 tablet by mouth daily    Provider, MD Silvana      Current Medications:  Current Facility-Administered Medications   Medication Dose Route Frequency Provider Last Rate Last Admin    nitroGLYCERIN (NITROSTAT) SL tablet 0.4 mg  0.4 mg SubLINGual Once Ashley Conde APRN - CNP        albuterol sulfate HFA (PROVENTIL;VENTOLIN;PROAIR) 108 (90 Base) MCG/ACT inhaler 2 puff  2 puff 
sotalol.  She had yeast infection with SGLT2 inhibitors.  She may benefit from Crestor.  I will discuss this with her congenital heart specialist to see if it can be tried.    - dual Medtronic Permanent pacemaker    Normal function      - Hypokalemia    Replace and Monitor      I independently reviewed and interpreted ECG, telemetry, cardiac labs, other relevant labs,  echo  CXR ,  device check interrogation . Unless otherwise reflected in the note, my interpretation is in agreement with the report and use them for decision making whether mentioned or not.  All previous relevant notes including notes and data from other hospitals and prior records were reviewed.  Complex decision making due to multiple chronic problems and symptoms of worsening and high risk of adverse outcome due to presence of congenital heart disease    Thank you for allowing me to participate in the care of Robyn López     NOTE: This report was transcribed using voice recognition software. Every effort was made to ensure accuracy, however, inadvertent computerized transcription errors may be present.

## 2024-06-10 NOTE — CARE COORDINATION
Discharge Planning Note:  Chart reviewed for discharge needs and it appears that patient has minimal needs for discharge at this     Primary Care Physician is Judy Hicks MD    Primary insurance is [employer]    Please notify case management if any discharge needs are identified.      Case management will continue to follow progress and update discharge plan as needed.

## 2024-06-11 ENCOUNTER — APPOINTMENT (OUTPATIENT)
Dept: CT IMAGING | Age: 39
DRG: 206 | End: 2024-06-11
Payer: COMMERCIAL

## 2024-06-11 VITALS
BODY MASS INDEX: 41.63 KG/M2 | TEMPERATURE: 97.3 F | HEIGHT: 69 IN | WEIGHT: 281.09 LBS | DIASTOLIC BLOOD PRESSURE: 66 MMHG | OXYGEN SATURATION: 100 % | RESPIRATION RATE: 16 BRPM | SYSTOLIC BLOOD PRESSURE: 97 MMHG | HEART RATE: 60 BPM

## 2024-06-11 LAB
ANION GAP SERPL CALCULATED.3IONS-SCNC: 8 MMOL/L (ref 3–16)
BUN SERPL-MCNC: 9 MG/DL (ref 7–20)
CALCIUM SERPL-MCNC: 9.3 MG/DL (ref 8.3–10.6)
CHLORIDE SERPL-SCNC: 108 MMOL/L (ref 99–110)
CO2 SERPL-SCNC: 22 MMOL/L (ref 21–32)
CREAT SERPL-MCNC: 0.8 MG/DL (ref 0.6–1.1)
D-DIMER QUANTITATIVE: 0.72 UG/ML FEU (ref 0–0.6)
GFR SERPLBLD CREATININE-BSD FMLA CKD-EPI: >90 ML/MIN/{1.73_M2}
GLUCOSE SERPL-MCNC: 115 MG/DL (ref 70–99)
POTASSIUM SERPL-SCNC: 4.2 MMOL/L (ref 3.5–5.1)
SODIUM SERPL-SCNC: 138 MMOL/L (ref 136–145)

## 2024-06-11 PROCEDURE — 36415 COLL VENOUS BLD VENIPUNCTURE: CPT

## 2024-06-11 PROCEDURE — 85379 FIBRIN DEGRADATION QUANT: CPT

## 2024-06-11 PROCEDURE — 6370000000 HC RX 637 (ALT 250 FOR IP): Performed by: NURSE PRACTITIONER

## 2024-06-11 PROCEDURE — 6370000000 HC RX 637 (ALT 250 FOR IP): Performed by: STUDENT IN AN ORGANIZED HEALTH CARE EDUCATION/TRAINING PROGRAM

## 2024-06-11 PROCEDURE — 99233 SBSQ HOSP IP/OBS HIGH 50: CPT | Performed by: INTERNAL MEDICINE

## 2024-06-11 PROCEDURE — 71260 CT THORAX DX C+: CPT

## 2024-06-11 PROCEDURE — 6370000000 HC RX 637 (ALT 250 FOR IP): Performed by: INTERNAL MEDICINE

## 2024-06-11 PROCEDURE — 80048 BASIC METABOLIC PNL TOTAL CA: CPT

## 2024-06-11 PROCEDURE — 2580000003 HC RX 258: Performed by: STUDENT IN AN ORGANIZED HEALTH CARE EDUCATION/TRAINING PROGRAM

## 2024-06-11 PROCEDURE — 6360000004 HC RX CONTRAST MEDICATION: Performed by: NURSE PRACTITIONER

## 2024-06-11 RX ORDER — METOPROLOL SUCCINATE 100 MG/1
50 TABLET, EXTENDED RELEASE ORAL DAILY
Qty: 135 TABLET | Refills: 1 | Status: SHIPPED
Start: 2024-06-11

## 2024-06-11 RX ORDER — LORATADINE 10 MG/1
10 TABLET ORAL DAILY PRN
COMMUNITY
Start: 2024-06-11

## 2024-06-11 RX ORDER — SPIRONOLACTONE 25 MG/1
25 TABLET ORAL DAILY
Qty: 30 TABLET | Refills: 0 | Status: SHIPPED | OUTPATIENT
Start: 2024-06-12

## 2024-06-11 RX ORDER — MONTELUKAST SODIUM 10 MG/1
10 TABLET ORAL DAILY PRN
COMMUNITY
Start: 2024-06-11

## 2024-06-11 RX ORDER — IBUPROFEN 600 MG/1
600 TABLET ORAL EVERY 6 HOURS PRN
Qty: 16 TABLET | Refills: 0 | Status: SHIPPED | OUTPATIENT
Start: 2024-06-11

## 2024-06-11 RX ADMIN — SPIRONOLACTONE 25 MG: 25 TABLET ORAL at 08:31

## 2024-06-11 RX ADMIN — METOPROLOL SUCCINATE 50 MG: 50 TABLET, EXTENDED RELEASE ORAL at 08:31

## 2024-06-11 RX ADMIN — ASPIRIN 81 MG: 81 TABLET, COATED ORAL at 08:31

## 2024-06-11 RX ADMIN — IOPAMIDOL 75 ML: 755 INJECTION, SOLUTION INTRAVENOUS at 09:22

## 2024-06-11 RX ADMIN — POTASSIUM CHLORIDE 20 MEQ: 1500 TABLET, EXTENDED RELEASE ORAL at 08:31

## 2024-06-11 RX ADMIN — FERROUS SULFATE TAB 325 MG (65 MG ELEMENTAL FE) 325 MG: 325 (65 FE) TAB at 08:30

## 2024-06-11 RX ADMIN — SODIUM CHLORIDE, PRESERVATIVE FREE 10 ML: 5 INJECTION INTRAVENOUS at 08:32

## 2024-06-11 RX ADMIN — TOPIRAMATE 50 MG: 25 TABLET, FILM COATED ORAL at 08:31

## 2024-06-11 ASSESSMENT — PAIN DESCRIPTION - INTENSITY: RATING_2: 3

## 2024-06-11 ASSESSMENT — PAIN DESCRIPTION - ORIENTATION
ORIENTATION: DISTAL
ORIENTATION_2: LEFT

## 2024-06-11 ASSESSMENT — PAIN DESCRIPTION - LOCATION
LOCATION: FOOT
LOCATION_2: CHEST

## 2024-06-11 ASSESSMENT — PAIN SCALES - GENERAL
PAINLEVEL_OUTOF10: 0
PAINLEVEL_OUTOF10: 0
PAINLEVEL_OUTOF10: 5

## 2024-06-11 ASSESSMENT — PAIN DESCRIPTION - DESCRIPTORS
DESCRIPTORS: NUMBNESS;TINGLING
DESCRIPTORS_2: ITCHING

## 2024-06-11 NOTE — PLAN OF CARE
Problem: Discharge Planning  Goal: Discharge to home or other facility with appropriate resources  Outcome: Progressing  Flowsheets (Taken 6/10/2024 2000)  Discharge to home or other facility with appropriate resources:   Identify barriers to discharge with patient and caregiver   Arrange for needed discharge resources and transportation as appropriate   Identify discharge learning needs (meds, wound care, etc)     Problem: Pain  Goal: Verbalizes/displays adequate comfort level or baseline comfort level  Outcome: Progressing  Flowsheets (Taken 6/11/2024 0130)  Verbalizes/displays adequate comfort level or baseline comfort level:   Encourage patient to monitor pain and request assistance   Assess pain using appropriate pain scale   Administer analgesics based on type and severity of pain and evaluate response   Implement non-pharmacological measures as appropriate and evaluate response     Problem: Safety - Adult  Goal: Free from fall injury  Outcome: Progressing

## 2024-06-11 NOTE — DISCHARGE SUMMARY
CONTINUE taking these medications      albuterol sulfate  (90 Base) MCG/ACT inhaler  Commonly known as: Proventil HFA  Inhale 2 puffs into the lungs every 6 hours as needed for Wheezing  Notes to patient: Albuterol/pratropium (DuoNeb) or Albuterol (Proventil)  Use: to open airways, reduce secretions  Side effects: nervousness, jitteriness, shakiness, headache, fast heartbeat     aspirin 81 MG tablet  Notes to patient: Thins blood to prevent clotting, ie heart attack or stroke  Side effects: may cause extra bruising or bleeding      Claritin 10 MG tablet  Generic drug: loratadine     ibuprofen 600 MG tablet  Commonly known as: ADVIL;MOTRIN  Take 1 tablet by mouth every 6 hours as needed for Pain     montelukast 10 MG tablet  Commonly known as: SINGULAIR     topiramate 100 MG tablet  Commonly known as: TOPAMAX  Notes to patient: Use: as an anticonvulsant to treat or prevent seizures as well as nerve pain medication. Helps treat and prevent migraine headaches.  Side effects: tingling in arms/legs, increased bleeding/bruising, loss of appetite, nausea, changes in taste of foods, diarrhea, weight loss and changes in mood     torsemide 20 MG tablet  Commonly known as: DEMADEX  Take 1 tablet by mouth in the morning and at bedtime  Notes to patient: Use: treat heart failure, fluid retention, lower blood pressure.  Side effects: frequent urination, weakness, muscle cramps, increased sensitivity to light, nausea and dizziness.            STOP taking these medications      buPROPion 150 MG extended release tablet  Commonly known as: WELLBUTRIN XL     metFORMIN 500 MG tablet  Commonly known as: GLUCOPHAGE     sotalol 120 MG tablet  Commonly known as: BETAPACE               Where to Get Your Medications        These medications were sent to [id number] - [place], [place] - [address] - P [phone] - F [phone]  [address] [facility] [id number]      Hours: 24-hours Phone:

## 2024-06-11 NOTE — CARE COORDINATION
Discharge Planning  Pt has received information and a pamphlet regarding [facility]itional Care Program that will follow up with pt after discharge.  Pt has agreed to this service and [facility] will be contacting patient or patient's family to schedule an after discharge follow up visit at the patient's home.  Pt denied questions at this time and was receptive to this discharge planning intervention. Pamphlet left with patient/family.

## 2024-06-12 NOTE — PROGRESS NOTES
Hospitalist Progress Note    Patient seen and examined.  H&P reviewed.  No changes in physical exam. Patient reports palpitation associated with CP x several months, however now worsening.  Labs and imaging reviewed.      Pertinent Diagnostics Reviewed:    Updates in H&P as follows:       Problem List  Principal Problem:    Palpitation  Active Problems:    Chest pain  Resolved Problems:    * No resolved hospital problems. 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    
    Hospitalist Progress Note      Name:  Robyn López /Age/Sex: 1985  (38 y.o. female)   MRN & CSN:  [id number] & [id number] Encounter Date/Time: 6/10/2024 7:38 AM EDT   Location:  [id number] PCP: Judy Hicks MD     Attending:Paul Johnson MD       Hospital Day: 2    Subjective:   Chief Complaint:   Chief Complaint   Patient presents with    Chest Pain     Palpitations, Sob and chest pain for several days      Robyn López is a 38 y.o. female with a past medical history of sciatica, asthma, VSD repaired DORV with LV dysfunction. She underwent interventricular tunnel VSD closure in . She subsequently developed a double chamber right ventricle requiring RV muscle bundle resection in . She developed atrial tachycardia in  and later Atrial fibrillation. S/p Permanent pacemaker , and Atrial fibrillation/flutter ablation who presented with palpitations and chest pain.       Interval History:  Today, she is resting in bed.  She feels cold today, but no other new symptoms.  Continues with episodes of palpitations and CP overnight.  Cardiology not planning for ablation today.     Independently reviewed interval ancillary notes from cardiology.     Assessment and Recommendations   Problem List  Principal Problem:    Palpitation  Active Problems:    Chest pain  Resolved Problems:    * No resolved hospital problems. *     Assessment and Plan:    Palpitations, likely paroxysmal A-fib versus high PVC/PAC burden    - Continue with palpitaions episodes with CP overnight   - Cardiology consulted and recommend aggressive electrolyte repletion, discussed with Dr. 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 
Cards notified about softer Bp's. Pt alternating heat and ice for pacemaker chest pain. VSS. No further complaints at this time.  
Completed admission. Oriented to room, bed, and call light. Ambulates independently, informed to call if needing assist. Got ice water per request. Denies other needs, call light in reach.   
Data- discharge order received, pt verbalized agreement to discharge, disposition to previous residence, no needs for HHC/DME.     Action- discharge instructions prepared and given to pt, pt verbalized understanding. Medication information packet given r/t NEW and/or CHANGED prescriptions emphasizing name/purpose/side effects, pt verbalized understanding. Discharge instruction summary: Diet- general, Activity- up as tolerated, Primary Care Physician as follows: Judy Hicks -[phone] f/u appointment within a week of dc, immunizations reviewed and updated, prescription medications filled pharmacy of choice.  CHF Education reviewed. Pt/ Family has had a total of 60 minutes CHF education this admission encounter.     1. WEIGHT: Admit Weight - Scale: 126.1 kg (278 lb) (06/08/24 2328)        Today  Weight - Scale: 127.5 kg (281 lb 1.4 oz) (06/11/24 0130)       2. O2 SAT.: SpO2: 100 % (06/11/24 1100)    Response- Pt belongings gathered, IV removed. Disposition is home (no HHC/DME needs), transported with belongings, taken to lobby via w/c w/ PCA, no complications.    
Patient can be going home on her previous meds except the following from EP standpoint.  - continue toprol xl 50 mg qd  -stop sotalol    HF meds as per her cardiologist or HF team  
medications can be continued for as per cardiology team.  She will follow-up with Dr. Martinez.  Consideration for Entresto can be given at that point.      We will monitor her device remotely to see if the burden of PVC or other arrhythmias increased off of sotalol and decide about resumption of that medication if needed.      I independently reviewed and interpreted ECG, telemetry, cardiac labs, other relevant labs,  echo  CXR ,  device check interrogation . Unless otherwise reflected in the note, my interpretation is in agreement with the report and use them for decision making whether mentioned or not.  All previous relevant notes including notes and data from other hospitals and prior records were reviewed.  Complex decision making due to multiple chronic problems and symptoms of worsening and high risk of adverse outcome due to presence of congenital heart disease    Thank you for allowing me to participate in the care of Robyn López     NOTE: This report was transcribed using voice recognition software. Every effort was made to ensure accuracy, however, inadvertent computerized transcription errors may be present.

## 2024-06-14 ENCOUNTER — TELEPHONE (OUTPATIENT)
Dept: CARDIOLOGY CLINIC | Age: 39
End: 2024-06-14

## 2024-06-14 NOTE — TELEPHONE ENCOUNTER
EP Triage Questionnaire  Does the patient have a device? [x]Y  [] N, If yes ask them to please send an interrogation.  Sending transmission    What is their HR and BP? Oxgen 98 / HR 66    Have they had a recent procedure?  NO    What symptoms are they having? Shaky, jittery, chest discomfort at times, light headed, weak some sob    How long have they had symptoms? Since leaving hospital on Tuesday     Have they taken or missed medications recently and if so which ones?   Nothing Missed     Pt is waiting for [facility] to call [facility] to schedule MRI

## 2024-06-14 NOTE — TELEPHONE ENCOUNTER
Device interrogation is normal. No significant episodes noted 6 PVC in an hour, which is approximately a 0.002% burden. She [facility]. Symptoms not related to her Pacemaker

## 2024-06-14 NOTE — TELEPHONE ENCOUNTER
Patient advised of device interrogation report, states that she is waiting for [facility] to call her back so she can schedule her MRI.

## 2024-06-28 PROBLEM — K21.9 GASTROESOPHAGEAL REFLUX DISEASE WITHOUT ESOPHAGITIS: Status: ACTIVE | Noted: 2024-06-28

## 2024-06-28 PROBLEM — R68.89 COLD INTOLERANCE: Status: ACTIVE | Noted: 2024-06-28

## 2024-06-28 PROBLEM — R71.8 LOW MEAN CORPUSCULAR VOLUME (MCV): Status: ACTIVE | Noted: 2024-06-28

## 2024-07-02 ENCOUNTER — TELEPHONE (OUTPATIENT)
Dept: CARDIOLOGY CLINIC | Age: 39
End: 2024-07-02

## 2024-07-02 NOTE — TELEPHONE ENCOUNTER
CARDIAC CLEARANCE     What type of procedure are you having?  Cardiac MRI    Which physician is performing your procedure?  Dr. Deepika Martinez    When is your procedure scheduled for?  Pending     Where are you having this procedure?  [facility]    Are you taking Blood Thinners? Not sure   If so what? (Name/dose/frequesncy)     Does the surgeon want you to stop your blood thinner?  If so for how long?  N/A    Phone Number and Contact Name for Physicians office:  Torri - [phone]    Fax number to send information: [phone]    NOTE:  Wants to know if her pacemaker is compatible w/MRI and there are no other Leads  such as abandon, epicardial.    Torri stated that she fax over a clearance around 06/14.  There another form that she is re-faxing over that needs to be filled out.  Please advise.  Thank you

## 2024-07-08 ENCOUNTER — HOSPITAL ENCOUNTER (OUTPATIENT)
Age: 39
Discharge: HOME | End: 2024-07-08
Payer: COMMERCIAL

## 2024-07-08 LAB
ALBUMIN SERPL-MCNC: 4.2 G/DL (ref 3.4–5)
ALBUMIN/GLOB SERPL: 1.2 {RATIO} (ref 1.1–2.2)
ALP SERPL-CCNC: 66 U/L (ref 40–129)
ALT SERPL-CCNC: 27 U/L (ref 10–40)
ANION GAP SERPL CALCULATED.3IONS-SCNC: 11 MMOL/L (ref 3–16)
AST SERPL-CCNC: 21 U/L (ref 15–37)
BILIRUB SERPL-MCNC: 0.5 MG/DL (ref 0–1)
BUN SERPL-MCNC: 13 MG/DL (ref 7–20)
CALCIUM SERPL-MCNC: 9.2 MG/DL (ref 8.3–10.6)
CHLORIDE SERPL-SCNC: 106 MMOL/L (ref 99–110)
CO2 SERPL-SCNC: 24 MMOL/L (ref 21–32)
CREAT SERPL-MCNC: 0.7 MG/DL (ref 0.6–1.1)
GFR SERPLBLD CREATININE-BSD FMLA CKD-EPI: >90 ML/MIN/{1.73_M2}
GLUCOSE SERPL-MCNC: 103 MG/DL (ref 70–99)
MAGNESIUM SERPL-MCNC: 2.3 MG/DL (ref 1.8–2.4)
POTASSIUM SERPL-SCNC: 3.5 MMOL/L (ref 3.5–5.1)
PROT SERPL-MCNC: 7.7 G/DL (ref 6.4–8.2)
SODIUM SERPL-SCNC: 141 MMOL/L (ref 136–145)
TSH SERPL DL<=0.005 MIU/L-ACNC: 1.48 UIU/ML (ref 0.27–4.2)

## 2024-07-08 PROCEDURE — 83020 HEMOGLOBIN ELECTROPHORESIS: CPT

## 2024-07-08 PROCEDURE — 83735 ASSAY OF MAGNESIUM: CPT

## 2024-07-08 PROCEDURE — 84443 ASSAY THYROID STIM HORMONE: CPT

## 2024-07-08 PROCEDURE — 83036 HEMOGLOBIN GLYCOSYLATED A1C: CPT

## 2024-07-08 PROCEDURE — 80053 COMPREHEN METABOLIC PANEL: CPT

## 2024-07-08 PROCEDURE — 36415 COLL VENOUS BLD VENIPUNCTURE: CPT

## 2024-07-09 ENCOUNTER — PATIENT MESSAGE (OUTPATIENT)
Dept: CARDIOLOGY CLINIC | Age: 39
End: 2024-07-09

## 2024-07-09 LAB
EST. AVERAGE GLUCOSE BLD GHB EST-MCNC: 102.5 MG/DL
HBA1C MFR BLD: 5.2 %
HGB FRACT BLD ELPH-IMP: NORMAL
HGB FRACT BLD ELPH-IMP: NORMAL

## 2024-07-09 NOTE — TELEPHONE ENCOUNTER
From: Robyn López  To: Dr. Jose A Sr  Sent: 7/9/2024 11:04 AM EDT  Subject: Hello     Hello I’m reviewing my blood test results and my hemoglobin results say see comments but I can’t see any comments can you explain this thanks.

## 2024-07-12 ENCOUNTER — TELEPHONE (OUTPATIENT)
Dept: CARDIOLOGY CLINIC | Age: 39
End: 2024-07-12

## 2024-07-12 NOTE — TELEPHONE ENCOUNTER
Spoke to pt about message below and she v/u.     Jose A Sr MD  You5 minutes ago (3:50 PM)     \  September is soon enough, thanks

## 2024-07-12 NOTE — TELEPHONE ENCOUNTER
Spoke with pt about message below. Appointment for 07/15/2024 canceled. Pt next appointment with MIKKI is 09/16/2024 at  location. Is this an ok appointment time or should she be seen sooner?    Jose A Sr MD  You; [facility] Staff3 minutes ago (3:09 PM)       Best to wait until after her cardiac MRI

## 2024-07-19 ENCOUNTER — TELEPHONE (OUTPATIENT)
Dept: CARDIOLOGY CLINIC | Age: 39
End: 2024-07-19

## 2024-07-19 NOTE — TELEPHONE ENCOUNTER
Episodes of AT/AF, while brief, they seem symptomatic. Increase metoprolol to 75 mg QD    Ochoa Lara, APRN-CNP

## 2024-07-19 NOTE — TELEPHONE ENCOUNTER
Spoke to pt, HM is not working. Pt is going to try to send in a transmission again if it does not work she is on the schedule for 3 pm for a device check.

## 2024-07-19 NOTE — TELEPHONE ENCOUNTER
Spoke with pt about message below she V/U.     Pt wanted to inform MXA that she can notice it feels like her heart is betting real fast in one spot, she feels it the most on her left side.     Pt wanted to inform MIKKI of the increase of the metoprolol and she would like to know if MIKKI would like her to move up her appointment due to the increase. Next OV 09/16/2024

## 2024-07-19 NOTE — TELEPHONE ENCOUNTER
Pt is sending a transmission.  She is experiencing fluttering in her chest, jittery, she is having tingling and numbness in left arm.  It is hard to explain, almost like short of breath when she lays down.  It eases up, then it comes back again.  It is discomfort, and she had a slight chest pain, but that could be gas. She is not having any chest pain right now, just jittery and feels like an after effect of the flutter.    Please advise

## 2024-07-19 NOTE — TELEPHONE ENCOUNTER
Received transmission. Current EGM shows AsVs @ 75 bpm. 1 NSVT episode from today at 0854 x 1s max A/V 68/146. 1 AF episode on 7/19/24 at 0851 x 46 seconds max A/V 132/97. Pt not on a blood thinner. Total of 6 NSVT episodes, 289 fast A& V, and 11 AT/AF episodes.    Hx Paroxysmal Atrial fibrillation/flutter with rapid ventricular response  Atrial tachycardia, NSVT, High grade AV block, Diastolic HF, TR, Syncope/Pause, Congenital heart disease.

## 2024-08-06 ENCOUNTER — TELEPHONE (OUTPATIENT)
Dept: CARDIOLOGY CLINIC | Age: 39
End: 2024-08-06

## 2024-08-06 NOTE — TELEPHONE ENCOUNTER
Called and spoke to [employer] rep. And she said that she did not have any episodes since her MRI yesterday. Told her that she needs to watch her symptoms and if they get worse to call us back and let us know. She said that she wants A Nurse to call her or Missy to make sure that the settings are correct. Please advise.

## 2024-08-06 NOTE — TELEPHONE ENCOUNTER
Called Patient back and let her know that the settings were unchanged from the MRI. Patient states she had a hard time with the MRI settings. Will make a notation in chart.

## 2024-08-06 NOTE — TELEPHONE ENCOUNTER
Pt called stating after the CT scan at [facility] they  were experiencing fluttering in her chest, during the night pt was having sharp in chest, pt turned over and the pain went into the left arm.     Right now the pt is  jittery, clammy, and walk is off pt stated they feel off like something isn't right and would like to know what they should do? Pt was offered to get scheduled with [facility], however pt would like to speak to DR KAYE, pt did send transmission.    Pls advise

## 2024-09-16 ENCOUNTER — OFFICE VISIT (OUTPATIENT)
Dept: CARDIOLOGY CLINIC | Age: 39
End: 2024-09-16
Payer: COMMERCIAL

## 2024-09-16 VITALS
HEART RATE: 95 BPM | HEIGHT: 69 IN | OXYGEN SATURATION: 98 % | BODY MASS INDEX: 40.11 KG/M2 | WEIGHT: 270.8 LBS | DIASTOLIC BLOOD PRESSURE: 70 MMHG | SYSTOLIC BLOOD PRESSURE: 100 MMHG

## 2024-09-16 DIAGNOSIS — Z95.0 PACEMAKER: ICD-10-CM

## 2024-09-16 DIAGNOSIS — I50.32 CHRONIC DIASTOLIC HEART FAILURE (HCC): ICD-10-CM

## 2024-09-16 DIAGNOSIS — I44.2 CHB (COMPLETE HEART BLOCK) (HCC): ICD-10-CM

## 2024-09-16 DIAGNOSIS — I36.1 NONRHEUMATIC TRICUSPID VALVE REGURGITATION: ICD-10-CM

## 2024-09-16 DIAGNOSIS — E66.01 CLASS 2 SEVERE OBESITY DUE TO EXCESS CALORIES WITH SERIOUS COMORBIDITY AND BODY MASS INDEX (BMI) OF 39.0 TO 39.9 IN ADULT (HCC): ICD-10-CM

## 2024-09-16 DIAGNOSIS — Q20.1 DORV (DOUBLE OUTLET RIGHT VENTRICLE): ICD-10-CM

## 2024-09-16 DIAGNOSIS — I48.3 TYPICAL ATRIAL FLUTTER (HCC): ICD-10-CM

## 2024-09-16 DIAGNOSIS — I47.19 ATRIAL TACHYCARDIA (HCC): ICD-10-CM

## 2024-09-16 DIAGNOSIS — Q24.9 CONGENITAL HEART DISEASE: Primary | ICD-10-CM

## 2024-09-16 PROCEDURE — 99215 OFFICE O/P EST HI 40 MIN: CPT | Performed by: INTERNAL MEDICINE

## 2024-09-16 PROCEDURE — G2211 COMPLEX E/M VISIT ADD ON: HCPCS | Performed by: INTERNAL MEDICINE

## 2024-09-30 ENCOUNTER — TELEPHONE (OUTPATIENT)
Dept: CARDIOLOGY CLINIC | Age: 39
End: 2024-09-30

## 2024-09-30 NOTE — TELEPHONE ENCOUNTER
Spoke to Pt and advised to reach out to PCP as symptoms aren't believed to be cardiac. Pt verbalized understanding and reached out to PCP.

## 2024-09-30 NOTE — TELEPHONE ENCOUNTER
Pt states she is having problems sending transmission, states her candido is not working.    She is around the hospital now, and is asking if she can come in and have it done at our office.  She has to be back to work at 12:30.    Please advise.

## 2024-09-30 NOTE — TELEPHONE ENCOUNTER
Spoke to Pt, requested she send a transmission from pacemaker, she stated she was at [facility] so it may take a little while for us to receive transmission. Advised Pt we would be on the look-out for report and c/b once received and reviewed. Pt verbalized understanding.

## 2024-09-30 NOTE — TELEPHONE ENCOUNTER
Pt is experiencing chest pain and pain with numbness and tingling on her left side. It started about Friday/Saturday.   She has not been feeling good for about a week.    Over the weekend numbness started in left shoulder and went down her left arm. When the pain and numbness starts she rated her discomfort/pain at an 8.  Going to sleep helps, but nothing else helps.  Its hard to fall asleep because of the pain, but it just goes away on its own so she just sits there.    When she walks around at times it feels better, but sometimes it is just the same.   She is experiencing nausea with eating and without eating, she is a little light headed and she is having fluttering for a couple of days.  She has not had any changed in medication.    Please advise.

## 2024-09-30 NOTE — TELEPHONE ENCOUNTER
She has brief episodes of SVT vs sinus tach on her device. These episodes are very brief in duration and generally last <1 minute. I doubt these are the cause of her symptoms.

## 2024-09-30 NOTE — TELEPHONE ENCOUNTER
We received remote transmission from patient's monitor at home. Transmission shows normal sensing and pacing function.   Current EGM shows ASVS at ~ 90 bpm  Since 9/2/2024  1 NSVT and 23 Fast A&V noted-all apaer to be SVT-ST  EP will review. See interrogation in [facility] for more details.    Please advise. Thanks

## 2024-10-21 ENCOUNTER — HOSPITAL ENCOUNTER (INPATIENT)
Age: 39
LOS: 1 days | Discharge: HOME | DRG: 309 | End: 2024-10-21
Attending: STUDENT IN AN ORGANIZED HEALTH CARE EDUCATION/TRAINING PROGRAM | Admitting: INTERNAL MEDICINE
Payer: COMMERCIAL

## 2024-10-21 ENCOUNTER — APPOINTMENT (OUTPATIENT)
Dept: GENERAL RADIOLOGY | Age: 39
DRG: 309 | End: 2024-10-21
Payer: COMMERCIAL

## 2024-10-21 VITALS
HEIGHT: 69 IN | HEART RATE: 76 BPM | WEIGHT: 273 LBS | TEMPERATURE: 97.4 F | SYSTOLIC BLOOD PRESSURE: 114 MMHG | OXYGEN SATURATION: 97 % | BODY MASS INDEX: 40.43 KG/M2 | RESPIRATION RATE: 18 BRPM | DIASTOLIC BLOOD PRESSURE: 75 MMHG

## 2024-10-21 DIAGNOSIS — I49.9 CARDIAC ARRHYTHMIA, UNSPECIFIED CARDIAC ARRHYTHMIA TYPE: Primary | ICD-10-CM

## 2024-10-21 DIAGNOSIS — R07.9 CHEST PAIN, UNSPECIFIED TYPE: ICD-10-CM

## 2024-10-21 DIAGNOSIS — E87.6 HYPOKALEMIA: ICD-10-CM

## 2024-10-21 DIAGNOSIS — R00.2 PALPITATIONS: ICD-10-CM

## 2024-10-21 PROBLEM — I48.0 PAROXYSMAL A-FIB (HCC): Status: ACTIVE | Noted: 2024-10-21

## 2024-10-21 LAB
ALBUMIN SERPL-MCNC: 4.1 G/DL (ref 3.4–5)
ALBUMIN/GLOB SERPL: 1.3 {RATIO} (ref 1.1–2.2)
ALP SERPL-CCNC: 59 U/L (ref 40–129)
ALT SERPL-CCNC: 12 U/L (ref 10–40)
ANION GAP SERPL CALCULATED.3IONS-SCNC: 11 MMOL/L (ref 3–16)
AST SERPL-CCNC: 18 U/L (ref 15–37)
BACTERIA URNS QL MICRO: NORMAL /HPF
BASOPHILS # BLD: 0.1 K/UL (ref 0–0.2)
BASOPHILS NFR BLD: 1 %
BILIRUB SERPL-MCNC: 0.3 MG/DL (ref 0–1)
BILIRUB UR QL STRIP.AUTO: NEGATIVE
BUN SERPL-MCNC: 13 MG/DL (ref 7–20)
CALCIUM SERPL-MCNC: 9 MG/DL (ref 8.3–10.6)
CHLORIDE SERPL-SCNC: 103 MMOL/L (ref 99–110)
CLARITY UR: ABNORMAL
CO2 SERPL-SCNC: 25 MMOL/L (ref 21–32)
COLOR UR: YELLOW
CREAT SERPL-MCNC: 0.9 MG/DL (ref 0.6–1.1)
D-DIMER QUANTITATIVE: 0.49 UG/ML FEU (ref 0–0.6)
DEPRECATED RDW RBC AUTO: 13.2 % (ref 12.4–15.4)
EKG ATRIAL RATE: 88 BPM
EKG DIAGNOSIS: NORMAL
EKG P AXIS: 35 DEGREES
EKG P-R INTERVAL: 120 MS
EKG Q-T INTERVAL: 416 MS
EKG QRS DURATION: 150 MS
EKG QTC CALCULATION (BAZETT): 503 MS
EKG R AXIS: -2 DEGREES
EKG T AXIS: 24 DEGREES
EKG VENTRICULAR RATE: 88 BPM
EOSINOPHIL # BLD: 0.4 K/UL (ref 0–0.6)
EOSINOPHIL NFR BLD: 5.5 %
EPI CELLS #/AREA URNS AUTO: 1 /HPF (ref 0–5)
FLUAV RNA RESP QL NAA+PROBE: NOT DETECTED
FLUBV RNA RESP QL NAA+PROBE: NOT DETECTED
GFR SERPLBLD CREATININE-BSD FMLA CKD-EPI: 83 ML/MIN/{1.73_M2}
GLUCOSE SERPL-MCNC: 110 MG/DL (ref 70–99)
GLUCOSE UR STRIP.AUTO-MCNC: NEGATIVE MG/DL
HCG SERPL QL: NEGATIVE
HCG UR QL: NEGATIVE
HCT VFR BLD AUTO: 34.1 % (ref 36–48)
HGB BLD-MCNC: 11.5 G/DL (ref 12–16)
HGB UR QL STRIP.AUTO: NEGATIVE
HYALINE CASTS #/AREA URNS AUTO: 0 /LPF (ref 0–8)
KETONES UR STRIP.AUTO-MCNC: NEGATIVE MG/DL
LEUKOCYTE ESTERASE UR QL STRIP.AUTO: ABNORMAL
LIPASE SERPL-CCNC: 21 U/L (ref 13–60)
LYMPHOCYTES # BLD: 1.7 K/UL (ref 1–5.1)
LYMPHOCYTES NFR BLD: 26.4 %
MAGNESIUM SERPL-MCNC: 2.16 MG/DL (ref 1.8–2.4)
MCH RBC QN AUTO: 25.8 PG (ref 26–34)
MCHC RBC AUTO-ENTMCNC: 33.6 G/DL (ref 31–36)
MCV RBC AUTO: 76.7 FL (ref 80–100)
MONOCYTES # BLD: 0.7 K/UL (ref 0–1.3)
MONOCYTES NFR BLD: 10.5 %
NEUTROPHILS # BLD: 3.7 K/UL (ref 1.7–7.7)
NEUTROPHILS NFR BLD: 56.6 %
NITRITE UR QL STRIP.AUTO: NEGATIVE
NT-PROBNP SERPL-MCNC: 86 PG/ML (ref 0–124)
PH UR STRIP.AUTO: 6.5 [PH] (ref 5–8)
PLATELET # BLD AUTO: 309 K/UL (ref 135–450)
PMV BLD AUTO: 7.9 FL (ref 5–10.5)
POTASSIUM SERPL-SCNC: 3 MMOL/L (ref 3.5–5.1)
PROT SERPL-MCNC: 7.2 G/DL (ref 6.4–8.2)
PROT UR STRIP.AUTO-MCNC: NEGATIVE MG/DL
RBC # BLD AUTO: 4.44 M/UL (ref 4–5.2)
RBC CLUMPS #/AREA URNS AUTO: 0 /HPF (ref 0–4)
RSV AG NOSE QL: NEGATIVE
SARS-COV-2 RNA RESP QL NAA+PROBE: NOT DETECTED
SODIUM SERPL-SCNC: 139 MMOL/L (ref 136–145)
SP GR UR STRIP.AUTO: 1.01 (ref 1–1.03)
TROPONIN, HIGH SENSITIVITY: <6 NG/L (ref 0–14)
UA COMPLETE W REFLEX CULTURE PNL UR: ABNORMAL
UA DIPSTICK W REFLEX MICRO PNL UR: YES
URN SPEC COLLECT METH UR: ABNORMAL
UROBILINOGEN UR STRIP-ACNC: 1 E.U./DL
WBC # BLD AUTO: 6.5 K/UL (ref 4–11)
WBC #/AREA URNS AUTO: 3 /HPF (ref 0–5)

## 2024-10-21 PROCEDURE — 84703 CHORIONIC GONADOTROPIN ASSAY: CPT

## 2024-10-21 PROCEDURE — 2060000000 HC ICU INTERMEDIATE R&B

## 2024-10-21 PROCEDURE — 6370000000 HC RX 637 (ALT 250 FOR IP): Performed by: STUDENT IN AN ORGANIZED HEALTH CARE EDUCATION/TRAINING PROGRAM

## 2024-10-21 PROCEDURE — 94150 VITAL CAPACITY TEST: CPT

## 2024-10-21 PROCEDURE — 6370000000 HC RX 637 (ALT 250 FOR IP): Performed by: INTERNAL MEDICINE

## 2024-10-21 PROCEDURE — 84484 ASSAY OF TROPONIN QUANT: CPT

## 2024-10-21 PROCEDURE — 93010 ELECTROCARDIOGRAM REPORT: CPT | Performed by: INTERNAL MEDICINE

## 2024-10-21 PROCEDURE — 85025 COMPLETE CBC W/AUTO DIFF WBC: CPT

## 2024-10-21 PROCEDURE — 99223 1ST HOSP IP/OBS HIGH 75: CPT | Performed by: INTERNAL MEDICINE

## 2024-10-21 PROCEDURE — 81001 URINALYSIS AUTO W/SCOPE: CPT

## 2024-10-21 PROCEDURE — 85379 FIBRIN DEGRADATION QUANT: CPT

## 2024-10-21 PROCEDURE — 83690 ASSAY OF LIPASE: CPT

## 2024-10-21 PROCEDURE — 87807 RSV ASSAY W/OPTIC: CPT

## 2024-10-21 PROCEDURE — 96374 THER/PROPH/DIAG INJ IV PUSH: CPT

## 2024-10-21 PROCEDURE — 83735 ASSAY OF MAGNESIUM: CPT

## 2024-10-21 PROCEDURE — 83880 ASSAY OF NATRIURETIC PEPTIDE: CPT

## 2024-10-21 PROCEDURE — 80053 COMPREHEN METABOLIC PANEL: CPT

## 2024-10-21 PROCEDURE — 6370000000 HC RX 637 (ALT 250 FOR IP): Performed by: PHYSICIAN ASSISTANT

## 2024-10-21 PROCEDURE — 6360000002 HC RX W HCPCS: Performed by: PHYSICIAN ASSISTANT

## 2024-10-21 PROCEDURE — 99285 EMERGENCY DEPT VISIT HI MDM: CPT

## 2024-10-21 PROCEDURE — 2580000003 HC RX 258: Performed by: INTERNAL MEDICINE

## 2024-10-21 PROCEDURE — 71046 X-RAY EXAM CHEST 2 VIEWS: CPT

## 2024-10-21 PROCEDURE — 93005 ELECTROCARDIOGRAM TRACING: CPT | Performed by: STUDENT IN AN ORGANIZED HEALTH CARE EDUCATION/TRAINING PROGRAM

## 2024-10-21 PROCEDURE — 94760 N-INVAS EAR/PLS OXIMETRY 1: CPT

## 2024-10-21 PROCEDURE — 87636 SARSCOV2 & INF A&B AMP PRB: CPT

## 2024-10-21 RX ORDER — PANTOPRAZOLE SODIUM 40 MG/1
40 TABLET, DELAYED RELEASE ORAL
Status: DISCONTINUED | OUTPATIENT
Start: 2024-10-21 | End: 2024-10-21 | Stop reason: HOSPADM

## 2024-10-21 RX ORDER — TORSEMIDE 20 MG/1
20 TABLET ORAL 2 TIMES DAILY
Status: DISCONTINUED | OUTPATIENT
Start: 2024-10-21 | End: 2024-10-21 | Stop reason: HOSPADM

## 2024-10-21 RX ORDER — METOPROLOL SUCCINATE 50 MG/1
50 TABLET, EXTENDED RELEASE ORAL DAILY
Status: DISCONTINUED | OUTPATIENT
Start: 2024-10-21 | End: 2024-10-21 | Stop reason: HOSPADM

## 2024-10-21 RX ORDER — POTASSIUM CHLORIDE 750 MG/1
40 TABLET, EXTENDED RELEASE ORAL ONCE
Status: COMPLETED | OUTPATIENT
Start: 2024-10-21 | End: 2024-10-21

## 2024-10-21 RX ORDER — ATORVASTATIN CALCIUM 80 MG/1
80 TABLET, FILM COATED ORAL NIGHTLY
Qty: 30 TABLET | Refills: 3 | Status: SHIPPED | OUTPATIENT
Start: 2024-10-21

## 2024-10-21 RX ORDER — SODIUM CHLORIDE 9 MG/ML
INJECTION, SOLUTION INTRAVENOUS PRN
Status: DISCONTINUED | OUTPATIENT
Start: 2024-10-21 | End: 2024-10-21 | Stop reason: HOSPADM

## 2024-10-21 RX ORDER — ASPIRIN 81 MG/1
81 TABLET, CHEWABLE ORAL DAILY
Status: DISCONTINUED | OUTPATIENT
Start: 2024-10-22 | End: 2024-10-21 | Stop reason: HOSPADM

## 2024-10-21 RX ORDER — ACETAMINOPHEN 650 MG/1
650 SUPPOSITORY RECTAL EVERY 6 HOURS PRN
Status: DISCONTINUED | OUTPATIENT
Start: 2024-10-21 | End: 2024-10-21 | Stop reason: HOSPADM

## 2024-10-21 RX ORDER — ALBUTEROL SULFATE 90 UG/1
2 INHALANT RESPIRATORY (INHALATION) EVERY 6 HOURS PRN
Status: DISCONTINUED | OUTPATIENT
Start: 2024-10-21 | End: 2024-10-21

## 2024-10-21 RX ORDER — POTASSIUM CHLORIDE 7.45 MG/ML
10 INJECTION INTRAVENOUS PRN
Status: DISCONTINUED | OUTPATIENT
Start: 2024-10-21 | End: 2024-10-21 | Stop reason: HOSPADM

## 2024-10-21 RX ORDER — ENOXAPARIN SODIUM 150 MG/ML
1 INJECTION SUBCUTANEOUS 2 TIMES DAILY
Status: DISCONTINUED | OUTPATIENT
Start: 2024-10-21 | End: 2024-10-21 | Stop reason: HOSPADM

## 2024-10-21 RX ORDER — SODIUM CHLORIDE 0.9 % (FLUSH) 0.9 %
5-40 SYRINGE (ML) INJECTION EVERY 12 HOURS SCHEDULED
Status: DISCONTINUED | OUTPATIENT
Start: 2024-10-21 | End: 2024-10-21 | Stop reason: HOSPADM

## 2024-10-21 RX ORDER — POLYETHYLENE GLYCOL 3350 17 G/17G
17 POWDER, FOR SOLUTION ORAL DAILY PRN
Status: DISCONTINUED | OUTPATIENT
Start: 2024-10-21 | End: 2024-10-21 | Stop reason: HOSPADM

## 2024-10-21 RX ORDER — ACETAMINOPHEN 325 MG/1
650 TABLET ORAL EVERY 6 HOURS PRN
Status: DISCONTINUED | OUTPATIENT
Start: 2024-10-21 | End: 2024-10-21 | Stop reason: HOSPADM

## 2024-10-21 RX ORDER — ATORVASTATIN CALCIUM 80 MG/1
80 TABLET, FILM COATED ORAL NIGHTLY
Status: DISCONTINUED | OUTPATIENT
Start: 2024-10-21 | End: 2024-10-21 | Stop reason: HOSPADM

## 2024-10-21 RX ORDER — SODIUM CHLORIDE, SODIUM LACTATE, POTASSIUM CHLORIDE, CALCIUM CHLORIDE 600; 310; 30; 20 MG/100ML; MG/100ML; MG/100ML; MG/100ML
INJECTION, SOLUTION INTRAVENOUS CONTINUOUS
Status: DISCONTINUED | OUTPATIENT
Start: 2024-10-21 | End: 2024-10-21

## 2024-10-21 RX ORDER — SODIUM CHLORIDE 0.9 % (FLUSH) 0.9 %
5-40 SYRINGE (ML) INJECTION PRN
Status: DISCONTINUED | OUTPATIENT
Start: 2024-10-21 | End: 2024-10-21 | Stop reason: HOSPADM

## 2024-10-21 RX ORDER — ONDANSETRON 2 MG/ML
4 INJECTION INTRAMUSCULAR; INTRAVENOUS EVERY 6 HOURS PRN
Status: DISCONTINUED | OUTPATIENT
Start: 2024-10-21 | End: 2024-10-21

## 2024-10-21 RX ORDER — ALBUTEROL SULFATE 90 UG/1
2 INHALANT RESPIRATORY (INHALATION)
Status: DISCONTINUED | OUTPATIENT
Start: 2024-10-21 | End: 2024-10-21 | Stop reason: HOSPADM

## 2024-10-21 RX ORDER — FAMOTIDINE 20 MG/1
20 TABLET, FILM COATED ORAL 2 TIMES DAILY
Status: DISCONTINUED | OUTPATIENT
Start: 2024-10-21 | End: 2024-10-21 | Stop reason: HOSPADM

## 2024-10-21 RX ORDER — MAGNESIUM SULFATE IN WATER 40 MG/ML
2000 INJECTION, SOLUTION INTRAVENOUS PRN
Status: DISCONTINUED | OUTPATIENT
Start: 2024-10-21 | End: 2024-10-21 | Stop reason: HOSPADM

## 2024-10-21 RX ORDER — ASPIRIN 81 MG/1
324 TABLET, CHEWABLE ORAL ONCE
Status: COMPLETED | OUTPATIENT
Start: 2024-10-21 | End: 2024-10-21

## 2024-10-21 RX ORDER — ONDANSETRON 2 MG/ML
4 INJECTION INTRAMUSCULAR; INTRAVENOUS EVERY 6 HOURS PRN
Status: DISCONTINUED | OUTPATIENT
Start: 2024-10-21 | End: 2024-10-21 | Stop reason: HOSPADM

## 2024-10-21 RX ORDER — ONDANSETRON 4 MG/1
4 TABLET, ORALLY DISINTEGRATING ORAL EVERY 8 HOURS PRN
Status: DISCONTINUED | OUTPATIENT
Start: 2024-10-21 | End: 2024-10-21 | Stop reason: HOSPADM

## 2024-10-21 RX ORDER — NITROGLYCERIN 0.4 MG/1
0.4 TABLET SUBLINGUAL EVERY 5 MIN PRN
Status: DISCONTINUED | OUTPATIENT
Start: 2024-10-21 | End: 2024-10-21 | Stop reason: HOSPADM

## 2024-10-21 RX ORDER — DILTIAZEM HYDROCHLORIDE 120 MG/1
120 CAPSULE, COATED, EXTENDED RELEASE ORAL DAILY
Status: DISCONTINUED | OUTPATIENT
Start: 2024-10-21 | End: 2024-10-21 | Stop reason: HOSPADM

## 2024-10-21 RX ORDER — DILTIAZEM HYDROCHLORIDE 120 MG/1
120 CAPSULE, COATED, EXTENDED RELEASE ORAL DAILY
Qty: 30 CAPSULE | Refills: 3 | Status: SHIPPED | OUTPATIENT
Start: 2024-10-22

## 2024-10-21 RX ORDER — POTASSIUM CHLORIDE 1500 MG/1
40 TABLET, EXTENDED RELEASE ORAL PRN
Status: DISCONTINUED | OUTPATIENT
Start: 2024-10-21 | End: 2024-10-21 | Stop reason: HOSPADM

## 2024-10-21 RX ADMIN — FAMOTIDINE 20 MG: 20 TABLET, FILM COATED ORAL at 09:01

## 2024-10-21 RX ADMIN — SODIUM CHLORIDE, POTASSIUM CHLORIDE, SODIUM LACTATE AND CALCIUM CHLORIDE: 600; 310; 30; 20 INJECTION, SOLUTION INTRAVENOUS at 07:41

## 2024-10-21 RX ADMIN — ONDANSETRON 4 MG: 2 INJECTION, SOLUTION INTRAMUSCULAR; INTRAVENOUS at 02:34

## 2024-10-21 RX ADMIN — ASPIRIN 81 MG 324 MG: 81 TABLET ORAL at 02:34

## 2024-10-21 RX ADMIN — METOPROLOL SUCCINATE 50 MG: 50 TABLET, EXTENDED RELEASE ORAL at 09:01

## 2024-10-21 RX ADMIN — POTASSIUM CHLORIDE 40 MEQ: 750 TABLET, FILM COATED, EXTENDED RELEASE ORAL at 04:44

## 2024-10-21 RX ADMIN — DILTIAZEM HYDROCHLORIDE 120 MG: 120 CAPSULE, COATED, EXTENDED RELEASE ORAL at 14:24

## 2024-10-21 RX ADMIN — ACETAMINOPHEN 650 MG: 325 TABLET ORAL at 12:23

## 2024-10-21 RX ADMIN — PANTOPRAZOLE SODIUM 40 MG: 40 TABLET, DELAYED RELEASE ORAL at 09:02

## 2024-10-21 RX ADMIN — SODIUM CHLORIDE, PRESERVATIVE FREE 10 ML: 5 INJECTION INTRAVENOUS at 09:08

## 2024-10-21 RX ADMIN — TORSEMIDE 20 MG: 20 TABLET ORAL at 09:01

## 2024-10-21 ASSESSMENT — PAIN SCALES - GENERAL
PAINLEVEL_OUTOF10: 6
PAINLEVEL_OUTOF10: 0

## 2024-10-21 ASSESSMENT — LIFESTYLE VARIABLES
HOW MANY STANDARD DRINKS CONTAINING ALCOHOL DO YOU HAVE ON A TYPICAL DAY: PATIENT DOES NOT DRINK
HOW OFTEN DO YOU HAVE A DRINK CONTAINING ALCOHOL: NEVER

## 2024-10-21 ASSESSMENT — ENCOUNTER SYMPTOMS
SHORTNESS OF BREATH: 1
RHINORRHEA: 0
CHEST TIGHTNESS: 1
DIARRHEA: 0
NAUSEA: 1
COUGH: 0
VOMITING: 1
ABDOMINAL PAIN: 0

## 2024-10-21 ASSESSMENT — PAIN DESCRIPTION - ORIENTATION: ORIENTATION: MID

## 2024-10-21 ASSESSMENT — HEART SCORE: ECG: NORMAL

## 2024-10-21 ASSESSMENT — PAIN - FUNCTIONAL ASSESSMENT: PAIN_FUNCTIONAL_ASSESSMENT: 0-10

## 2024-10-21 NOTE — PROGRESS NOTES
10/21/24 1216   RT Protocol   History Pulmonary Disease 2   Respiratory pattern 0   Breath sounds 0   Cough 0   Bronchodilator Assessment Score 2

## 2024-10-21 NOTE — H&P
Hospital Medicine History & Physical      PCP: Judy Hicks MD    Date of Admission: 10/21/2024    Date of Service: Pt seen/examined and Admitted with expected LOS greater than two midnights due to medical therapy.     Chief Complaint:      Chest pain, palpitations    History Of Present Illness:    This is a 39-year-old female who presented to the emergency room with a chief complaint of having intermittent right-sided chest pain associated with palpitations which has been going on for the last few days.  Patient's past medical history significant for chronic atrial fibrillation, history of cardiac surgery as an infant, patient currently follows up with cardiology Dr. HOPKINS as an outpatient    Patient was noticed to have paroxysmal A-fib with RVR upon arrival to the emergency room.  Patient's pacemaker was interrogated    She has had episodes of SVT, atrial tachycardia and atrial fibrillation  Patient's troponin level currently within normal limits    In the ED patient was given aspirin  Potassium  39-year-old female, pacemaker in place history of A-fib, history of cardiac surgery as an infant, followed by Dr. HOPKINS here. Has been having intermittent episodes of chest pain, palpitations for the past several days. Interrogated her device she has had episodes of SVT, atrial tachycardia and atrial fibrillation her troponin is negative at this time. Aspirin given. Potassium was 3.0 this was replaced orally       The workup in the emergency room notable for:      Twelve-lead EKG reviewed by me done in the emergency room shows normal sinus rhythm, ventricular rate 88 bpm   QTc 503    axis regular no ST-T elevation or depression right bundle branch block there are T wave inversions in leads V3 to V4  No significant changes and compared to previous EKG      Past Medical History:          Diagnosis Date    A-fib ([facility])     Abnormal ECG     Asthma     Atrial fibrillation (HCC)     CHF (congestive heart failure) ([facility])

## 2024-10-21 NOTE — CONSULTS
Ventricle: Normal left ventricular systolic function with a visually estimated EF of 50 - 55%. Not well visualized. Left ventricle size is normal. Normal wall thickness. Normal wall motion. Normal diastolic function.    Right Ventricle: Normal systolic function. TAPSE is 2.1 cm. RV Peak S' is 10 cm/s.    Aortic Valve: Mild narrowing of the LVOT secondary to interventricular tunnel repair.  Peak velocity 2.5 m/s. Aortic valve opens normally.    Pulmonic Valve: Trace regurgitation. Mildly increased gradient but not stenosis.    Tricuspid Valve: Moderate regurgitation. Normal RVSP. Est RA pressure is 3 mmHg. The estimated RVSP is 24 mmHg.    Image quality is technically difficult. Technically difficult study.     CXR  No acute process       Scheduled Meds:   sodium chloride flush  5-40 mL IntraVENous 2 times per day    famotidine  20 mg Oral BID    [START ON 10/22/2024] aspirin  81 mg Oral Daily    atorvastatin  80 mg Oral Nightly    enoxaparin  1 mg/kg SubCUTAneous BID    metoprolol succinate  50 mg Oral Daily    pantoprazole  40 mg Oral QAM AC    torsemide  20 mg Oral BID     Continuous Infusions:   sodium chloride       PRN Meds:.sodium chloride flush, sodium chloride, potassium chloride **OR** potassium alternative oral replacement **OR** potassium chloride, magnesium sulfate, ondansetron **OR** ondansetron, acetaminophen **OR** acetaminophen, polyethylene glycol, nitroGLYCERIN, albuterol sulfate HFA     Prior to Admission medications    Medication Sig Start Date End Date Taking? Authorizing Provider   metoprolol succinate (TOPROL XL) 100 MG extended release tablet Take 0.5 tablets by mouth daily 6/11/24   Jacy Carrera APRN - CNP   ibuprofen (ADVIL;MOTRIN) 600 MG tablet Take 1 tablet by mouth every 6 hours as needed for Pain 6/11/24   Jacy Carrera APRN - CNP   omeprazole (PRILOSEC) 20 MG delayed release capsule Take 1 capsule by mouth every morning (before breakfast) 12/2/23   Jose Vásquez PA 
motion likely   due to postop stat. Otherwise no regional WMA.   Normal diastolic function.   The right ventricle is not well visualized, but appears at least moderately   dilated with mildly diminished systolic function.   The aortic valve leaflets are not well visualized. Mildly increased   gradients noted through the LVOT and aortic valve without evidence of   obstruction. Trivial eccentric aortic insufficiency versus possible tiny   residual VSD (image 142).   Mild narrowing of the LVOT secondary to interventricular tunnel repair.   Moderate tricuspid regurgitation.   Mild flow acceleration through pulmonary valve, but no significant gradient.   Mild-to-moderate pulmonic insufficiency.   A bubble study was performed with valsalva maneuver and fails to show   evidence of shunting.   RVSP estimated in normal range   33 mmHg. Normal RA pressure by IVC dynamics.     ISCHEMIC EVALUATION / CATH RESULTS  Cath:   No results found for this or any previous visit.      CARDIAC RHYTHM ASSESSMENT:  Holter/Event monitor  No results found for this or any previous visit.     EP studies / cardioversions   Pacemaker [facility] transmission - I personally reviewed strips with Dr. Tomlin  Very low burden of AT, overall < 0.1%, runs as fast as 150 bpm, longest only 19 sec     03/22/22 S/p ILR removal, EPS, and RFA of typical atrial flutter      8/2/19:  Insertion of dual chamber pacemaker for CHB     **  NOTE  **  During CMR at [facility] they had to turn her pacmaker off due to complications with her rhythm (see below for more details)     MRI note:  Comments:   Robyn is here today in [facility] for cardiac MRI. She is an adult patient that follows with Dr. Martinez and her device care is followed with Dr. Manuel at [facility]. Programmed AAI<=>DDD with LRL of 60BPM with mode switch ON. 15.3% AP. Counters show 13 monitored VT, 313 fast A&V, and 11 monitored AT/AF episodes. Capture thresholds, lead impedances, and sensing within normal limits. Outputs

## 2024-10-21 NOTE — ED PROVIDER NOTES
[facility] EMERGENCY DEPARTMENT  EMERGENCY DEPARTMENT ENCOUNTER        Pt Name: Robyn López  MRN: [id number]  Birthdate 1985  Date of evaluation: 10/21/2024  Provider: Fadumo Hernandez PA-C  PCP: Judy Hicks MD  Note Started: 2:57 AM EDT 10/21/24       I have seen and evaluated this patient with my supervising physician Harshal Galarza DO.      CHIEF COMPLAINT       Chief Complaint   Patient presents with    Chest Pain     Pt presents to the ED with multiple complaints including c/o chest pain, nausea, vomiting, bilateral otalgia and pharyngitis \"for days\". Pt states chest pain radiates to her right neck and right upper back. Pt has a cardiac history including pacemaker placement ([employer]tr[employer]) and pt states she thinks it may have fired tonight, but \"is not completely sure.\" Pt denies diarrhea, headache and fever.     Nausea    Emesis    Otalgia    Pharyngitis       HISTORY OF PRESENT ILLNESS: 1 or more Elements     History From: Patient  Limitations to history : None    Robyn López is a 39 y.o. female who presents to the emergency department today for evaluation for concerns of chest pain, and shortness of breath.  The patient has a past medical history of a VSD repair as a child, she states that she had to have an additional surgery because of an enlarged heart muscle in 2020.  Patient reports that she was admitted in 2019, and had a pacemaker placed as she was in A-fib with RVR.  Patient reports that she has had the pacemaker in place.  Patient reports that for the past 3 to 4 days, she has had some \"fluttering\" to her chest.  She reports that she has had some various pains to her chest, and she reports that yesterday while walking she noticed the pain to her right upper back, which radiated toward her neck.  She reports that she laid down, the discomfort went away.  Patient reports that today while she was driving, she states that she noticed a fluttering in her chest, and she 
completely sure.\" Pt denies diarrhea, headache and fever. ), Nausea, Emesis, Otalgia, and Pharyngitis as described above.  History obtained from patient and [facility].  Prior medical history reviewed.  Initial vital signs reviewed and within normal limits.  Patient nontoxic appearing and stable.  My physical exam matches as documented in [facility] note.    History and physical exam consistent with differential including, but not limited to:   ACS, arrhythmia, anxiety, GERD, pneumonia, and viral syndrome.  I reviewed work up with CRISTAL and agree with plan.  Patient has long cardiac history and has an established cardiologist.  Patient to be evaluated with blood work, EKG, and chest x-ray.  Will give aspirin loading dose.  Patient declined zofran.    I independently interpreted EKG (see full interpretation above), lab work (per ED course), and imaging (per ED course).     The following risk stratification rule(s) were utilized in medical decision making:  -   HEART score:   2- low risk, disagree as patient has long history of heart disease  Please see detailed scoring above.    ED Course as of 10/21/24 0537   Mon Oct 21, 2024   0117 EKG 12 Lead  Right bundle branch block.  Otherwise normal EKG. [PB]   0208 XR CHEST (2 VW)  Negative for acute findings. [PB]   0243 [employer] representative calls and reports the patient is having runs of V. tach, SVT, and atrial tachycardia.  Will plan for admission. [PB]   0304 WBC: 6.5 [PB]   0305 Hemoglobin Quant(!): 11.5 [PB]   0305 D-Dimer, Quant: 0.49 [PB]   0305 Preg, Serum: Negative [PB]   0325 [facility]AL to page for admission. [PB]      ED Course User Index  [PB] Harshal Galarza DO   All charted times are approximate.    During the patient's ED course, the patient was given:  Medications   ondansetron (ZOFRAN) injection 4 mg (4 mg IntraVENous Given 10/21/24 0234)   aspirin chewable tablet 324 mg (324 mg Oral Given 10/21/24 0234)   potassium chloride (KLOR-CON) extended release tablet 40 mEq (40

## 2024-10-21 NOTE — ED NOTES
How does patient ambulate?   [x]Low Fall Risk (ambulates by themselves without support)  []Stand by assist   []Contact Guard   []Front wheel walker  []Wheelchair   []Steady  []Bed bound  []History of Lower Extremity Amputation  []Unknown, did not assess in the emergency department   How does patient take pills?  [x]Whole with Water  []Crushed in applesauce  []Crushed in pudding  []Other  []Unknown no oral medications were given in the ED  Is patient alert?   [x]Alert  []Drowsy but responds to voice  []Doesn't respond to voice but responds to painful stimuli  []Unresponsive  Is patient oriented?   [x]To person  [x]To place  [x]To time  [x]To situation  []Confused  []Agitated  []Follows commands  If patient is disoriented or from a Skill Nursing Facility has family been notified of admission?   []Yes   [x]No  Patient belongings?   [x]Cell phone  [x]Wallet   []Dentures  [x]Clothing  Any specific patient or family belongings/needs/dynamics?     Miscellaneous comments/pending orders?       If there are any additional questions please reach out to the Emergency Department.

## 2024-10-21 NOTE — PROGRESS NOTES
Incentive Spirometry education and demonstration completed by Respiratory Therapy Yes      Response to education: Very Good     Teaching Time: 5 minutes    Minimum Predicted Vital Capacity - 662 mL.  Patient's Actual Vital Capacity - 800 mL. Turning over to Nursing for routine follow-up Yes.    Comments:     Electronically signed by Marcus Sellers RCP on 10/21/2024 at 12:16 PM

## 2024-10-21 NOTE — PROGRESS NOTES
Notified bedside RN that stress test will be done tomorrow due to no extra doses isotope. Instructed to keep patient NPO after midnight and stop caffeine and decaf now.

## 2024-10-21 NOTE — CARE COORDINATION
CM reviewed chart for discharge planning.    Pt from home. Has PCP and insurance.     Cardiology consult pending.    CM will follow for discharge plan and needs.      Amanda Ribeiro RN, BSN  [phone]

## 2024-10-21 NOTE — ED NOTES
Patient oriented to room and ED throughput process.  Safety measures with ED bed locked in lowest position and call light in reach.  Patient educated on all orders, including any medications.  Patient educated on chief complaint/symptoms. Patient encouraged to ask questions regarding care, medications or treatment plan.  Patient aware of how to reach staff with questions/concerns.

## 2024-10-22 ENCOUNTER — PATIENT MESSAGE (OUTPATIENT)
Dept: CARDIOLOGY CLINIC | Age: 39
End: 2024-10-22

## 2024-10-22 DIAGNOSIS — R07.9 CHEST PAIN, UNSPECIFIED TYPE: Primary | ICD-10-CM

## 2024-10-24 NOTE — DISCHARGE SUMMARY
improved. Stress test rec by cardio  but pt prefers to do it as OP.  Started cardiazem , will cont meoptolol  for  episodes of parox, Atrial tachycardia seen on pacemaker. May  need ablation in future     Chief Complaint   Patient presents with    Chest Pain     Pt presents to the ED with multiple complaints including c/o chest pain, nausea, vomiting, bilateral otalgia and pharyngitis \"for days\". Pt states chest pain radiates to her right neck and right upper back. Pt has a cardiac history including pacemaker placement ([employer]) and pt states she thinks it may have fired tonight, but \"is not completely sure.\" Pt denies diarrhea, headache and fever.     Nausea    Vomiting    Ear Pain    Pharyngitis        Core Measures:   Acute Myocardial Infarction,  Heart failure,  CVA    Consults: cardiology    Imaging Studies:  XR CHEST (2 VW)    Result Date: 10/21/2024  EXAMINATION: TWO XRAY VIEWS OF THE CHEST 10/21/2024 1:36 am COMPARISON: 06/09/2024 HISTORY: ORDERING SYSTEM PROVIDED HISTORY: Cough, chest pain TECHNOLOGIST PROVIDED HISTORY: Reason for exam:->Cough, chest pain FINDINGS: Postoperative changes are seen in the chest.  No cardiomegaly.  No focal consolidation or pleural effusion.  No pneumothorax is identified.  No acute osseous abnormality is identified.  No pulmonary edema.     No acute cardiopulmonary process is identified.         Other Significant Diagnostic Studies: As described above     Treatments: As described above    Disposition: home    Discharge Medications:       Medication List        START taking these medications      atorvastatin 80 MG tablet  Commonly known as: LIPITOR  Take 1 tablet by mouth nightly     dilTIAZem 120 MG extended release capsule  Commonly known as: CARDIZEM CD  Take 1 capsule by mouth daily            CONTINUE taking these medications      albuterol sulfate  (90 Base) MCG/ACT inhaler  Commonly known as: Proventil HFA  Inhale 2 puffs into the lungs every 6 hours as needed

## 2024-11-07 ENCOUNTER — PATIENT MESSAGE (OUTPATIENT)
Dept: CARDIOLOGY CLINIC | Age: 39
End: 2024-11-07

## 2024-11-08 NOTE — TELEPHONE ENCOUNTER
Called and spoke with patient. Discussed starting to take the cardizem to see if this helps with her symptoms. She v/u. She will reach back out if she is still having issues after starting the medication.

## 2024-11-08 NOTE — TELEPHONE ENCOUNTER
1 VT mon episode on 10/23/24 x 7s, 13 SVT episodes last on 11/3/24 longest 2m 59s on 11/3/24, 1 fast A/V episode on 11/7/24 x 40s max A/V 167/167 bpm. Device functioning WNL

## 2024-11-19 ENCOUNTER — HOSPITAL ENCOUNTER (OUTPATIENT)
Age: 39
Discharge: HOME | End: 2024-11-21
Attending: INTERNAL MEDICINE
Payer: COMMERCIAL

## 2024-11-19 ENCOUNTER — TELEPHONE (OUTPATIENT)
Dept: CARDIOLOGY CLINIC | Age: 39
End: 2024-11-19

## 2024-11-19 VITALS
SYSTOLIC BLOOD PRESSURE: 104 MMHG | HEIGHT: 69 IN | BODY MASS INDEX: 39.25 KG/M2 | HEART RATE: 74 BPM | DIASTOLIC BLOOD PRESSURE: 46 MMHG | RESPIRATION RATE: 16 BRPM | WEIGHT: 265 LBS

## 2024-11-19 DIAGNOSIS — R07.9 CHEST PAIN, UNSPECIFIED TYPE: ICD-10-CM

## 2024-11-19 LAB
ECHO BSA: 2.42 M2
NUC STRESS EJECTION FRACTION: 68 %
NUC STRESS LV EDV: 109 ML (ref 56–104)
NUC STRESS LV ESV: 35 ML (ref 19–49)
NUC STRESS LV MASS: 145 G
STRESS BASELINE DIAS BP: 76 MMHG
STRESS BASELINE HR: 73 BPM
STRESS BASELINE SYS BP: 104 MMHG
STRESS ESTIMATED WORKLOAD: 7 METS
STRESS EXERCISE DUR MIN: 4 MIN
STRESS EXERCISE DUR SEC: 0 SEC
STRESS O2 SAT PEAK: 97 %
STRESS O2 SAT REST: 97 %
STRESS PEAK DIAS BP: 63 MMHG
STRESS PEAK SYS BP: 154 MMHG
STRESS PERCENT HR ACHIEVED: 87 %
STRESS POST PEAK HR: 157 BPM
STRESS RATE PRESSURE PRODUCT: ABNORMAL BPM*MMHG
STRESS TARGET HR: 181 BPM
TID: 1.1

## 2024-11-19 PROCEDURE — 93017 CV STRESS TEST TRACING ONLY: CPT

## 2024-11-19 PROCEDURE — 3430000000 HC RX DIAGNOSTIC RADIOPHARMACEUTICAL: Performed by: INTERNAL MEDICINE

## 2024-11-19 PROCEDURE — A9502 TC99M TETROFOSMIN: HCPCS | Performed by: INTERNAL MEDICINE

## 2024-11-19 PROCEDURE — 78452 HT MUSCLE IMAGE SPECT MULT: CPT

## 2024-11-19 RX ADMIN — TETROFOSMIN 32.5 MILLICURIE: 1.38 INJECTION, POWDER, LYOPHILIZED, FOR SOLUTION INTRAVENOUS at 08:55

## 2024-11-19 RX ADMIN — TETROFOSMIN 10.5 MILLICURIE: 1.38 INJECTION, POWDER, LYOPHILIZED, FOR SOLUTION INTRAVENOUS at 07:51

## 2024-11-19 NOTE — TELEPHONE ENCOUNTER
Per [facility] - Normal stress test     I called and spoke to Robyn and notified of the above.  She verbalized understanding.  States that she has not taken any of the Cardizem since she left the hospital because she has misplaced the medication bottle.  She has only been taking the Metoprolol.  States that she is okay but has noticed a racing heart.  She noticed it a few days ago.  States that it is periodically through the week.  Maybe 3-5 times.  States that she can feel like it is about to start racing and is able to sit down and take it easy and it does not go into a full episode.  She is wanting to know if you want her to start taking the Cardizem or wait and play it by ear and day by day.  She will need a new prescription sent to [facility] [address] if you do want her to take it.  States that this time of year is when she does have a lot of flare ups.

## 2024-11-20 RX ORDER — DILTIAZEM HYDROCHLORIDE 120 MG/1
120 CAPSULE, COATED, EXTENDED RELEASE ORAL DAILY
Qty: 90 CAPSULE | Refills: 3 | Status: SHIPPED | OUTPATIENT
Start: 2024-11-20

## 2024-11-20 NOTE — TELEPHONE ENCOUNTER
Per MIKKI - Ok to restart cardizem 120 CD - she has a pacemaker so she won't go too slow     She can take it prn once a day for now - ok to just monitor symptoms and only take it if the tachycardia is really bothering her.      I called and spoke to Robyn and advised of the above.  She verbalized understanding.  Prescription sent to Pharmacy as requested.  Denied any further questions and/or concerns.

## 2024-12-17 NOTE — PROGRESS NOTES
used smokeless tobacco. She reports that she does not currently use alcohol. She reports that she does not use drugs.     Family History:  Reviewed. Reviewed. No family history of SCD.      Relevant and available labs, and cardiovascular diagnostics reviewed.   Reviewed.     I independently reviewed relevant and available cardiac diagnostic tests ECG, CXR, Echo, Stress test, Device interrogation, Holter, CT scan.    Outside medical records via Care everywhere reviewed and summarized in H&P above.    Complex medical condition with multiple medical problems affecting prognosis and outcome of EP interventions       - The patient is counseled to follow a low salt diet to assure blood pressure remains controlled for cardiovascular risk factor modification.   - The patient is counseled to avoid excess caffeine, and energy drinks as this may exacerbated ectopy and arrhythmia.   - The patient is counseled to get regular exercise 3-5 times per week to control       All questions and concerns were addressed to the patient/family. Alternatives to my treatment were discussed. I have discussed the above stated plan and the patient verbalized understanding and agreed with the plan.    Scribe attestation: This note was scribed in the presence of Orville Manuel MD by Jane Grissom RN    I, Dr. Orville Manuel personally performed the services described in this documentation as scribed by RN in my presence, and it is both accurate and complete.         NOTE: This report was transcribed using voice recognition software. Every effort was made to ensure accuracy, however, inadvertent computerized transcription errors may be present.     Orville Manuel MD, [facility], [facility]   Office: [phone]  Fax: [phone]

## 2024-12-19 ENCOUNTER — NURSE ONLY (OUTPATIENT)
Dept: CARDIOLOGY CLINIC | Age: 39
End: 2024-12-19

## 2024-12-19 ENCOUNTER — OFFICE VISIT (OUTPATIENT)
Dept: CARDIOLOGY CLINIC | Age: 39
End: 2024-12-19
Payer: COMMERCIAL

## 2024-12-19 VITALS
OXYGEN SATURATION: 98 % | HEART RATE: 78 BPM | DIASTOLIC BLOOD PRESSURE: 78 MMHG | BODY MASS INDEX: 39.99 KG/M2 | HEIGHT: 69 IN | WEIGHT: 270 LBS | SYSTOLIC BLOOD PRESSURE: 122 MMHG

## 2024-12-19 DIAGNOSIS — Q24.9 CONGENITAL HEART DISEASE: ICD-10-CM

## 2024-12-19 DIAGNOSIS — I47.19 PAT (PAROXYSMAL ATRIAL TACHYCARDIA) (HCC): ICD-10-CM

## 2024-12-19 DIAGNOSIS — I48.0 PAROXYSMAL A-FIB (HCC): Primary | ICD-10-CM

## 2024-12-19 DIAGNOSIS — I48.0 PAF (PAROXYSMAL ATRIAL FIBRILLATION) (HCC): ICD-10-CM

## 2024-12-19 DIAGNOSIS — I44.2 CHB (COMPLETE HEART BLOCK) (HCC): ICD-10-CM

## 2024-12-19 DIAGNOSIS — I50.32 CHRONIC DIASTOLIC HEART FAILURE (HCC): ICD-10-CM

## 2024-12-19 DIAGNOSIS — Z95.0 PACEMAKER: Primary | ICD-10-CM

## 2024-12-19 DIAGNOSIS — I47.29 NSVT (NONSUSTAINED VENTRICULAR TACHYCARDIA) (HCC): ICD-10-CM

## 2024-12-19 DIAGNOSIS — I48.3 TYPICAL ATRIAL FLUTTER (HCC): ICD-10-CM

## 2024-12-19 DIAGNOSIS — I48.0 PAROXYSMAL A-FIB (HCC): ICD-10-CM

## 2024-12-19 DIAGNOSIS — I47.11 INAPPROPRIATE SINUS TACHYCARDIA (HCC): ICD-10-CM

## 2024-12-19 PROCEDURE — 93000 ELECTROCARDIOGRAM COMPLETE: CPT | Performed by: INTERNAL MEDICINE

## 2024-12-19 PROCEDURE — 99214 OFFICE O/P EST MOD 30 MIN: CPT | Performed by: INTERNAL MEDICINE

## 2025-03-03 NOTE — PROGRESS NOTES
episodes, which are very short.  Given lack of any sustained VT or syncope on her current regimen of metoprolol, I don't think we have a clear indication for an upgrade to ICD.  TR - insignificant, no need for surgery    3/19/25  NO medication changes today   Continue risk factor modifications   Call for any new or worsening symptoms.     All new cardiac testing and lab results personally reviewed by me during this office visit and discussed with patient.    Patient counseled on lifestyle modification, diet, and exercise.    Follow up: 18 months  - will try to stagger visits in conjunction with Dr. Martinez at [facility] so that one of us is seeing her about every 6 months.  She will need an echo at least yearly moving forward, either here or [facility].    Jose A Sr MD  Cardiologist [facility]    Scribe Attestation: This note was scribed in the presence of Dr. Orin MD by Tg Gaviria, ESPERANZA.    Physician Attestation  The scribe wrote this note in the presence of me (Jose A Sr MD).  The scribe may have prepopulated components of this note with my historical  intellectual property under my direct supervision.  Any additions to this intellectual property were performed in my presence and at my direction.  Furthermore, the content and accuracy of this note have been reviewed by me with edits by me as needed.  Jose A Sr MD 3/19/2025 4:46 PM

## 2025-03-05 PROBLEM — E66.01 CLASS 2 SEVERE OBESITY DUE TO EXCESS CALORIES WITH SERIOUS COMORBIDITY AND BODY MASS INDEX (BMI) OF 39.0 TO 39.9 IN ADULT: Status: ACTIVE | Noted: 2025-03-05

## 2025-03-05 PROBLEM — Q20.1 DORV (DOUBLE OUTLET RIGHT VENTRICLE): Status: ACTIVE | Noted: 2025-03-05

## 2025-03-05 PROBLEM — E66.812 CLASS 2 SEVERE OBESITY DUE TO EXCESS CALORIES WITH SERIOUS COMORBIDITY AND BODY MASS INDEX (BMI) OF 39.0 TO 39.9 IN ADULT: Status: ACTIVE | Noted: 2025-03-05

## 2025-03-19 ENCOUNTER — OFFICE VISIT (OUTPATIENT)
Dept: CARDIOLOGY CLINIC | Age: 40
End: 2025-03-19
Payer: COMMERCIAL

## 2025-03-19 VITALS
BODY MASS INDEX: 40.82 KG/M2 | WEIGHT: 275.6 LBS | HEIGHT: 69 IN | OXYGEN SATURATION: 98 % | HEART RATE: 82 BPM | SYSTOLIC BLOOD PRESSURE: 120 MMHG | DIASTOLIC BLOOD PRESSURE: 70 MMHG

## 2025-03-19 DIAGNOSIS — I44.2 CHB (COMPLETE HEART BLOCK) (HCC): ICD-10-CM

## 2025-03-19 DIAGNOSIS — Z95.0 PACEMAKER: ICD-10-CM

## 2025-03-19 DIAGNOSIS — I47.19 ATRIAL TACHYCARDIA: ICD-10-CM

## 2025-03-19 DIAGNOSIS — E66.01 CLASS 2 SEVERE OBESITY DUE TO EXCESS CALORIES WITH SERIOUS COMORBIDITY AND BODY MASS INDEX (BMI) OF 39.0 TO 39.9 IN ADULT: ICD-10-CM

## 2025-03-19 DIAGNOSIS — Q24.9 CONGENITAL HEART DISEASE: Primary | ICD-10-CM

## 2025-03-19 DIAGNOSIS — I48.3 TYPICAL ATRIAL FLUTTER (HCC): ICD-10-CM

## 2025-03-19 DIAGNOSIS — I36.1 NONRHEUMATIC TRICUSPID VALVE REGURGITATION: ICD-10-CM

## 2025-03-19 DIAGNOSIS — I50.32 CHRONIC DIASTOLIC HEART FAILURE (HCC): ICD-10-CM

## 2025-03-19 DIAGNOSIS — Q20.1 DORV (DOUBLE OUTLET RIGHT VENTRICLE): ICD-10-CM

## 2025-03-19 DIAGNOSIS — E66.812 CLASS 2 SEVERE OBESITY DUE TO EXCESS CALORIES WITH SERIOUS COMORBIDITY AND BODY MASS INDEX (BMI) OF 39.0 TO 39.9 IN ADULT: ICD-10-CM

## 2025-03-19 PROCEDURE — 99214 OFFICE O/P EST MOD 30 MIN: CPT | Performed by: INTERNAL MEDICINE

## 2025-03-19 PROCEDURE — G2211 COMPLEX E/M VISIT ADD ON: HCPCS | Performed by: INTERNAL MEDICINE

## 2025-03-21 ENCOUNTER — TELEPHONE (OUTPATIENT)
Dept: CARDIOLOGY CLINIC | Age: 40
End: 2025-03-21

## 2025-03-21 DIAGNOSIS — I47.29 NSVT (NONSUSTAINED VENTRICULAR TACHYCARDIA) (HCC): Primary | ICD-10-CM

## 2025-03-21 NOTE — TELEPHONE ENCOUNTER
Blood pressure 142/90 74- standing  Sitting-146/83, 76  Feels like her heart is racing with slight chest pain and SOB. Does not feel like she is going to pass out. States hat her feeling in her chest it feels weird- unable to describe. Patient states that she feels shaky/jittery. Feeling nauseous with headache. Heart fluttering started yesterday and still happening.     Patient states that she has been trying to send a transmission since 6am and having trouble getting it to go through.

## 2025-03-21 NOTE — TELEPHONE ENCOUNTER
Pt calling In letting MEL know she has been experiencing heart flutters/heart racing since yesterday morning. She has been experiencing nauseous ness, headache, and SOB when talking as well. Pt denies CP. Please call and advise next steps   Thank you

## 2025-03-21 NOTE — TELEPHONE ENCOUNTER
We received remote transmission from patient's monitor at home. Transmission shows normal sensing and pacing function.   Lead Trends stable  Current ECG shows ASVS-Sinus at 71 bpm  Since 12/19/2024   16 monitored VT episodes noted- last noted on 3/12/2025, longest 7 seconds  92 Fast A& V episodes noted-last on 3/16/2025-SVT&ST    AT/AF episodes noted-last on 2/16/2025, longest ~ 2 1/2 minutes  PVC runs 1.2 per hour  PVC singles 6.8 per hour  EP will review. See interrogation in [facility] or [facility]  for more details.    Please advise. Thanks

## 2025-03-21 NOTE — TELEPHONE ENCOUNTER
Called pt.back regarding her symptoms pt.states that she's having heart fluttering and SOB since yesterday but she did not track her b/p & hr asked her to check while I'm on phone she said she's at work ,she denied other cardiac symptoms beside fluttering and SOB also asked her to call back with her vitals she said it will be after 4 pm.

## 2025-03-21 NOTE — TELEPHONE ENCOUNTER
Order placed for CMP per . He would like her to get this done to see if electrolytes could be abnormal and causing symptoms

## 2025-03-21 NOTE — TELEPHONE ENCOUNTER
She has a dual PPM. She should send a device transmission when she has these symptoms. She also reported similar symptoms at 3/19/2024 office visit with Dr. Sr. Would like to know if symptoms have changed.

## 2025-03-22 ENCOUNTER — HOSPITAL ENCOUNTER (OUTPATIENT)
Age: 40
Discharge: HOME | End: 2025-03-22
Payer: COMMERCIAL

## 2025-03-22 DIAGNOSIS — I47.29 NSVT (NONSUSTAINED VENTRICULAR TACHYCARDIA) (HCC): ICD-10-CM

## 2025-03-22 LAB
ALBUMIN SERPL-MCNC: 4.5 G/DL (ref 3.4–5)
ALBUMIN/GLOB SERPL: 1.3 {RATIO} (ref 1.1–2.2)
ALP SERPL-CCNC: 58 U/L (ref 40–129)
ALT SERPL-CCNC: 22 U/L (ref 10–40)
ANION GAP SERPL CALCULATED.3IONS-SCNC: 11 MMOL/L (ref 3–16)
AST SERPL-CCNC: 21 U/L (ref 15–37)
BILIRUB SERPL-MCNC: 0.4 MG/DL (ref 0–1)
BUN SERPL-MCNC: 15 MG/DL (ref 7–20)
CALCIUM SERPL-MCNC: 9.4 MG/DL (ref 8.3–10.6)
CHLORIDE SERPL-SCNC: 97 MMOL/L (ref 99–110)
CO2 SERPL-SCNC: 25 MMOL/L (ref 21–32)
CREAT SERPL-MCNC: 0.9 MG/DL (ref 0.6–1.1)
GFR SERPLBLD CREATININE-BSD FMLA CKD-EPI: 83 ML/MIN/{1.73_M2}
GLUCOSE SERPL-MCNC: 91 MG/DL (ref 70–99)
POTASSIUM SERPL-SCNC: 3.5 MMOL/L (ref 3.5–5.1)
PROT SERPL-MCNC: 8.1 G/DL (ref 6.4–8.2)
SODIUM SERPL-SCNC: 133 MMOL/L (ref 136–145)

## 2025-03-22 PROCEDURE — 80053 COMPREHEN METABOLIC PANEL: CPT

## 2025-03-22 PROCEDURE — 36415 COLL VENOUS BLD VENIPUNCTURE: CPT

## 2025-03-24 ENCOUNTER — TELEPHONE (OUTPATIENT)
Dept: CARDIOLOGY CLINIC | Age: 40
End: 2025-03-24

## 2025-03-24 PROCEDURE — 93296 REM INTERROG EVL PM/IDS: CPT | Performed by: INTERNAL MEDICINE

## 2025-03-24 PROCEDURE — 93294 REM INTERROG EVL PM/LDLS PM: CPT | Performed by: INTERNAL MEDICINE

## 2025-03-24 RX ORDER — SPIRONOLACTONE 25 MG/1
25 TABLET ORAL DAILY
Qty: 90 TABLET | Refills: 3 | Status: SHIPPED | OUTPATIENT
Start: 2025-03-24

## 2025-03-24 NOTE — TELEPHONE ENCOUNTER
Spoke with patient she was wanting to talk with Tg about lab results she had don this weekend. Patient was concerned about results and stop taking her Torsemide. She was unsure if she should continue taking it do to her results.  Patient also reported that she had sent in a remote transmission on Friday.

## 2025-03-24 NOTE — TELEPHONE ENCOUNTER
Pt was feeling \"funny\" in her chest, stopped her Torsemide to see if that helped. She did feel better yesterday. I advised of WAKs orders to start Spironolactone and reviewed med instructions. Pt v/u and agreeable to plan. Pt was concerned about her sodium and chloride levels. Reviewed her labs with her and that her levels are mild and possible causes. I advised that if WAK had anything to add I would let her know.

## 2025-03-26 RX ORDER — METOPROLOL SUCCINATE 50 MG/1
TABLET, EXTENDED RELEASE ORAL
Qty: 135 TABLET | OUTPATIENT
Start: 2025-03-26

## 2025-04-03 ENCOUNTER — TELEPHONE (OUTPATIENT)
Dept: CARDIOLOGY CLINIC | Age: 40
End: 2025-04-03

## 2025-04-03 NOTE — TELEPHONE ENCOUNTER
Can you please schedule an appt with Dr. Martínez.  I believe she will want it at .      Thank you

## 2025-04-05 ENCOUNTER — HOSPITAL ENCOUNTER (INPATIENT)
Age: 40
LOS: 1 days | Discharge: HOME | DRG: 309 | End: 2025-04-08
Attending: STUDENT IN AN ORGANIZED HEALTH CARE EDUCATION/TRAINING PROGRAM | Admitting: INTERNAL MEDICINE
Payer: COMMERCIAL

## 2025-04-05 ENCOUNTER — APPOINTMENT (OUTPATIENT)
Dept: GENERAL RADIOLOGY | Age: 40
DRG: 309 | End: 2025-04-05
Payer: COMMERCIAL

## 2025-04-05 DIAGNOSIS — R06.02 SHORTNESS OF BREATH: ICD-10-CM

## 2025-04-05 DIAGNOSIS — R07.89 CHEST DISCOMFORT: ICD-10-CM

## 2025-04-05 DIAGNOSIS — E16.2 HYPOGLYCEMIA: ICD-10-CM

## 2025-04-05 DIAGNOSIS — R00.2 PALPITATIONS: ICD-10-CM

## 2025-04-05 DIAGNOSIS — I47.20 V TACH (HCC): Primary | ICD-10-CM

## 2025-04-05 PROBLEM — E66.813 CLASS 3 OBESITY: Status: ACTIVE | Noted: 2025-04-05

## 2025-04-05 LAB
ALBUMIN SERPL-MCNC: 3.9 G/DL (ref 3.4–5)
ALBUMIN/GLOB SERPL: 1.1 {RATIO} (ref 1.1–2.2)
ALP SERPL-CCNC: 52 U/L (ref 40–129)
ALT SERPL-CCNC: 13 U/L (ref 10–40)
ANION GAP SERPL CALCULATED.3IONS-SCNC: 12 MMOL/L (ref 3–16)
APTT BLD: 24.5 SEC (ref 22.1–36.4)
AST SERPL-CCNC: 23 U/L (ref 15–37)
BACTERIA URNS QL MICRO: NORMAL /HPF
BASOPHILS # BLD: 0.1 K/UL (ref 0–0.2)
BASOPHILS NFR BLD: 1.1 %
BILIRUB SERPL-MCNC: 0.5 MG/DL (ref 0–1)
BILIRUB UR QL STRIP.AUTO: NEGATIVE
BUN SERPL-MCNC: 11 MG/DL (ref 7–20)
CALCIUM SERPL-MCNC: 9 MG/DL (ref 8.3–10.6)
CHLORIDE SERPL-SCNC: 107 MMOL/L (ref 99–110)
CLARITY UR: ABNORMAL
CO2 SERPL-SCNC: 22 MMOL/L (ref 21–32)
COLOR UR: YELLOW
CREAT SERPL-MCNC: 0.9 MG/DL (ref 0.6–1.1)
DEPRECATED RDW RBC AUTO: 14 % (ref 12.4–15.4)
EOSINOPHIL # BLD: 0.2 K/UL (ref 0–0.6)
EOSINOPHIL NFR BLD: 3.2 %
EPI CELLS #/AREA URNS AUTO: 3 /HPF (ref 0–5)
GFR SERPLBLD CREATININE-BSD FMLA CKD-EPI: 83 ML/MIN/{1.73_M2}
GLUCOSE BLD-MCNC: 95 MG/DL (ref 70–99)
GLUCOSE BLD-MCNC: 95 MG/DL (ref 70–99)
GLUCOSE SERPL-MCNC: 68 MG/DL (ref 70–99)
GLUCOSE UR STRIP.AUTO-MCNC: NEGATIVE MG/DL
HCT VFR BLD AUTO: 37.6 % (ref 36–48)
HGB BLD-MCNC: 12.1 G/DL (ref 12–16)
HGB UR QL STRIP.AUTO: NEGATIVE
HYALINE CASTS #/AREA URNS AUTO: 0 /LPF (ref 0–8)
INR PPP: 1 (ref 0.85–1.15)
KETONES UR STRIP.AUTO-MCNC: NEGATIVE MG/DL
LEUKOCYTE ESTERASE UR QL STRIP.AUTO: ABNORMAL
LYMPHOCYTES # BLD: 1.7 K/UL (ref 1–5.1)
LYMPHOCYTES NFR BLD: 29.4 %
MAGNESIUM SERPL-MCNC: 2.03 MG/DL (ref 1.8–2.4)
MCH RBC QN AUTO: 24.4 PG (ref 26–34)
MCHC RBC AUTO-ENTMCNC: 32.2 G/DL (ref 31–36)
MCV RBC AUTO: 75.8 FL (ref 80–100)
MONOCYTES # BLD: 0.7 K/UL (ref 0–1.3)
MONOCYTES NFR BLD: 12.1 %
NEUTROPHILS # BLD: 3.2 K/UL (ref 1.7–7.7)
NEUTROPHILS NFR BLD: 54.2 %
NITRITE UR QL STRIP.AUTO: NEGATIVE
NT-PROBNP SERPL-MCNC: 180 PG/ML (ref 0–124)
PERFORMED ON: NORMAL
PERFORMED ON: NORMAL
PH UR STRIP.AUTO: 7 [PH] (ref 5–8)
PLATELET # BLD AUTO: 316 K/UL (ref 135–450)
PMV BLD AUTO: 7.7 FL (ref 5–10.5)
POTASSIUM SERPL-SCNC: 3.4 MMOL/L (ref 3.5–5.1)
PROT SERPL-MCNC: 7.4 G/DL (ref 6.4–8.2)
PROT UR STRIP.AUTO-MCNC: NEGATIVE MG/DL
PROTHROMBIN TIME: 13.4 SEC (ref 11.9–14.9)
RBC # BLD AUTO: 4.97 M/UL (ref 4–5.2)
RBC CLUMPS #/AREA URNS AUTO: 2 /HPF (ref 0–4)
SODIUM SERPL-SCNC: 141 MMOL/L (ref 136–145)
SP GR UR STRIP.AUTO: 1.02 (ref 1–1.03)
TROPONIN, HIGH SENSITIVITY: <6 NG/L (ref 0–14)
TROPONIN, HIGH SENSITIVITY: <6 NG/L (ref 0–14)
TSH SERPL DL<=0.005 MIU/L-ACNC: 1.65 UIU/ML (ref 0.27–4.2)
UA COMPLETE W REFLEX CULTURE PNL UR: ABNORMAL
UA DIPSTICK W REFLEX MICRO PNL UR: YES
URN SPEC COLLECT METH UR: ABNORMAL
UROBILINOGEN UR STRIP-ACNC: 1 E.U./DL
WBC # BLD AUTO: 5.9 K/UL (ref 4–11)
WBC #/AREA URNS AUTO: 2 /HPF (ref 0–5)

## 2025-04-05 PROCEDURE — 80053 COMPREHEN METABOLIC PANEL: CPT

## 2025-04-05 PROCEDURE — 84443 ASSAY THYROID STIM HORMONE: CPT

## 2025-04-05 PROCEDURE — 83735 ASSAY OF MAGNESIUM: CPT

## 2025-04-05 PROCEDURE — 84484 ASSAY OF TROPONIN QUANT: CPT

## 2025-04-05 PROCEDURE — G0378 HOSPITAL OBSERVATION PER HR: HCPCS

## 2025-04-05 PROCEDURE — 85730 THROMBOPLASTIN TIME PARTIAL: CPT

## 2025-04-05 PROCEDURE — 85610 PROTHROMBIN TIME: CPT

## 2025-04-05 PROCEDURE — 6370000000 HC RX 637 (ALT 250 FOR IP): Performed by: HOSPITALIST

## 2025-04-05 PROCEDURE — 83880 ASSAY OF NATRIURETIC PEPTIDE: CPT

## 2025-04-05 PROCEDURE — 2500000003 HC RX 250 WO HCPCS: Performed by: HOSPITALIST

## 2025-04-05 PROCEDURE — 99285 EMERGENCY DEPT VISIT HI MDM: CPT

## 2025-04-05 PROCEDURE — 81001 URINALYSIS AUTO W/SCOPE: CPT

## 2025-04-05 PROCEDURE — 93005 ELECTROCARDIOGRAM TRACING: CPT | Performed by: STUDENT IN AN ORGANIZED HEALTH CARE EDUCATION/TRAINING PROGRAM

## 2025-04-05 PROCEDURE — 71045 X-RAY EXAM CHEST 1 VIEW: CPT

## 2025-04-05 PROCEDURE — 85025 COMPLETE CBC W/AUTO DIFF WBC: CPT

## 2025-04-05 RX ORDER — METOPROLOL SUCCINATE 50 MG/1
50 TABLET, EXTENDED RELEASE ORAL DAILY
Status: DISCONTINUED | OUTPATIENT
Start: 2025-04-06 | End: 2025-04-08 | Stop reason: HOSPADM

## 2025-04-05 RX ORDER — ONDANSETRON 4 MG/1
4 TABLET, ORALLY DISINTEGRATING ORAL EVERY 8 HOURS PRN
Status: DISCONTINUED | OUTPATIENT
Start: 2025-04-05 | End: 2025-04-08 | Stop reason: HOSPADM

## 2025-04-05 RX ORDER — POTASSIUM CHLORIDE 1500 MG/1
40 TABLET, EXTENDED RELEASE ORAL PRN
Status: DISCONTINUED | OUTPATIENT
Start: 2025-04-05 | End: 2025-04-08 | Stop reason: HOSPADM

## 2025-04-05 RX ORDER — ONDANSETRON 2 MG/ML
4 INJECTION INTRAMUSCULAR; INTRAVENOUS EVERY 6 HOURS PRN
Status: DISCONTINUED | OUTPATIENT
Start: 2025-04-05 | End: 2025-04-08 | Stop reason: HOSPADM

## 2025-04-05 RX ORDER — TORSEMIDE 20 MG/1
20 TABLET ORAL 2 TIMES DAILY
Status: DISCONTINUED | OUTPATIENT
Start: 2025-04-06 | End: 2025-04-08

## 2025-04-05 RX ORDER — ACETAMINOPHEN 325 MG/1
650 TABLET ORAL EVERY 6 HOURS PRN
Status: DISCONTINUED | OUTPATIENT
Start: 2025-04-05 | End: 2025-04-08 | Stop reason: HOSPADM

## 2025-04-05 RX ORDER — IBUPROFEN 200 MG
400 TABLET ORAL EVERY 6 HOURS PRN
COMMUNITY

## 2025-04-05 RX ORDER — GLUCAGON 1 MG/ML
1 KIT INJECTION PRN
Status: DISCONTINUED | OUTPATIENT
Start: 2025-04-05 | End: 2025-04-05 | Stop reason: HOSPADM

## 2025-04-05 RX ORDER — POLYETHYLENE GLYCOL 3350 17 G/17G
17 POWDER, FOR SOLUTION ORAL DAILY PRN
Status: DISCONTINUED | OUTPATIENT
Start: 2025-04-05 | End: 2025-04-08 | Stop reason: HOSPADM

## 2025-04-05 RX ORDER — MAGNESIUM SULFATE IN WATER 40 MG/ML
2000 INJECTION, SOLUTION INTRAVENOUS PRN
Status: DISCONTINUED | OUTPATIENT
Start: 2025-04-05 | End: 2025-04-08 | Stop reason: HOSPADM

## 2025-04-05 RX ORDER — ACETAMINOPHEN 650 MG/1
650 SUPPOSITORY RECTAL EVERY 6 HOURS PRN
Status: DISCONTINUED | OUTPATIENT
Start: 2025-04-05 | End: 2025-04-08 | Stop reason: HOSPADM

## 2025-04-05 RX ORDER — TOPIRAMATE 25 MG/1
25 TABLET, FILM COATED ORAL 2 TIMES DAILY
Status: DISCONTINUED | OUTPATIENT
Start: 2025-04-05 | End: 2025-04-08 | Stop reason: HOSPADM

## 2025-04-05 RX ORDER — ENOXAPARIN SODIUM 100 MG/ML
30 INJECTION SUBCUTANEOUS 2 TIMES DAILY
Status: DISCONTINUED | OUTPATIENT
Start: 2025-04-06 | End: 2025-04-08 | Stop reason: HOSPADM

## 2025-04-05 RX ORDER — DEXTROSE MONOHYDRATE 100 MG/ML
INJECTION, SOLUTION INTRAVENOUS CONTINUOUS PRN
Status: DISCONTINUED | OUTPATIENT
Start: 2025-04-05 | End: 2025-04-05 | Stop reason: HOSPADM

## 2025-04-05 RX ORDER — POTASSIUM CHLORIDE 1500 MG/1
40 TABLET, EXTENDED RELEASE ORAL ONCE
Status: COMPLETED | OUTPATIENT
Start: 2025-04-05 | End: 2025-04-05

## 2025-04-05 RX ORDER — SODIUM CHLORIDE 0.9 % (FLUSH) 0.9 %
5-40 SYRINGE (ML) INJECTION PRN
Status: DISCONTINUED | OUTPATIENT
Start: 2025-04-05 | End: 2025-04-08 | Stop reason: HOSPADM

## 2025-04-05 RX ORDER — METOPROLOL TARTRATE 25 MG/1
25 TABLET, FILM COATED ORAL EVERY EVENING
Status: DISCONTINUED | OUTPATIENT
Start: 2025-04-05 | End: 2025-04-07

## 2025-04-05 RX ORDER — SODIUM CHLORIDE 9 MG/ML
INJECTION, SOLUTION INTRAVENOUS PRN
Status: DISCONTINUED | OUTPATIENT
Start: 2025-04-05 | End: 2025-04-08 | Stop reason: HOSPADM

## 2025-04-05 RX ORDER — POTASSIUM CHLORIDE 7.45 MG/ML
10 INJECTION INTRAVENOUS PRN
Status: DISCONTINUED | OUTPATIENT
Start: 2025-04-05 | End: 2025-04-08 | Stop reason: HOSPADM

## 2025-04-05 RX ORDER — ASPIRIN 81 MG/1
81 TABLET ORAL DAILY
Status: DISCONTINUED | OUTPATIENT
Start: 2025-04-06 | End: 2025-04-08 | Stop reason: HOSPADM

## 2025-04-05 RX ORDER — SODIUM CHLORIDE 0.9 % (FLUSH) 0.9 %
5-40 SYRINGE (ML) INJECTION EVERY 12 HOURS SCHEDULED
Status: DISCONTINUED | OUTPATIENT
Start: 2025-04-05 | End: 2025-04-08 | Stop reason: HOSPADM

## 2025-04-05 RX ADMIN — TOPIRAMATE 25 MG: 25 TABLET, FILM COATED ORAL at 21:51

## 2025-04-05 RX ADMIN — POTASSIUM CHLORIDE 40 MEQ: 1500 TABLET, EXTENDED RELEASE ORAL at 21:51

## 2025-04-05 RX ADMIN — SODIUM CHLORIDE, PRESERVATIVE FREE 10 ML: 5 INJECTION INTRAVENOUS at 21:53

## 2025-04-05 RX ADMIN — METOPROLOL TARTRATE 25 MG: 25 TABLET, FILM COATED ORAL at 21:51

## 2025-04-05 ASSESSMENT — PAIN SCALES - GENERAL
PAINLEVEL_OUTOF10: 0
PAINLEVEL_OUTOF10: 0
PAINLEVEL_OUTOF10: 7

## 2025-04-05 ASSESSMENT — LIFESTYLE VARIABLES
HOW OFTEN DO YOU HAVE A DRINK CONTAINING ALCOHOL: NEVER
HOW MANY STANDARD DRINKS CONTAINING ALCOHOL DO YOU HAVE ON A TYPICAL DAY: PATIENT DOES NOT DRINK

## 2025-04-05 ASSESSMENT — PAIN - FUNCTIONAL ASSESSMENT: PAIN_FUNCTIONAL_ASSESSMENT: 0-10

## 2025-04-05 NOTE — ED PROVIDER NOTES
the patient is NOT to be included for SEP-1 Core Measure due to:  Infection is not suspected    Screenings     [place] Coma Scale  Eye Opening: Spontaneous  Best Verbal Response: Oriented  Best Motor Response: Obeys commands  [place] Coma Scale Score: 15             Patient Referrals:  No follow-up provider specified.    Discharge Medications:  New Prescriptions    No medications on file       FINAL IMPRESSION  1. V tach (HCC)    2. Palpitations    3. Shortness of breath    4. Chest discomfort    5. Hypoglycemia        Blood pressure 98/70, pulse 77, temperature 98.2 °F (36.8 °C), temperature source Oral, resp. rate 17, height 1.753 m (5' 9\"), weight 124.7 kg (275 lb), SpO2 98%, unknown if currently breastfeeding.     For further details of Robyn López's emergency department encounter, please see documentation by advanced practice provider      Alonzo Nieves MD  04/05/25 0818    
  atrial tachycardia/fibrillation, and paroxysmal complete AV block  (2019) pacemaker insertion by Dr. Orville Manuel.    CC/HPI Summary, DDx, ED Course, and Reassessment: This patient presents to the emergency department after episode of palpitations, shortness of breath, diaphoresis and lightheadedness.  After interrogation of Medtronic, it appears that she did have a short run of ventricular tachycardia.  At this time EKG appears stable.  Patient understands and agrees with plan for admission.  She was mildly hypoglycemic and was given oral glucose and glucose is improved currently.  Troponin is negative.    Disposition Considerations (tests considered but not done, Admit vs D/C, Shared Decision Making, Pt Expectation of Test or Tx.): admit       I am the Primary Clinician of Record.  FINAL IMPRESSION      1. V tach (HCC)    2. Palpitations    3. Shortness of breath    4. Chest discomfort    5. Hypoglycemia          DISPOSITION/PLAN     DISPOSITION Decision To Admit 04/05/2025 06:51:21 PM   DISPOSITION CONDITION Stable           PATIENT REFERRED TO:  No follow-up provider specified.    DISCHARGE MEDICATIONS:  New Prescriptions    No medications on file       DISCONTINUED MEDICATIONS:  Discontinued Medications    No medications on file              (Please note that portions of this note were completed with a voice recognition program.  Efforts were made to edit the dictations but occasionally words are mis-transcribed.)    Kana Lo PA-C (electronically signed)        Kana Lo PA-C  04/05/25 1929

## 2025-04-05 NOTE — H&P
V2.0  History and Physical      Name:  Robyn López /Age/Sex: 1985  (39 y.o. female)   MRN & CSN:  [id number] & [id number] Encounter Date/Time: 2025 7:40 PM EDT   Location:  [facility] PCP: Judy Hicks MD       Hospital Day: 1    Assessment and Plan:   Robyn López is a 39 y.o. female with a pmh of congenital ventricular septal defect with a pacemaker, history of loop recorder history of defibrillation who presents with NSVT (nonsustained ventricular tachycardia) ([facility])    Hospital Problems           Last Modified POA    * (Principal) NSVT (nonsustained ventricular tachycardia) ([facility]) 2025 Yes    Pacemaker 2025 Yes    Overview Signed 2019 10:28 AM by Macey Medrano   Patient has Medtronic Dual Chamber Pacemaker- [place]         Typical atrial flutter ([facility]) 2025 Yes    Hypokalemia 2025 Yes    Paroxysmal A-fib ([facility]) 2025 Yes    Class 3 obesity 2025 Yes    Ventricular tachycardia ([facility]) 2025 Yes    Hypoglycemia 2025 Yes       Plan:  Observe patient at intermediate care unit.  Telemetry.  Consult cardiology.  Hypokalemia-replace potassium.  Check magnesium.  Check TSH with reflex free T4.  Hypoglycemia-p.o. given at the emergency department.  Trend blood glucose.  Hypoglycemic protocol in place  Class III obesity-BMI of 40.6 kg/m².  Complicates care.  Lifestyle modification recommended.  Placement [id number].      Advance care planning:  Advanced care planning was discussed with patient for a total of  16 minutes.  Full code, DNR CC, DNR CCA, power of  and living will were discussed.  Patient elected to be a full code.   Disposition:   Current Living situation: Home  Expected Disposition: Home  Estimated D/C: 2 days    Diet Regular cardiac   DVT Prophylaxis [x] Lovenox, []  Heparin, [] SCDs, [] Ambulation,  [] Eliquis, [] Xarelto, [] Coumadin   Code Status Full code   Surrogate Decision Maker/ POA Mother who was at the bedside     Personally reviewed Lab

## 2025-04-05 NOTE — ED NOTES
Patient Name: Robyn López  : 1985 39 y.o.  MRN: [id number]  ED Room #: ED-0019/19     Chief complaint:   Chief Complaint   Patient presents with    Irregular Heart Beat     Pt states that she was in the [employer] store and heart started racing and SOB. Pt has medtronic pace maker       Hospital Problem/Diagnosis:   Hospital Problems           Last Modified POA    * (Principal) NSVT (nonsustained ventricular tachycardia) ([facility]) 2025 Yes    Pacemaker 2025 Yes    Overview Signed 2019 10:28 AM by Macey Medrano   Patient has Medtronic Dual Chamber Pacemaker- [place]         Typical atrial flutter ([facility]) 2025 Yes    Hypokalemia 2025 Yes    Paroxysmal A-fib ([facility]) 2025 Yes    Class 3 obesity 2025 Yes    Ventricular tachycardia ([facility]) 2025 Yes    Hypoglycemia 2025 Yes         O2 Flow Rate:    (if applicable)  Cardiac Rhythm:   (if applicable)  Active LDA's:   Peripheral IV 25 Right Antecubital (Active)   Site Assessment Clean, dry & intact 25 1612            How does patient ambulate? Low Fall Risk (Ambulates by themselves without support    2. How does patient take pills? Whole with Water    3. Is patient alert? Alert    4. Is patient oriented? To Person, To Place, To Time, and To Situation    5.   Patient arrived from:  home  Facility Name: ___________________________________________    6. If patient is disoriented or from a Skill Nursing Facility has family been notified of admission? No    7. Patient belongings? Belongings: Cell Phone and Clothing    Disposition of belongings? Kept with Patient     8. Any specific patient or family belongings/needs/dynamics?   a. none    9. Miscellaneous comments/pending orders?  a. ED orders complete      If there are any additional questions please reach out to the Emergency Department.

## 2025-04-06 LAB
ANION GAP SERPL CALCULATED.3IONS-SCNC: 11 MMOL/L (ref 3–16)
BASOPHILS # BLD: 0 K/UL (ref 0–0.2)
BASOPHILS NFR BLD: 0.2 %
BUN SERPL-MCNC: 11 MG/DL (ref 7–20)
CALCIUM SERPL-MCNC: 9 MG/DL (ref 8.3–10.6)
CHLORIDE SERPL-SCNC: 107 MMOL/L (ref 99–110)
CO2 SERPL-SCNC: 22 MMOL/L (ref 21–32)
CREAT SERPL-MCNC: 0.7 MG/DL (ref 0.6–1.1)
DEPRECATED RDW RBC AUTO: 14.2 % (ref 12.4–15.4)
EKG ATRIAL RATE: 102 BPM
EKG DIAGNOSIS: NORMAL
EKG P AXIS: 58 DEGREES
EKG P-R INTERVAL: 174 MS
EKG Q-T INTERVAL: 376 MS
EKG QRS DURATION: 146 MS
EKG QTC CALCULATION (BAZETT): 490 MS
EKG R AXIS: 6 DEGREES
EKG T AXIS: 9 DEGREES
EKG VENTRICULAR RATE: 102 BPM
EOSINOPHIL # BLD: 0.2 K/UL (ref 0–0.6)
EOSINOPHIL NFR BLD: 3.6 %
GFR SERPLBLD CREATININE-BSD FMLA CKD-EPI: >90 ML/MIN/{1.73_M2}
GLUCOSE BLD-MCNC: 112 MG/DL (ref 70–99)
GLUCOSE SERPL-MCNC: 111 MG/DL (ref 70–99)
HCT VFR BLD AUTO: 36.7 % (ref 36–48)
HGB BLD-MCNC: 12.1 G/DL (ref 12–16)
LYMPHOCYTES # BLD: 2 K/UL (ref 1–5.1)
LYMPHOCYTES NFR BLD: 43.4 %
MCH RBC QN AUTO: 24.4 PG (ref 26–34)
MCHC RBC AUTO-ENTMCNC: 32.9 G/DL (ref 31–36)
MCV RBC AUTO: 74.2 FL (ref 80–100)
MONOCYTES # BLD: 0.5 K/UL (ref 0–1.3)
MONOCYTES NFR BLD: 10.6 %
NEUTROPHILS # BLD: 2 K/UL (ref 1.7–7.7)
NEUTROPHILS NFR BLD: 42.2 %
PERFORMED ON: ABNORMAL
PLATELET # BLD AUTO: 329 K/UL (ref 135–450)
PMV BLD AUTO: 7.8 FL (ref 5–10.5)
POTASSIUM SERPL-SCNC: 3.7 MMOL/L (ref 3.5–5.1)
RBC # BLD AUTO: 4.94 M/UL (ref 4–5.2)
SODIUM SERPL-SCNC: 140 MMOL/L (ref 136–145)
WBC # BLD AUTO: 4.7 K/UL (ref 4–11)

## 2025-04-06 PROCEDURE — 6370000000 HC RX 637 (ALT 250 FOR IP): Performed by: NURSE PRACTITIONER

## 2025-04-06 PROCEDURE — 6370000000 HC RX 637 (ALT 250 FOR IP): Performed by: HOSPITALIST

## 2025-04-06 PROCEDURE — G0378 HOSPITAL OBSERVATION PER HR: HCPCS

## 2025-04-06 PROCEDURE — 93010 ELECTROCARDIOGRAM REPORT: CPT | Performed by: INTERNAL MEDICINE

## 2025-04-06 PROCEDURE — 85025 COMPLETE CBC W/AUTO DIFF WBC: CPT

## 2025-04-06 PROCEDURE — 80048 BASIC METABOLIC PNL TOTAL CA: CPT

## 2025-04-06 PROCEDURE — 2500000003 HC RX 250 WO HCPCS: Performed by: HOSPITALIST

## 2025-04-06 PROCEDURE — 99223 1ST HOSP IP/OBS HIGH 75: CPT | Performed by: INTERNAL MEDICINE

## 2025-04-06 RX ORDER — KETOROLAC TROMETHAMINE 30 MG/ML
30 INJECTION, SOLUTION INTRAMUSCULAR; INTRAVENOUS EVERY 6 HOURS PRN
Status: DISCONTINUED | OUTPATIENT
Start: 2025-04-06 | End: 2025-04-06

## 2025-04-06 RX ORDER — IBUPROFEN 400 MG/1
400 TABLET, FILM COATED ORAL EVERY 6 HOURS PRN
Status: DISCONTINUED | OUTPATIENT
Start: 2025-04-06 | End: 2025-04-08 | Stop reason: HOSPADM

## 2025-04-06 RX ADMIN — ASPIRIN 81 MG: 81 TABLET, COATED ORAL at 08:21

## 2025-04-06 RX ADMIN — TORSEMIDE 20 MG: 20 TABLET ORAL at 16:57

## 2025-04-06 RX ADMIN — METOPROLOL SUCCINATE 50 MG: 50 TABLET, EXTENDED RELEASE ORAL at 13:04

## 2025-04-06 RX ADMIN — TOPIRAMATE 25 MG: 25 TABLET, FILM COATED ORAL at 21:12

## 2025-04-06 RX ADMIN — TOPIRAMATE 25 MG: 25 TABLET, FILM COATED ORAL at 08:21

## 2025-04-06 RX ADMIN — SODIUM CHLORIDE, PRESERVATIVE FREE 10 ML: 5 INJECTION INTRAVENOUS at 08:24

## 2025-04-06 RX ADMIN — METOPROLOL TARTRATE 25 MG: 25 TABLET, FILM COATED ORAL at 21:15

## 2025-04-06 RX ADMIN — ACETAMINOPHEN 650 MG: 325 TABLET ORAL at 12:30

## 2025-04-06 RX ADMIN — TORSEMIDE 20 MG: 20 TABLET ORAL at 08:21

## 2025-04-06 RX ADMIN — SODIUM CHLORIDE, PRESERVATIVE FREE 10 ML: 5 INJECTION INTRAVENOUS at 21:16

## 2025-04-06 RX ADMIN — ACETAMINOPHEN 650 MG: 325 TABLET ORAL at 03:55

## 2025-04-06 RX ADMIN — IBUPROFEN 400 MG: 400 TABLET, FILM COATED ORAL at 17:36

## 2025-04-06 ASSESSMENT — PAIN SCALES - GENERAL
PAINLEVEL_OUTOF10: 0
PAINLEVEL_OUTOF10: 0
PAINLEVEL_OUTOF10: 5
PAINLEVEL_OUTOF10: 4
PAINLEVEL_OUTOF10: 3

## 2025-04-06 ASSESSMENT — PAIN DESCRIPTION - LOCATION
LOCATION: HEAD

## 2025-04-06 ASSESSMENT — PAIN DESCRIPTION - ORIENTATION: ORIENTATION: MID

## 2025-04-06 ASSESSMENT — PAIN DESCRIPTION - DESCRIPTORS: DESCRIPTORS: ACHING

## 2025-04-06 NOTE — CONSULTS
[facility]   Electrophysiology   Date: 4/6/2025  Reason for Consultation: Atrial flutter    Consult Requesting Physician: Glenna Mendez MD     Chief Complaint   Patient presents with    Irregular Heart Beat     Pt states that she was in the [employer] store and heart started racing and SOB. Pt has medtronic pace maker         CC: Irregular heart rhythm    HPI: Robyn López is a 39 y.o. female past medical history significant for complex congenital heart disease, s/p repair in 1985 at [facility], AT/a flutter s/p ablation 2022 history of paroxysmal atrial tachycardia, s/p pacemaker implantation 2019, HFpEF, sickle cell trait, asthma PVCs and NSVT.    She was walking in a shopping mall yesterday in [place] when suddenly started having palpitation.  She noted her heart was racing, did not feel well,  had some rest and then palpitation lasted for about 15 minutes.  This was early afternoon around 2 PM according to the patient.  She then came to the emergency room and has been admitted to the hospital.  There is report of NSVT according to the ER pacemaker check however records is not available.     Patient used to be on sotalol which was stopped by Dr. Manuel.  She is on metoprolol and is compliant with her medication.    She has lost significant weight and is trying to lose more weight.  Remains active and works full-time.    Assessment:   Palpitation  Atrial tachycardia   3/22/2022: History of ablation of typical CTI atrial flutter, Additional ablation of focal atrial tachycardia    History of high grade AV block   S/p dual-chamber pacemaker  Complex congenital heart disease   Double outlet right ventricle  1985 - Complete DORV repair by transatrial intraventricular baffle closure of ventricular septal defect such that left ventricle ejects into aorta and right ventricle into pulmonary artery at [facility] by Dr. Zacarias Lara   8/19/2000 - Division of right ventricular outflow muscle bundles at

## 2025-04-07 ENCOUNTER — TELEPHONE (OUTPATIENT)
Dept: CARDIOLOGY CLINIC | Age: 40
End: 2025-04-07

## 2025-04-07 ENCOUNTER — APPOINTMENT (OUTPATIENT)
Age: 40
DRG: 309 | End: 2025-04-07
Payer: COMMERCIAL

## 2025-04-07 LAB
GLUCOSE BLD-MCNC: 103 MG/DL (ref 70–99)
PERFORMED ON: ABNORMAL

## 2025-04-07 PROCEDURE — G0378 HOSPITAL OBSERVATION PER HR: HCPCS

## 2025-04-07 PROCEDURE — 6370000000 HC RX 637 (ALT 250 FOR IP): Performed by: HOSPITALIST

## 2025-04-07 PROCEDURE — 6360000004 HC RX CONTRAST MEDICATION: Performed by: INTERNAL MEDICINE

## 2025-04-07 PROCEDURE — 6370000000 HC RX 637 (ALT 250 FOR IP): Performed by: NURSE PRACTITIONER

## 2025-04-07 PROCEDURE — 99232 SBSQ HOSP IP/OBS MODERATE 35: CPT | Performed by: NURSE PRACTITIONER

## 2025-04-07 PROCEDURE — C8929 TTE W OR WO FOL WCON,DOPPLER: HCPCS

## 2025-04-07 RX ORDER — DILTIAZEM HYDROCHLORIDE 60 MG/1
60 CAPSULE, EXTENDED RELEASE ORAL 2 TIMES DAILY
Status: DISCONTINUED | OUTPATIENT
Start: 2025-04-07 | End: 2025-04-08 | Stop reason: HOSPADM

## 2025-04-07 RX ORDER — SCOPOLAMINE 1 MG/3D
1 PATCH, EXTENDED RELEASE TRANSDERMAL
Status: DISCONTINUED | OUTPATIENT
Start: 2025-04-07 | End: 2025-04-08 | Stop reason: HOSPADM

## 2025-04-07 RX ORDER — FLUTICASONE PROPIONATE 50 MCG
1 SPRAY, SUSPENSION (ML) NASAL DAILY PRN
Status: DISCONTINUED | OUTPATIENT
Start: 2025-04-07 | End: 2025-04-08 | Stop reason: HOSPADM

## 2025-04-07 RX ADMIN — TORSEMIDE 20 MG: 20 TABLET ORAL at 09:25

## 2025-04-07 RX ADMIN — TOPIRAMATE 25 MG: 25 TABLET, FILM COATED ORAL at 21:43

## 2025-04-07 RX ADMIN — SULFUR HEXAFLUORIDE 2 ML: 60.7; .19; .19 INJECTION, POWDER, LYOPHILIZED, FOR SUSPENSION INTRAVENOUS; INTRAVESICAL at 17:04

## 2025-04-07 RX ADMIN — TORSEMIDE 20 MG: 20 TABLET ORAL at 18:52

## 2025-04-07 RX ADMIN — ASPIRIN 81 MG: 81 TABLET, COATED ORAL at 09:25

## 2025-04-07 RX ADMIN — METOPROLOL SUCCINATE 50 MG: 50 TABLET, EXTENDED RELEASE ORAL at 09:25

## 2025-04-07 RX ADMIN — TOPIRAMATE 25 MG: 25 TABLET, FILM COATED ORAL at 09:25

## 2025-04-07 RX ADMIN — DILTIAZEM HYDROCHLORIDE 60 MG: 60 CAPSULE, EXTENDED RELEASE ORAL at 16:09

## 2025-04-07 ASSESSMENT — PAIN SCALES - GENERAL: PAINLEVEL_OUTOF10: 0

## 2025-04-07 NOTE — CARE COORDINATION
Discharge Planning Assessment:   Patient from home, currently in  Observation with an anticipated short hospitalization length of stay. Chart reviewed and it appears that patient has - None needs at this time.     PCP;  Judy Hicks MD    Insurance; Samantha SILVA    Discussed with patient’s nurse and requested that case management be notified when /if additional discharge needs are identified.     *Case management will continue to follow progress and update discharge plan as needed.

## 2025-04-07 NOTE — PLAN OF CARE
Problem: Chronic Conditions and Co-morbidities  Goal: Patient's chronic conditions and co-morbidity symptoms are monitored and maintained or improved  Outcome: Progressing     Problem: Discharge Planning  Goal: Discharge to home or other facility with appropriate resources  Outcome: Progressing     Problem: Pain  Goal: Verbalizes/displays adequate comfort level or baseline comfort level  Outcome: Progressing     Problem: Safety - Adult  Goal: Free from fall injury  Outcome: Progressing     Problem: Cardiovascular - Adult  Goal: Maintains optimal cardiac output and hemodynamic stability  Outcome: Progressing  Flowsheets (Taken 4/6/2025 2317)  Maintains optimal cardiac output and hemodynamic stability:   Monitor blood pressure and heart rate   Assess for signs of decreased cardiac output   Monitor urine output and notify Licensed Independent Practitioner for values outside of normal range  Goal: Absence of cardiac dysrhythmias or at baseline  Outcome: Progressing  Flowsheets (Taken 4/6/2025 2317)  Absence of cardiac dysrhythmias or at baseline:   Monitor cardiac rate and rhythm   Assess for signs of decreased cardiac output   Administer antiarrhythmia medication and electrolyte replacement as ordered  Note: Echo ordered.  EP following.   Telemetry.  Will continue to monitor.

## 2025-04-08 VITALS
HEIGHT: 69 IN | BODY MASS INDEX: 40.69 KG/M2 | TEMPERATURE: 98.7 F | RESPIRATION RATE: 18 BRPM | DIASTOLIC BLOOD PRESSURE: 87 MMHG | OXYGEN SATURATION: 98 % | SYSTOLIC BLOOD PRESSURE: 113 MMHG | WEIGHT: 274.69 LBS | HEART RATE: 92 BPM

## 2025-04-08 LAB
ANION GAP SERPL CALCULATED.3IONS-SCNC: 13 MMOL/L (ref 3–16)
BASOPHILS # BLD: 0.1 K/UL (ref 0–0.2)
BASOPHILS NFR BLD: 0.9 %
BUN SERPL-MCNC: 13 MG/DL (ref 7–20)
CALCIUM SERPL-MCNC: 8.7 MG/DL (ref 8.3–10.6)
CHLORIDE SERPL-SCNC: 103 MMOL/L (ref 99–110)
CO2 SERPL-SCNC: 21 MMOL/L (ref 21–32)
CREAT SERPL-MCNC: 0.8 MG/DL (ref 0.6–1.1)
DEPRECATED RDW RBC AUTO: 14 % (ref 12.4–15.4)
ECHO AO ROOT DIAM: 3.4 CM
ECHO AO ROOT INDEX: 1.43 CM/M2
ECHO AR MAX VEL PISA: 2.1 M/S
ECHO AV AREA PEAK VELOCITY: 1 CM2
ECHO AV AREA VTI: 1.1 CM2
ECHO AV AREA/BSA PEAK VELOCITY: 0.4 CM2/M2
ECHO AV AREA/BSA VTI: 0.5 CM2/M2
ECHO AV MEAN GRADIENT: 19 MMHG
ECHO AV MEAN VELOCITY: 2 M/S
ECHO AV PEAK GRADIENT: 36 MMHG
ECHO AV PEAK VELOCITY: 3 M/S
ECHO AV REGURGITANT PHT: 1018 MS
ECHO AV VELOCITY RATIO: 0.4
ECHO AV VTI: 67 CM
ECHO BSA: 2.47 M2
ECHO IVC INSP: 0.7 CM
ECHO IVC PROX: 1.8 CM
ECHO LA AREA 2C: 20.7 CM2
ECHO LA AREA 4C: 18.6 CM2
ECHO LA DIAMETER INDEX: 1.6 CM/M2
ECHO LA DIAMETER: 3.8 CM
ECHO LA MAJOR AXIS: 6.3 CM
ECHO LA MINOR AXIS: 5.7 CM
ECHO LA TO AORTIC ROOT RATIO: 1.12
ECHO LA VOL BP: 56 ML (ref 22–52)
ECHO LA VOL MOD A2C: 63 ML (ref 22–52)
ECHO LA VOL MOD A4C: 46 ML (ref 22–52)
ECHO LA VOL/BSA BIPLANE: 24 ML/M2 (ref 16–34)
ECHO LA VOLUME INDEX MOD A2C: 27 ML/M2 (ref 16–34)
ECHO LA VOLUME INDEX MOD A4C: 19 ML/M2 (ref 16–34)
ECHO LV E' LATERAL VELOCITY: 17.2 CM/S
ECHO LV E' SEPTAL VELOCITY: 9.57 CM/S
ECHO LV EDV A2C: 109 ML
ECHO LV EDV A4C: 121 ML
ECHO LV EDV INDEX A4C: 51 ML/M2
ECHO LV EDV NDEX A2C: 46 ML/M2
ECHO LV EF PHYSICIAN: 60 %
ECHO LV EJECTION FRACTION A2C: 62 %
ECHO LV EJECTION FRACTION A4C: 58 %
ECHO LV EJECTION FRACTION BIPLANE: 59 % (ref 55–100)
ECHO LV ESV A2C: 41 ML
ECHO LV ESV A4C: 51 ML
ECHO LV ESV INDEX A2C: 17 ML/M2
ECHO LV ESV INDEX A4C: 22 ML/M2
ECHO LV FRACTIONAL SHORTENING: 32 % (ref 28–44)
ECHO LV INTERNAL DIMENSION DIASTOLE INDEX: 2.11 CM/M2
ECHO LV INTERNAL DIMENSION DIASTOLIC: 5 CM (ref 3.9–5.3)
ECHO LV INTERNAL DIMENSION SYSTOLIC INDEX: 1.43 CM/M2
ECHO LV INTERNAL DIMENSION SYSTOLIC: 3.4 CM
ECHO LV IVSD: 1 CM (ref 0.6–0.9)
ECHO LV MASS 2D: 169.9 G (ref 67–162)
ECHO LV MASS INDEX 2D: 71.7 G/M2 (ref 43–95)
ECHO LV POSTERIOR WALL DIASTOLIC: 0.9 CM (ref 0.6–0.9)
ECHO LV RELATIVE WALL THICKNESS RATIO: 0.36
ECHO LVOT AREA: 2.5 CM2
ECHO LVOT AV VTI INDEX: 0.44
ECHO LVOT DIAM: 1.8 CM
ECHO LVOT MEAN GRADIENT: 4 MMHG
ECHO LVOT PEAK GRADIENT: 6 MMHG
ECHO LVOT PEAK VELOCITY: 1.2 M/S
ECHO LVOT STROKE VOLUME INDEX: 31.8 ML/M2
ECHO LVOT SV: 75.3 ML
ECHO LVOT VTI: 29.6 CM
ECHO MV AREA VTI: 2.5 CM2
ECHO MV LVOT VTI INDEX: 1.02
ECHO MV MAX VELOCITY: 1.1 M/S
ECHO MV MEAN GRADIENT: 2 MMHG
ECHO MV MEAN VELOCITY: 0.7 M/S
ECHO MV PEAK GRADIENT: 5 MMHG
ECHO MV VTI: 30.2 CM
ECHO PULMONARY ARTERY END DIASTOLIC PRESSURE: 3 MMHG
ECHO PV MAX VELOCITY: 1.5 M/S
ECHO PV MEAN GRADIENT: 4 MMHG
ECHO PV MEAN VELOCITY: 1 M/S
ECHO PV PEAK GRADIENT: 9 MMHG
ECHO PV REGURGITANT MAX VELOCITY: 0.9 M/S
ECHO PV VTI: 32.9 CM
ECHO RA AREA 4C: 26.4 CM2
ECHO RA END SYSTOLIC VOLUME APICAL 4 CHAMBER INDEX BSA: 37 ML/M2
ECHO RA VOLUME: 87 ML
ECHO RV BASAL DIMENSION: 4.3 CM
ECHO RV FREE WALL PEAK S': 9.8 CM/S
ECHO RV LONGITUDINAL DIMENSION: 7.4 CM
ECHO RV MID DIMENSION: 3.1 CM
ECHO RV TAPSE: 2 CM (ref 1.7–?)
ECHO TV REGURGITANT MAX VELOCITY: 2.74 M/S
ECHO TV REGURGITANT PEAK GRADIENT: 30 MMHG
ECHO TV REGURGITANT VTI: 81.8 CM
EOSINOPHIL # BLD: 0.2 K/UL (ref 0–0.6)
EOSINOPHIL NFR BLD: 3.6 %
GFR SERPLBLD CREATININE-BSD FMLA CKD-EPI: >90 ML/MIN/{1.73_M2}
GLUCOSE SERPL-MCNC: 102 MG/DL (ref 70–99)
HCT VFR BLD AUTO: 36.5 % (ref 36–48)
HGB BLD-MCNC: 11.9 G/DL (ref 12–16)
LYMPHOCYTES # BLD: 1.9 K/UL (ref 1–5.1)
LYMPHOCYTES NFR BLD: 33.8 %
MAGNESIUM SERPL-MCNC: 2.04 MG/DL (ref 1.8–2.4)
MCH RBC QN AUTO: 24.3 PG (ref 26–34)
MCHC RBC AUTO-ENTMCNC: 32.6 G/DL (ref 31–36)
MCV RBC AUTO: 74.5 FL (ref 80–100)
MONOCYTES # BLD: 0.5 K/UL (ref 0–1.3)
MONOCYTES NFR BLD: 9 %
NEUTROPHILS # BLD: 2.9 K/UL (ref 1.7–7.7)
NEUTROPHILS NFR BLD: 52.7 %
PLATELET # BLD AUTO: 301 K/UL (ref 135–450)
PMV BLD AUTO: 7.6 FL (ref 5–10.5)
POTASSIUM SERPL-SCNC: 3.7 MMOL/L (ref 3.5–5.1)
RBC # BLD AUTO: 4.89 M/UL (ref 4–5.2)
SODIUM SERPL-SCNC: 137 MMOL/L (ref 136–145)
WBC # BLD AUTO: 5.6 K/UL (ref 4–11)

## 2025-04-08 PROCEDURE — 85025 COMPLETE CBC W/AUTO DIFF WBC: CPT

## 2025-04-08 PROCEDURE — G0378 HOSPITAL OBSERVATION PER HR: HCPCS

## 2025-04-08 PROCEDURE — 36415 COLL VENOUS BLD VENIPUNCTURE: CPT

## 2025-04-08 PROCEDURE — 80048 BASIC METABOLIC PNL TOTAL CA: CPT

## 2025-04-08 PROCEDURE — 83735 ASSAY OF MAGNESIUM: CPT

## 2025-04-08 PROCEDURE — 99232 SBSQ HOSP IP/OBS MODERATE 35: CPT | Performed by: NURSE PRACTITIONER

## 2025-04-08 PROCEDURE — 6370000000 HC RX 637 (ALT 250 FOR IP): Performed by: HOSPITALIST

## 2025-04-08 PROCEDURE — 2500000003 HC RX 250 WO HCPCS: Performed by: HOSPITALIST

## 2025-04-08 PROCEDURE — 6370000000 HC RX 637 (ALT 250 FOR IP): Performed by: NURSE PRACTITIONER

## 2025-04-08 RX ORDER — TORSEMIDE 20 MG/1
20 TABLET ORAL EVERY EVENING
Status: DISCONTINUED | OUTPATIENT
Start: 2025-04-08 | End: 2025-04-08 | Stop reason: HOSPADM

## 2025-04-08 RX ORDER — POTASSIUM CHLORIDE 1500 MG/1
20 TABLET, EXTENDED RELEASE ORAL ONCE
Status: COMPLETED | OUTPATIENT
Start: 2025-04-08 | End: 2025-04-08

## 2025-04-08 RX ORDER — METOPROLOL SUCCINATE 50 MG/1
50 TABLET, EXTENDED RELEASE ORAL DAILY
Qty: 90 TABLET | Refills: 1 | Status: SHIPPED
Start: 2025-04-08

## 2025-04-08 RX ORDER — TORSEMIDE 20 MG/1
TABLET ORAL
Qty: 90 TABLET | Refills: 0 | Status: SHIPPED | OUTPATIENT
Start: 2025-04-08 | End: 2025-04-11

## 2025-04-08 RX ORDER — TORSEMIDE 20 MG/1
40 TABLET ORAL DAILY
Status: DISCONTINUED | OUTPATIENT
Start: 2025-04-08 | End: 2025-04-08 | Stop reason: HOSPADM

## 2025-04-08 RX ORDER — DILTIAZEM HYDROCHLORIDE 60 MG/1
60 CAPSULE, EXTENDED RELEASE ORAL 2 TIMES DAILY
Qty: 60 CAPSULE | Refills: 0 | Status: SHIPPED | OUTPATIENT
Start: 2025-04-08

## 2025-04-08 RX ADMIN — TORSEMIDE 40 MG: 20 TABLET ORAL at 09:53

## 2025-04-08 RX ADMIN — DILTIAZEM HYDROCHLORIDE 60 MG: 60 CAPSULE, EXTENDED RELEASE ORAL at 09:52

## 2025-04-08 RX ADMIN — TOPIRAMATE 25 MG: 25 TABLET, FILM COATED ORAL at 08:58

## 2025-04-08 RX ADMIN — SODIUM CHLORIDE, PRESERVATIVE FREE 10 ML: 5 INJECTION INTRAVENOUS at 08:58

## 2025-04-08 RX ADMIN — POTASSIUM CHLORIDE 20 MEQ: 1500 TABLET, EXTENDED RELEASE ORAL at 09:53

## 2025-04-08 RX ADMIN — ASPIRIN 81 MG: 81 TABLET, COATED ORAL at 08:58

## 2025-04-08 RX ADMIN — METOPROLOL SUCCINATE 50 MG: 50 TABLET, EXTENDED RELEASE ORAL at 09:53

## 2025-04-08 ASSESSMENT — PAIN SCALES - GENERAL: PAINLEVEL_OUTOF10: 0

## 2025-04-08 NOTE — PROGRESS NOTES
[facility] HOSPITALISTS PROGRESS NOTE    4/6/2025 12:30 PM        Name: Robyn López .              Admitted: 4/5/2025  Primary Care Provider: Judy Hicks MD (Tel: [phone])      Chief complaint: 38 yo female with hx congenital VSD, PPM, presents with sudden onset dyspnea, palpitations, chest tightness while out shopping. Pacer interrogation in ER showed 20 beat run of V. tach which was near the start of her symptoms. Admitted with NSVT.     Subjective:  Resting in bed, cousin visiting. Patient says she feels good. No further chest tightness, shortness of breath, lightheadedness, dizziness, abdominal pain.    Reviewed interval ancillary notes    Current Medications  aspirin EC tablet 81 mg, Daily  metoprolol succinate (TOPROL XL) extended release tablet 50 mg, Daily  topiramate (TOPAMAX) tablet 25 mg, BID  torsemide (DEMADEX) tablet 20 mg, BID  sodium chloride flush 0.9 % injection 5-40 mL, 2 times per day  sodium chloride flush 0.9 % injection 5-40 mL, PRN  0.9 % sodium chloride infusion, PRN  potassium chloride (KLOR-CON M) extended release tablet 40 mEq, PRN   Or  potassium bicarb-citric acid (EFFER-K) effervescent tablet 40 mEq, PRN   Or  potassium chloride 10 mEq/100 mL IVPB (Peripheral Line), PRN  magnesium sulfate 2000 mg in 50 mL IVPB premix, PRN  enoxaparin Sodium (LOVENOX) injection 30 mg, BID  ondansetron (ZOFRAN-ODT) disintegrating tablet 4 mg, Q8H PRN   Or  ondansetron (ZOFRAN) injection 4 mg, Q6H PRN  polyethylene glycol (GLYCOLAX) packet 17 g, Daily PRN  acetaminophen (TYLENOL) tablet 650 mg, Q6H PRN   Or  acetaminophen (TYLENOL) suppository 650 mg, Q6H PRN  melatonin tablet 3 mg, Nightly PRN  metoprolol tartrate (LOPRESSOR) tablet 25 mg, QPM        Objective:  /64   Pulse 71   Temp 98.2 °F (36.8 °C) (Temporal)   Resp 16   Ht 1.753 m (5' 9\")   Wt 126 kg (277 lb 12.8 oz)   SpO2 98%   BMI 41.02 kg/m² 
    Hospitalist Progress Note      Name:  Robyn López /Age/Sex: 1985  (39 y.o. female)   MRN & CSN:  [id number] & [id number] Encounter Date/Time: 2025 8:03 AM EDT   Location:  [id number] PCP: Judy Hicks MD     Attending:Paul Johnson MD       Hospital Day: 3    Subjective:   Chief Complaint:   Chief Complaint   Patient presents with    Irregular Heart Beat     Pt states that she was in the [employer] store and heart started racing and SOB. Pt has medtronic pace maker       Robyn López is a 39 y.o. female with a past medical history of congenital VSD, PPM, presents with sudden onset dyspnea, palpitations, chest tightness while out shopping. Pacer interrogation in ER showed 20 beat run of V. tach which was near the start of her symptoms. Admitted with NSVT.     Interval History:  Today, she is resting in bed.  No further arrhythmia on telemetry.  She is hesitant to start diltiazem until further discussion with cardiology.      Independently reviewed interval ancillary notes from cardiology.     Assessment and Recommendations   Problem List  Principal Problem:    NSVT (nonsustained ventricular tachycardia) (HCC)  Active Problems:    Pacemaker    Typical atrial flutter (HCC)    Hypokalemia    Paroxysmal A-fib (HCC)    Class 3 obesity    Ventricular tachycardia (HCC)    Hypoglycemia  Resolved Problems:    * No resolved hospital problems. *     Assessment and Plan:    NSVT  - Presented with sudden onset dyspnea, palpitations, chest tightness   - Pacer interrogation in ER revealed 20 beat run NSVT  - Potassium 3.4 on presentation and was replaced, magnesium 2.03  - TSH 1.65  - Troponin negative x 2  - Cardiology consult pending     PAF,   Atrial tachycardia  - Hx ablation  - Presents in sinus rhythm  - Continue metoprolol 50 mg  - Continue telemetry     Hypokalemia  - Potassium 3.4 on presentation and was replaced  - Magnesium 2.03  - Continue to follow    Plan:   Echo pending  Start Cardizem 60 mg 
Data- discharge order received, pt verbalized agreement to discharge, disposition to previous residence, no needs for HHC/DME.     Action- discharge instructions prepared and given to pt, pt verbalized understanding. Medication information packet given r/t NEW and/or CHANGED prescriptions emphasizing name/purpose/side effects, pt verbalized understanding. Discharge instruction summary: Diet- General, Activity- as tolerated, Primary Care Physician as follows: Judy Hicks [phone] f/u appointment in 1 week, prescription medications filled at [facility]. CHF Education reviewed. Pt/ Family has had a total of 60 minutes CHF education this admission encounter.     1. WEIGHT: Admit Weight - Scale: 124.7 kg (275 lb) (04/05/25 1605)        Today  Weight - Scale: 124.6 kg (274 lb 11.1 oz) (04/08/25 0433)       2. O2 SAT.: SpO2: 98 % (04/08/25 1211)    Response- Pt belongings gathered, IV removed. Disposition is home (no HHC/DME needs), transported with mother, taken to lobby via w/c w/ RN, no complications.    
[facility]   EP Progress Note     Date: 4/7/2025  Admit Date: 4/5/2025     Reason for consultation: Atrial flutter    Chief Complaint:   Chief Complaint   Patient presents with    Irregular Heart Beat     Pt states that she was in the [employer] store and heart started racing and SOB. Pt has medtronic pace maker         History of Present Illness: History obtained from patient and medical record.     Robyn López is a 39 y.o. female with a past medical history of complex congenital heart disease s/p repair in 1985 at [facility], AT/a flutter s/p ablation (2022), HFpEF, sickle cell trait, asthma PVCs and NSVT. S/p Medtronic PPM (2019)     She was walking in a shopping mall yesterday in [place] when suddenly started having palpitation.  She noted her heart was racing, did not feel well,  had some rest and then palpitation lasted for about 15 minutes.  This was early afternoon around 2 PM according to the patient.  She then came to the emergency room and has been admitted to the hospital.  There is report of NSVT according to the ER pacemaker check however records is not available.     Interval Hx: Today, she is being seen for follow up. She is doing well. She remains hemodynamically stable in sinus rhythm. No further palpitations or arrhythmias.     Patient seen and examined. Clinical notes reviewed. Telemetry reviewed.  No new complaints today. No major events overnight.   Denies having chest pain, palpitations, shortness of breath, orthopnea/PND, cough, or dizziness at the time of this visit.    Assessment and Plan:     1.Palpitation, PAT   - S/p ablation (2022)    - Stable   - Reviewed the EGM from her episode on Saturday. She was in SVT with rate >200 BPM.  No further episodes since Saturday. She is on metoprolol. Will add cardizem 60 mg BID and see if she can tolerate it. If not, then we can consider EP study and ablation as outpatient    2. High Grade AV Block  - S/p Dual chamber Medtronic PPM (2019)  - I 
Transferred scripts to [facility] per patient request  
atorvastatin, diltiazem, omeprazole, or spironolactone (cancelled yesterday by Dr. Sr).    Of note, patient took all AM meds prior to ED arrival.    Timing of last doses updated.    Thank you,  Jennifer Silveira CPhT      
Cold intolerance 06/28/2024    Gastroesophageal reflux disease without esophagitis 06/28/2024    PVC (premature ventricular contraction) 06/10/2024    Hypokalemia 06/10/2024    Iron deficiency anemia, unspecified 09/18/2023    Congenital heart disease 07/25/2023    Nonrheumatic tricuspid valve regurgitation 07/25/2023    Prediabetes 07/25/2023    Obesities, morbid 07/25/2023    Typical atrial flutter (HCC)     Atrial tachycardia     Status post placement of implantable loop recorder     NSVT (nonsustained ventricular tachycardia) (HCC)     Chest pain 08/28/2019    Palpitations     Dyspnea and respiratory abnormalities     Chronic diastolic heart failure (HCC)     Pacemaker     CHB (complete heart block) (HCC) 07/31/2019    PAF (paroxysmal atrial fibrillation) (HCC)         Multiple medical conditions with risk of decompensation.     All pertinent information and plan of care discussed with the EP physician and Dr. Sr (primary cardiologist). Case discussed with hospitalist provider    All questions and concerns were addressed to the patient. Alternatives to my treatment were discussed. I have discussed the above stated plan with patient and the nurse. The patient verbalized understanding and agreed with the plan.    Thank you for allowing to us to participate in the care of [facility]   Office: [phone]

## 2025-04-08 NOTE — DISCHARGE SUMMARY
moderate regurgitation. No stenosis noted.    Pulmonic Valve: The pulmonic valve visualization is suboptimal but appears to be functioning normally. Trace to mild regurgitation. No stenosis noted.    Aorta: Normal sized aortic root. Ascending Aorta not visualized.    Pericardium: No pericardial effusion.    IVC/SVC: IVC diameter is less than or equal to 21 mm and decreases greater than 50% during inspiration; therefore the estimated right atrial pressure is normal (~3 mmHg).    Image quality is technically difficult. Contrast used: Lumason. Technically difficult study due to patient's body habitus.    Overall, no significant change compared to prior study done 6/2024.  Invasive procedures and treatments.   None     Consults.  IP CONSULT TO CARDIOLOGY    Physical examination on discharge day.   /61   Pulse 62   Temp 98.7 °F (37.1 °C) (Temporal)   Resp 16   Ht 1.753 m (5' 9\")   Wt 124.6 kg (274 lb 11.1 oz)   SpO2 98%   BMI 40.57 kg/m²   General appearance.  Alert. Looks comfortable. Well nourished.  +obesity   HEENT. Sclera clear. Moist mucus membranes.   Cardiovascular. Regular rate and rhythm, normal S1, S2. No murmur. No significant peripheral edema  Respiratory. Breathing unlabored, not using accessory muscles. Clear to auscultation bilaterally, no wheeze, crackles or rhonchi.  Gastrointestinal. Abdomen soft, non-tender, not distended, normal bowel sounds.  Neurology. Facial symmetry. No speech deficits. Moving all extremities equally.  Extremities. No focal weakness. Pulses palpable.   Skin. Warm, dry, normal turgor    Condition at time of discharge: Good    Medication instructions provided to patient at discharge.     Medication List        START taking these medications      dilTIAZem 60 MG extended release capsule  Commonly known as: CARDIZEM 12 HR  Take 1 capsule by mouth 2 times daily  Notes to patient: Use: is an antihypertensive medication and a calcium channel blocker used to treat high blood

## 2025-04-08 NOTE — PLAN OF CARE
Problem: Chronic Conditions and Co-morbidities  Goal: Patient's chronic conditions and co-morbidity symptoms are monitored and maintained or improved  4/8/2025 1413 by Nuris Bautista RN  Outcome: Completed  4/8/2025 1031 by Nuris Bautista RN  Outcome: Progressing     Problem: Discharge Planning  Goal: Discharge to home or other facility with appropriate resources  4/8/2025 1413 by Nuris Bautista RN  Outcome: Completed  4/8/2025 1031 by Nuris Bautista RN  Outcome: Progressing     Problem: Pain  Goal: Verbalizes/displays adequate comfort level or baseline comfort level  4/8/2025 1413 by Nuris Bautista RN  Outcome: Completed  4/8/2025 1031 by Nuris Bautista RN  Outcome: Progressing     Problem: Safety - Adult  Goal: Free from fall injury  4/8/2025 1413 by Nuris Bautista RN  Outcome: Completed  4/8/2025 1031 by Nuris Bautista RN  Outcome: Progressing     Problem: Cardiovascular - Adult  Goal: Maintains optimal cardiac output and hemodynamic stability  4/8/2025 1413 by Nuris Bautista RN  Outcome: Completed  4/8/2025 1031 by Nuris Bautista RN  Outcome: Progressing  Goal: Absence of cardiac dysrhythmias or at baseline  4/8/2025 1413 by Nuris Bautista RN  Outcome: Completed  4/8/2025 1031 by Nuris Bautista RN  Outcome: Progressing

## 2025-04-08 NOTE — PLAN OF CARE
Problem: Chronic Conditions and Co-morbidities  Goal: Patient's chronic conditions and co-morbidity symptoms are monitored and maintained or improved  Outcome: Progressing     Problem: Discharge Planning  Goal: Discharge to home or other facility with appropriate resources  Outcome: Progressing     Problem: Pain  Goal: Verbalizes/displays adequate comfort level or baseline comfort level  Outcome: Progressing     Problem: Safety - Adult  Goal: Free from fall injury  Outcome: Progressing     Problem: Cardiovascular - Adult  Goal: Maintains optimal cardiac output and hemodynamic stability  Outcome: Progressing  Goal: Absence of cardiac dysrhythmias or at baseline  Outcome: Progressing

## 2025-04-09 ENCOUNTER — TELEPHONE (OUTPATIENT)
Dept: CARDIOLOGY CLINIC | Age: 40
End: 2025-04-09

## 2025-04-11 RX ORDER — TORSEMIDE 20 MG/1
TABLET ORAL
Qty: 270 TABLET | Refills: 0 | Status: SHIPPED | OUTPATIENT
Start: 2025-04-11

## 2025-04-11 NOTE — TELEPHONE ENCOUNTER
Torsemide 20 mg 2 tabs in am, 1 tab in pm    Last OV: 03/19/2025  Last labs/ EKG: BMP 04/08/2025  Appt scheduled : on 2026 recall

## 2025-06-10 ENCOUNTER — TELEPHONE (OUTPATIENT)
Dept: CARDIOLOGY CLINIC | Age: 40
End: 2025-06-10

## 2025-06-10 PROBLEM — R07.9 CHEST PAIN: Status: RESOLVED | Noted: 2019-08-28 | Resolved: 2025-06-10

## 2025-06-10 PROBLEM — E16.2 HYPOGLYCEMIA: Status: RESOLVED | Noted: 2025-04-05 | Resolved: 2025-06-10

## 2025-06-10 PROBLEM — E87.6 HYPOKALEMIA: Status: RESOLVED | Noted: 2024-06-10 | Resolved: 2025-06-10

## 2025-06-10 PROBLEM — I48.0 PAROXYSMAL A-FIB (HCC): Status: RESOLVED | Noted: 2024-10-21 | Resolved: 2025-06-10

## 2025-06-10 PROBLEM — I49.9 CARDIAC ARRHYTHMIA: Status: RESOLVED | Noted: 2024-10-21 | Resolved: 2025-06-10

## 2025-06-10 PROBLEM — E66.813 CLASS 3 OBESITY (HCC): Status: RESOLVED | Noted: 2025-04-05 | Resolved: 2025-06-10

## 2025-06-10 NOTE — TELEPHONE ENCOUNTER
Ananya,    Patient was originally scheduled w/[facility] Wed in [place], but can't make it.  I have rescheduled her, but not  until 8/4.  Is there anywhere she can be seen sooner?  She is willing to go to any office, but needs afternoon.  If not, she will keep appt.  Please advise.  Thank you.

## 2025-06-16 NOTE — TELEPHONE ENCOUNTER
Ananya,  Haven't heard back from you yet.  Is Aug okay for this patient, or can you give me earlier date?  Please advise.  Thanks.

## 2025-06-25 ENCOUNTER — TELEPHONE (OUTPATIENT)
Dept: CARDIOLOGY CLINIC | Age: 40
End: 2025-06-25

## 2025-06-25 NOTE — TELEPHONE ENCOUNTER
Pt called back stating they sent a transmission, they are at working and it is processing.    Pls advise

## 2025-06-25 NOTE — TELEPHONE ENCOUNTER
Patient reports that she has been having some chest pain. She states it started while she was laying down, but now happens other times. She states she is not SOB, but will be seeing her PCP Judy Hicks tomorrow. She wanted to let [facility] know what is going on. Please advise. Thank you

## 2025-06-25 NOTE — TELEPHONE ENCOUNTER
LMOM for Pt to c/b with recent vitals, better understanding of symptoms and onset and to send in remote transmission from device. If Pt c/b please request her to do these things.

## 2025-07-02 PROCEDURE — 93294 REM INTERROG EVL PM/LDLS PM: CPT | Performed by: INTERNAL MEDICINE

## 2025-07-02 PROCEDURE — 93296 REM INTERROG EVL PM/IDS: CPT | Performed by: INTERNAL MEDICINE

## 2025-07-08 ENCOUNTER — HOSPITAL ENCOUNTER (EMERGENCY)
Age: 40
Discharge: HOME | End: 2025-07-08
Attending: EMERGENCY MEDICINE
Payer: COMMERCIAL

## 2025-07-08 ENCOUNTER — APPOINTMENT (OUTPATIENT)
Dept: CT IMAGING | Age: 40
End: 2025-07-08
Payer: COMMERCIAL

## 2025-07-08 ENCOUNTER — APPOINTMENT (OUTPATIENT)
Dept: GENERAL RADIOLOGY | Age: 40
End: 2025-07-08
Payer: COMMERCIAL

## 2025-07-08 VITALS
OXYGEN SATURATION: 100 % | BODY MASS INDEX: 40.61 KG/M2 | HEART RATE: 71 BPM | TEMPERATURE: 98 F | WEIGHT: 275 LBS | SYSTOLIC BLOOD PRESSURE: 126 MMHG | DIASTOLIC BLOOD PRESSURE: 82 MMHG | RESPIRATION RATE: 18 BRPM

## 2025-07-08 DIAGNOSIS — R06.02 SHORTNESS OF BREATH: ICD-10-CM

## 2025-07-08 DIAGNOSIS — R00.2 PALPITATIONS: Primary | ICD-10-CM

## 2025-07-08 DIAGNOSIS — K80.20 GALLSTONES: ICD-10-CM

## 2025-07-08 DIAGNOSIS — R07.89 CHEST DISCOMFORT: ICD-10-CM

## 2025-07-08 DIAGNOSIS — R10.9 ABDOMINAL PAIN, UNSPECIFIED ABDOMINAL LOCATION: ICD-10-CM

## 2025-07-08 LAB
ALBUMIN SERPL-MCNC: 3.9 G/DL (ref 3.4–5)
ALBUMIN/GLOB SERPL: 1.3 {RATIO} (ref 1.1–2.2)
ALP SERPL-CCNC: 46 U/L (ref 40–129)
ALT SERPL-CCNC: 15 U/L (ref 10–40)
ANION GAP SERPL CALCULATED.3IONS-SCNC: 11 MMOL/L (ref 3–16)
APTT BLD: 26.1 SEC (ref 22.8–35.8)
AST SERPL-CCNC: 20 U/L (ref 15–37)
BASOPHILS # BLD: 0.1 K/UL (ref 0–0.2)
BASOPHILS NFR BLD: 1 %
BILIRUB SERPL-MCNC: 0.5 MG/DL (ref 0–1)
BUN SERPL-MCNC: 16 MG/DL (ref 7–20)
CALCIUM SERPL-MCNC: 8.9 MG/DL (ref 8.3–10.6)
CHLORIDE SERPL-SCNC: 106 MMOL/L (ref 99–110)
CO2 SERPL-SCNC: 25 MMOL/L (ref 21–32)
CREAT SERPL-MCNC: 0.8 MG/DL (ref 0.6–1.1)
DEPRECATED RDW RBC AUTO: 14.4 % (ref 12.4–15.4)
EKG ATRIAL RATE: 78 BPM
EKG DIAGNOSIS: NORMAL
EKG P AXIS: 50 DEGREES
EKG P-R INTERVAL: 128 MS
EKG Q-T INTERVAL: 422 MS
EKG QRS DURATION: 144 MS
EKG QTC CALCULATION (BAZETT): 481 MS
EKG R AXIS: -7 DEGREES
EKG T AXIS: 34 DEGREES
EKG VENTRICULAR RATE: 78 BPM
EOSINOPHIL # BLD: 0.2 K/UL (ref 0–0.6)
EOSINOPHIL NFR BLD: 3.1 %
GFR SERPLBLD CREATININE-BSD FMLA CKD-EPI: >90 ML/MIN/{1.73_M2}
GLUCOSE SERPL-MCNC: 102 MG/DL (ref 70–99)
HCG SERPL QL: NEGATIVE
HCT VFR BLD AUTO: 35.3 % (ref 36–48)
HGB BLD-MCNC: 11.9 G/DL (ref 12–16)
INR PPP: 0.97 (ref 0.86–1.14)
LIPASE SERPL-CCNC: 25 U/L (ref 13–60)
LYMPHOCYTES # BLD: 1.5 K/UL (ref 1–5.1)
LYMPHOCYTES NFR BLD: 26.1 %
MAGNESIUM SERPL-MCNC: 2.08 MG/DL (ref 1.8–2.4)
MCH RBC QN AUTO: 24.7 PG (ref 26–34)
MCHC RBC AUTO-ENTMCNC: 33.7 G/DL (ref 31–36)
MCV RBC AUTO: 73.3 FL (ref 80–100)
MONOCYTES # BLD: 0.6 K/UL (ref 0–1.3)
MONOCYTES NFR BLD: 9.8 %
NEUTROPHILS # BLD: 3.5 K/UL (ref 1.7–7.7)
NEUTROPHILS NFR BLD: 60 %
NT-PROBNP SERPL-MCNC: 151 PG/ML (ref 0–124)
PLATELET # BLD AUTO: 301 K/UL (ref 135–450)
PMV BLD AUTO: 7.7 FL (ref 5–10.5)
POTASSIUM SERPL-SCNC: 4.1 MMOL/L (ref 3.5–5.1)
PROT SERPL-MCNC: 7 G/DL (ref 6.4–8.2)
PROTHROMBIN TIME: 13.2 SEC (ref 12.1–14.9)
RBC # BLD AUTO: 4.82 M/UL (ref 4–5.2)
SODIUM SERPL-SCNC: 142 MMOL/L (ref 136–145)
TROPONIN, HIGH SENSITIVITY: <6 NG/L (ref 0–14)
TROPONIN, HIGH SENSITIVITY: <6 NG/L (ref 0–14)
TSH SERPL DL<=0.005 MIU/L-ACNC: 2.79 UIU/ML (ref 0.27–4.2)
WBC # BLD AUTO: 5.8 K/UL (ref 4–11)

## 2025-07-08 PROCEDURE — 71045 X-RAY EXAM CHEST 1 VIEW: CPT

## 2025-07-08 PROCEDURE — 83880 ASSAY OF NATRIURETIC PEPTIDE: CPT

## 2025-07-08 PROCEDURE — 84703 CHORIONIC GONADOTROPIN ASSAY: CPT

## 2025-07-08 PROCEDURE — 83690 ASSAY OF LIPASE: CPT

## 2025-07-08 PROCEDURE — 93010 ELECTROCARDIOGRAM REPORT: CPT | Performed by: INTERNAL MEDICINE

## 2025-07-08 PROCEDURE — 93005 ELECTROCARDIOGRAM TRACING: CPT | Performed by: EMERGENCY MEDICINE

## 2025-07-08 PROCEDURE — 85730 THROMBOPLASTIN TIME PARTIAL: CPT

## 2025-07-08 PROCEDURE — 84443 ASSAY THYROID STIM HORMONE: CPT

## 2025-07-08 PROCEDURE — 83735 ASSAY OF MAGNESIUM: CPT

## 2025-07-08 PROCEDURE — 85610 PROTHROMBIN TIME: CPT

## 2025-07-08 PROCEDURE — 80053 COMPREHEN METABOLIC PANEL: CPT

## 2025-07-08 PROCEDURE — 85025 COMPLETE CBC W/AUTO DIFF WBC: CPT

## 2025-07-08 PROCEDURE — 99222 1ST HOSP IP/OBS MODERATE 55: CPT | Performed by: NURSE PRACTITIONER

## 2025-07-08 PROCEDURE — 6360000004 HC RX CONTRAST MEDICATION: Performed by: PHYSICIAN ASSISTANT

## 2025-07-08 PROCEDURE — 99285 EMERGENCY DEPT VISIT HI MDM: CPT

## 2025-07-08 PROCEDURE — 84484 ASSAY OF TROPONIN QUANT: CPT

## 2025-07-08 PROCEDURE — 74177 CT ABD & PELVIS W/CONTRAST: CPT

## 2025-07-08 RX ADMIN — IOHEXOL 75 ML: 350 INJECTION, SOLUTION INTRAVENOUS at 10:20

## 2025-07-08 ASSESSMENT — ENCOUNTER SYMPTOMS
NAUSEA: 1
VOMITING: 0
WHEEZING: 0
STRIDOR: 0
COUGH: 0
ABDOMINAL PAIN: 1
DIARRHEA: 0
CONSTIPATION: 1
SHORTNESS OF BREATH: 1

## 2025-07-08 ASSESSMENT — PAIN SCALES - GENERAL
PAINLEVEL_OUTOF10: 5
PAINLEVEL_OUTOF10: 0

## 2025-07-08 ASSESSMENT — PAIN - FUNCTIONAL ASSESSMENT: PAIN_FUNCTIONAL_ASSESSMENT: 0-10

## 2025-07-08 NOTE — ED PROVIDER NOTES
[facility] Emergency Department      Pt Name: Robyn López  MRN: [id number]  Birthdate 1985  Date of evaluation: 7/8/2025  Provider: TREVON FORTE MD  I personally saw Robyn López and made and approved the management plan with the advanced practice provider.  I take responsibility for the patient management.     HPI: Robyn López presented with   Chief Complaint   Patient presents with    Palpitations     Constant chest pains, palpitations, SOB & nausea, since this morning.  Card hx; sees [facility].       Robyn López has a past medical history of A-fib (HCC), Abnormal ECG, Asthma, Atrial fibrillation (HCC), CHF (congestive heart failure) ([facility]), Congenital heart defect, Migraines, basilar, Palpitations, Sciatica, and VSD (ventricular septal defect and aortic arch hypoplasia.  She has a past surgical history that includes VSD repair; Cardiac surgery; pacemaker placement (07/31/2019); Tonsillectomy; Cardiac electrophysiology study and ablation; Cardioversion (N/A); Diagnostic Cardiac Cath Lab Procedure; ablation of dysrhythmic focus (2022); and Cardiac pacemaker placement (2019).    No current facility-administered medications on file prior to encounter.     Current Outpatient Medications on File Prior to Encounter   Medication Sig Dispense Refill    torsemide (DEMADEX) 20 MG tablet TAKE TWO TABLETS BY MOUTH IN THE MORNING AND ONE TABLET IN THE EVENING 270 tablet 0    metoprolol succinate (TOPROL XL) 50 MG extended release tablet Take 1 tablet by mouth daily Take 1 tablet in the morning and 0.5 tablet at night 90 tablet 1    dilTIAZem (CARDIZEM 12 HR) 60 MG extended release capsule Take 1 capsule by mouth 2 times daily 60 capsule 0    ibuprofen (ADVIL;MOTRIN) 200 MG tablet Take 2 tablets by mouth every 6 hours as needed for Pain      atorvastatin (LIPITOR) 80 MG tablet Take 1 tablet by mouth nightly (Patient not taking: Reported on 4/5/2025) 30 tablet 3    omeprazole (PRILOSEC) 20 MG delayed 
7/2/25 pcp visit    CC/HPI Summary, DDx, ED Course, and Reassessment: This patient presents to the emergency department with complaints of intermittent shortness of breath, palpitations, chest discomfort, abdominal pain.  Initial and repeat troponin are negative.  EKG appears stable.  Her [employer] device was interrogated and cardiologist did come down to speak with patient about this evaluation.  They feel comfortable with patient being discharged home.  They do suspect some element of anxiety.  Patient also reports intermittent abdominal discomfort and CT scan does reveal evidence of gallstones.  She states that she is not having any urinary symptoms and just provided her PCP with a urine sample several days ago which was unremarkable.  She is advised to follow-up with general surgeon, PCP and her cardiologist.  She was given return precautions.    Disposition Considerations (tests considered but not done, Admit vs D/C, Shared Decision Making, Pt Expectation of Test or Tx.): My suspicion is low for ACS, PE, myocarditis, pericarditis, endocarditis, acute pulmonary edema, pleural effusion, pericardial effusion, cardiac tamponade, CHF exacerbation, thoracic aortic dissection, esophageal rupture, other life-threatening arrhythmia, hemothorax, pulmonary contusion, subcutaneous emphysema, flail chest, pneumo mediastinum, rib fracture, pneumonia, pneumothorax, ARDS, carotid dissection, sinus abscess, acute fracture, acute CVA, ICH, SAH, TIA, meningitis, encephalitis, pseudotumor cerebri, temporal arteritis, sentinel bleed from ruptured aneurysm, hypertensive urgency or emergency, subdural hematoma, epidural hematoma, cerebellar compromise, posterior stroke,  sepsis, DKA, acute surgical abdomen, obstruction, perforation, abscess, mesenteric ischemia, AAA, dissection, cholecystitis, cholangitis, pancreatitis, appendicitis, C. diff colitis, diverticulitis, volvulus, incarcerated hernia, necrotizing fasciitis, TOA, ovarian

## 2025-07-08 NOTE — CONSULTS
than or equal to 21 mm and decreases greater than 50% during inspiration; therefore the estimated right atrial pressure is normal (~3 mmHg).     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    Left Ventricle: Normal left ventricular systolic function with a visually estimated EF of 50 - 55%. Not well visualized. Left ventricle size is normal. Normal wall thickness. Normal wall motion. Normal diastolic function.    Right Ventricle: Normal systolic function. TAPSE is 2.1 cm. RV Peak S' is 10 cm/s.    Aortic Valve: Mild narrowing of the LVOT secondary to interventricular tunnel repair.  Peak velocity 2.5 m/s. Aortic valve opens normally.    Pulmonic Valve: Trace regurgitation. Mildly increased gradient but not stenosis.    Tricuspid Valve: Moderate regurgitation. Normal RVSP. Est RA pressure is 3 mmHg. The estimated RVSP is 24 mmHg.     Stress Test: 11/24    Stress Combined Conclusion: Normal pharmacological myocardial perfusion study.    Perfusion Comments: LV perfusion is normal.    Perfusion Conclusion: There is no evidence of transient ischemic dilation (TID). TID ratio is 1.10.    Image quality is good.    ECG: Resting ECG demonstrates right bundle branch block.    ECG: The stress ECG was negative for ischemia.    Problem List:   Patient Active Problem List    Diagnosis Date Noted    Ventricular tachycardia (HCC) 04/05/2025    Class 2 severe obesity due to excess calories with serious comorbidity and body mass index (BMI) of 39.0 to 39.9 in adult (HCC) 03/05/2025    DORV (double outlet right ventricle) 03/05/2025    Low mean corpuscular volume (MCV) 06/28/2024    Cold intolerance 06/28/2024    Gastroesophageal reflux disease without esophagitis 06/28/2024    PVC (premature ventricular contraction) 06/10/2024    Iron deficiency anemia, unspecified 09/18/2023    Congenital heart disease 07/25/2023    Nonrheumatic

## 2025-07-18 ENCOUNTER — TELEPHONE (OUTPATIENT)
Dept: CARDIOLOGY CLINIC | Age: 40
End: 2025-07-18

## 2025-07-18 NOTE — TELEPHONE ENCOUNTER
Patient is scheduled for an appointment with Dr. Martínez on 7/21/25 and called pt to confirm and patient said that her leg is no longer swollen like it was around April.  Patient would like to know if she still needs this appointment.  Please advise.  Thanks.

## 2025-07-21 NOTE — TELEPHONE ENCOUNTER
Pt would like to cancel today's appt as she is not having the symptoms she had before.  Pt asking if she needs to be seen right now. Please advise pt.

## 2025-07-23 ENCOUNTER — TELEPHONE (OUTPATIENT)
Dept: CARDIOLOGY CLINIC | Age: 40
End: 2025-07-23

## 2025-07-29 NOTE — TELEPHONE ENCOUNTER
Pt called an informed of message below, she verbalized understanding.    Diony Martínez MD  You2 days ago       No sounds like her issues are resolved  If swelling comes back let us know and we can reschedule    Otherwise does not need future appointments

## 2025-08-13 ENCOUNTER — APPOINTMENT (OUTPATIENT)
Dept: CT IMAGING | Age: 40
End: 2025-08-13
Payer: COMMERCIAL

## 2025-08-13 ENCOUNTER — TELEPHONE (OUTPATIENT)
Dept: CARDIOLOGY CLINIC | Age: 40
End: 2025-08-13

## 2025-08-13 ENCOUNTER — HOSPITAL ENCOUNTER (EMERGENCY)
Age: 40
Discharge: HOME | End: 2025-08-13
Attending: STUDENT IN AN ORGANIZED HEALTH CARE EDUCATION/TRAINING PROGRAM
Payer: COMMERCIAL

## 2025-08-13 VITALS
RESPIRATION RATE: 17 BRPM | SYSTOLIC BLOOD PRESSURE: 115 MMHG | DIASTOLIC BLOOD PRESSURE: 72 MMHG | BODY MASS INDEX: 40.73 KG/M2 | OXYGEN SATURATION: 96 % | HEIGHT: 69 IN | WEIGHT: 275 LBS | HEART RATE: 64 BPM | TEMPERATURE: 98.2 F

## 2025-08-13 DIAGNOSIS — R00.2 PALPITATIONS: ICD-10-CM

## 2025-08-13 DIAGNOSIS — R07.9 CHEST PAIN, UNSPECIFIED TYPE: Primary | ICD-10-CM

## 2025-08-13 LAB
ALBUMIN SERPL-MCNC: 4.3 G/DL (ref 3.4–5)
ALBUMIN/GLOB SERPL: 1.1 {RATIO} (ref 1.1–2.2)
ALP SERPL-CCNC: 60 U/L (ref 40–129)
ALT SERPL-CCNC: 20 U/L (ref 10–40)
ANION GAP SERPL CALCULATED.3IONS-SCNC: 15 MMOL/L (ref 3–16)
APTT BLD: 25.4 SEC (ref 22.8–35.8)
AST SERPL-CCNC: 20 U/L (ref 15–37)
BASOPHILS # BLD: 0 K/UL (ref 0–0.2)
BASOPHILS NFR BLD: 0.8 %
BILIRUB SERPL-MCNC: 0.7 MG/DL (ref 0–1)
BUN SERPL-MCNC: 13 MG/DL (ref 7–20)
CALCIUM SERPL-MCNC: 9.8 MG/DL (ref 8.3–10.6)
CHLORIDE SERPL-SCNC: 102 MMOL/L (ref 99–110)
CO2 SERPL-SCNC: 23 MMOL/L (ref 21–32)
CREAT SERPL-MCNC: 0.8 MG/DL (ref 0.6–1.1)
DEPRECATED RDW RBC AUTO: 14.3 % (ref 12.4–15.4)
EOSINOPHIL # BLD: 0.1 K/UL (ref 0–0.6)
EOSINOPHIL NFR BLD: 1.6 %
GFR SERPLBLD CREATININE-BSD FMLA CKD-EPI: >90 ML/MIN/{1.73_M2}
GLUCOSE SERPL-MCNC: 89 MG/DL (ref 70–99)
HCG SERPL QL: NEGATIVE
HCT VFR BLD AUTO: 39.4 % (ref 36–48)
HGB BLD-MCNC: 13 G/DL (ref 12–16)
INR PPP: 0.99 (ref 0.86–1.14)
LYMPHOCYTES # BLD: 1.3 K/UL (ref 1–5.1)
LYMPHOCYTES NFR BLD: 24.2 %
MAGNESIUM SERPL-MCNC: 2.07 MG/DL (ref 1.8–2.4)
MCH RBC QN AUTO: 24.5 PG (ref 26–34)
MCHC RBC AUTO-ENTMCNC: 33 G/DL (ref 31–36)
MCV RBC AUTO: 74.2 FL (ref 80–100)
MONOCYTES # BLD: 0.4 K/UL (ref 0–1.3)
MONOCYTES NFR BLD: 8.1 %
NEUTROPHILS # BLD: 3.5 K/UL (ref 1.7–7.7)
NEUTROPHILS NFR BLD: 65.3 %
NT-PROBNP SERPL-MCNC: 52 PG/ML (ref 0–124)
PLATELET # BLD AUTO: 328 K/UL (ref 135–450)
PMV BLD AUTO: 7.8 FL (ref 5–10.5)
POTASSIUM SERPL-SCNC: 3.2 MMOL/L (ref 3.5–5.1)
PROT SERPL-MCNC: 8.2 G/DL (ref 6.4–8.2)
PROTHROMBIN TIME: 13.4 SEC (ref 12.1–14.9)
RBC # BLD AUTO: 5.31 M/UL (ref 4–5.2)
SODIUM SERPL-SCNC: 140 MMOL/L (ref 136–145)
TROPONIN, HIGH SENSITIVITY: <6 NG/L (ref 0–14)
TROPONIN, HIGH SENSITIVITY: <6 NG/L (ref 0–14)
WBC # BLD AUTO: 5.4 K/UL (ref 4–11)

## 2025-08-13 PROCEDURE — 84484 ASSAY OF TROPONIN QUANT: CPT

## 2025-08-13 PROCEDURE — 99285 EMERGENCY DEPT VISIT HI MDM: CPT

## 2025-08-13 PROCEDURE — 70450 CT HEAD/BRAIN W/O DYE: CPT

## 2025-08-13 PROCEDURE — 85730 THROMBOPLASTIN TIME PARTIAL: CPT

## 2025-08-13 PROCEDURE — 6360000004 HC RX CONTRAST MEDICATION: Performed by: PHYSICIAN ASSISTANT

## 2025-08-13 PROCEDURE — 72125 CT NECK SPINE W/O DYE: CPT

## 2025-08-13 PROCEDURE — 93005 ELECTROCARDIOGRAM TRACING: CPT | Performed by: STUDENT IN AN ORGANIZED HEALTH CARE EDUCATION/TRAINING PROGRAM

## 2025-08-13 PROCEDURE — 83735 ASSAY OF MAGNESIUM: CPT

## 2025-08-13 PROCEDURE — 70496 CT ANGIOGRAPHY HEAD: CPT

## 2025-08-13 PROCEDURE — 71275 CT ANGIOGRAPHY CHEST: CPT

## 2025-08-13 PROCEDURE — 83880 ASSAY OF NATRIURETIC PEPTIDE: CPT

## 2025-08-13 PROCEDURE — 80053 COMPREHEN METABOLIC PANEL: CPT

## 2025-08-13 PROCEDURE — 85610 PROTHROMBIN TIME: CPT

## 2025-08-13 PROCEDURE — 6370000000 HC RX 637 (ALT 250 FOR IP): Performed by: STUDENT IN AN ORGANIZED HEALTH CARE EDUCATION/TRAINING PROGRAM

## 2025-08-13 PROCEDURE — 85025 COMPLETE CBC W/AUTO DIFF WBC: CPT

## 2025-08-13 PROCEDURE — 84703 CHORIONIC GONADOTROPIN ASSAY: CPT

## 2025-08-13 RX ORDER — IOPAMIDOL 755 MG/ML
150 INJECTION, SOLUTION INTRAVASCULAR
Status: COMPLETED | OUTPATIENT
Start: 2025-08-13 | End: 2025-08-13

## 2025-08-13 RX ORDER — FLUTICASONE PROPIONATE 50 MCG
1 SPRAY, SUSPENSION (ML) NASAL DAILY PRN
COMMUNITY

## 2025-08-13 RX ORDER — POTASSIUM CHLORIDE 1500 MG/1
40 TABLET, EXTENDED RELEASE ORAL ONCE
Status: COMPLETED | OUTPATIENT
Start: 2025-08-13 | End: 2025-08-13

## 2025-08-13 RX ADMIN — POTASSIUM CHLORIDE 40 MEQ: 1500 TABLET, EXTENDED RELEASE ORAL at 22:15

## 2025-08-13 RX ADMIN — IOPAMIDOL 150 ML: 755 INJECTION, SOLUTION INTRAVENOUS at 18:24

## 2025-08-13 ASSESSMENT — ENCOUNTER SYMPTOMS
PHOTOPHOBIA: 0
DIARRHEA: 0
COLOR CHANGE: 0
SHORTNESS OF BREATH: 1
CHEST TIGHTNESS: 1
ABDOMINAL DISTENTION: 0
CONSTIPATION: 0
COUGH: 0
VOMITING: 0
BACK PAIN: 1
NAUSEA: 0
ABDOMINAL PAIN: 0

## 2025-08-13 ASSESSMENT — PAIN DESCRIPTION - DESCRIPTORS: DESCRIPTORS: SHOOTING

## 2025-08-13 ASSESSMENT — PAIN DESCRIPTION - LOCATION: LOCATION: CHEST

## 2025-08-13 ASSESSMENT — PAIN DESCRIPTION - ORIENTATION: ORIENTATION: LEFT

## 2025-08-13 ASSESSMENT — PAIN - FUNCTIONAL ASSESSMENT: PAIN_FUNCTIONAL_ASSESSMENT: 0-10

## 2025-08-13 ASSESSMENT — PAIN SCALES - GENERAL: PAINLEVEL_OUTOF10: 8

## 2025-08-14 LAB
EKG ATRIAL RATE: 78 BPM
EKG DIAGNOSIS: NORMAL
EKG P AXIS: 27 DEGREES
EKG P-R INTERVAL: 128 MS
EKG Q-T INTERVAL: 430 MS
EKG QRS DURATION: 148 MS
EKG QTC CALCULATION (BAZETT): 490 MS
EKG R AXIS: -12 DEGREES
EKG T AXIS: 1 DEGREES
EKG VENTRICULAR RATE: 78 BPM

## 2025-08-14 PROCEDURE — 93010 ELECTROCARDIOGRAM REPORT: CPT | Performed by: INTERNAL MEDICINE
